# Patient Record
Sex: FEMALE | Race: WHITE | NOT HISPANIC OR LATINO
[De-identification: names, ages, dates, MRNs, and addresses within clinical notes are randomized per-mention and may not be internally consistent; named-entity substitution may affect disease eponyms.]

---

## 2017-06-16 ENCOUNTER — TRANSCRIPTION ENCOUNTER (OUTPATIENT)
Age: 72
End: 2017-06-16

## 2019-02-27 ENCOUNTER — RECORD ABSTRACTING (OUTPATIENT)
Age: 74
End: 2019-02-27

## 2019-02-27 DIAGNOSIS — Z91.89 OTHER SPECIFIED PERSONAL RISK FACTORS, NOT ELSEWHERE CLASSIFIED: ICD-10-CM

## 2019-02-27 DIAGNOSIS — Z80.0 FAMILY HISTORY OF MALIGNANT NEOPLASM OF DIGESTIVE ORGANS: ICD-10-CM

## 2019-02-27 DIAGNOSIS — Z78.9 OTHER SPECIFIED HEALTH STATUS: ICD-10-CM

## 2019-02-27 DIAGNOSIS — Z82.49 FAMILY HISTORY OF ISCHEMIC HEART DISEASE AND OTHER DISEASES OF THE CIRCULATORY SYSTEM: ICD-10-CM

## 2019-02-27 DIAGNOSIS — Z87.09 PERSONAL HISTORY OF OTHER DISEASES OF THE RESPIRATORY SYSTEM: ICD-10-CM

## 2019-02-27 DIAGNOSIS — Z87.891 PERSONAL HISTORY OF NICOTINE DEPENDENCE: ICD-10-CM

## 2019-02-27 DIAGNOSIS — Z87.19 PERSONAL HISTORY OF OTHER DISEASES OF THE DIGESTIVE SYSTEM: ICD-10-CM

## 2019-02-27 DIAGNOSIS — Z80.52 FAMILY HISTORY OF MALIGNANT NEOPLASM OF BLADDER: ICD-10-CM

## 2019-02-27 RX ORDER — UBIDECARENONE/VIT E ACET 100MG-5
CAPSULE ORAL
Refills: 0 | Status: ACTIVE | COMMUNITY

## 2019-02-28 ENCOUNTER — RX RENEWAL (OUTPATIENT)
Age: 74
End: 2019-02-28

## 2019-03-03 ENCOUNTER — TRANSCRIPTION ENCOUNTER (OUTPATIENT)
Age: 74
End: 2019-03-03

## 2019-03-08 ENCOUNTER — APPOINTMENT (OUTPATIENT)
Dept: INTERNAL MEDICINE | Facility: CLINIC | Age: 74
End: 2019-03-08
Payer: MEDICARE

## 2019-03-08 VITALS
HEART RATE: 76 BPM | WEIGHT: 223.13 LBS | RESPIRATION RATE: 18 BRPM | SYSTOLIC BLOOD PRESSURE: 164 MMHG | HEIGHT: 64.5 IN | BODY MASS INDEX: 37.63 KG/M2 | TEMPERATURE: 98.6 F | OXYGEN SATURATION: 97 % | DIASTOLIC BLOOD PRESSURE: 92 MMHG

## 2019-03-08 DIAGNOSIS — R06.2 WHEEZING: ICD-10-CM

## 2019-03-08 PROCEDURE — 99214 OFFICE O/P EST MOD 30 MIN: CPT

## 2019-03-08 NOTE — ADDENDUM
[FreeTextEntry1] : I, Kinza Chevy, acted solely as scribe for Dr. Nacho Eduardo DO on this date Mar  8 2019 10:20AM .\par \par All medical record entries made by the Scribe were at my, Dr. Nacho Eduardo DO direction and personally dictated by me on Mar  8 2019 10:20AM . I have reviewed the chart and agree that the record accurately reflects my personal performance of the history, physical exam, assessment and plan. I have also personally directed, reviewed and agreed with the chart.

## 2019-03-08 NOTE — PHYSICAL EXAM

## 2019-03-08 NOTE — HISTORY OF PRESENT ILLNESS
[FreeTextEntry1] : fasting blood work  [de-identified] : Patient is a 73 year old  woman with a past medical history as below who presents for fasting blood work. 1.5 weeks ago she had an influx of mucus that went into her lungs and exacerbated her asthma. Patient went to urgent care on Sunday for shortness of breath, wheezing and cough. Patient was started on Prednisone 20mg BID for 5 days. She states she did not take a dose this morning. She continues to cough and wheeze and feels fatigued.

## 2019-03-08 NOTE — ASSESSMENT
[FreeTextEntry1] : Patient is a 73 year old  woman with a past medical history as above who presents for asthma exacerbation.

## 2019-03-08 NOTE — REVIEW OF SYSTEMS
[Negative] : Heme/Lymph [Postnasal Drip] : postnasal drip [Shortness Of Breath] : shortness of breath [Cough] : cough [Wheezing] : wheezing

## 2019-03-08 NOTE — PLAN
[FreeTextEntry1] : ENT\par post-nasal drip - start Clartin 10mg one/day for 7+ days depending on symptoms - possibly exacerbating asthma - start Levaquin 250mg q.d. for 10 days, Rx filled - advised to eat probiotics - discussed Nolan advised patient to stop Mucinex - advised patient to finish last Prednisone\par Cardiology\par elevated blood pressure - likely secondary to cough - continue HCTZ 25mg q.d. and Metoprolol 100mg q.d.\par Pulmonary\par asthma exacerbation - ProAir  Rx refilled - prescribed chest X-RAY\par Psychiatry\par anxiety - advised to minimized Xanax use during acute pulmonary issue - Xanax 0.25mg Rx filled, #30  reviewed\par Infectious disease\par will have Shingrex titer next month\par Follow up 1 month fasting blood work to next month due to illness and Prednisone\par

## 2019-03-13 ENCOUNTER — RX RENEWAL (OUTPATIENT)
Age: 74
End: 2019-03-13

## 2019-03-25 ENCOUNTER — RX RENEWAL (OUTPATIENT)
Age: 74
End: 2019-03-25

## 2019-04-04 ENCOUNTER — APPOINTMENT (OUTPATIENT)
Dept: INTERNAL MEDICINE | Facility: CLINIC | Age: 74
End: 2019-04-04
Payer: MEDICARE

## 2019-04-04 VITALS
TEMPERATURE: 98.2 F | HEIGHT: 65 IN | BODY MASS INDEX: 37.15 KG/M2 | WEIGHT: 223 LBS | DIASTOLIC BLOOD PRESSURE: 84 MMHG | HEART RATE: 77 BPM | RESPIRATION RATE: 16 BRPM | SYSTOLIC BLOOD PRESSURE: 140 MMHG | OXYGEN SATURATION: 98 %

## 2019-04-04 DIAGNOSIS — M71.20 SYNOVIAL CYST OF POPLITEAL SPACE [BAKER], UNSPECIFIED KNEE: ICD-10-CM

## 2019-04-04 DIAGNOSIS — T78.40XS ALLERGY, UNSPECIFIED, SEQUELA: ICD-10-CM

## 2019-04-04 PROCEDURE — 99214 OFFICE O/P EST MOD 30 MIN: CPT | Mod: 25

## 2019-04-04 PROCEDURE — 81003 URINALYSIS AUTO W/O SCOPE: CPT | Mod: QW

## 2019-04-04 PROCEDURE — 36415 COLL VENOUS BLD VENIPUNCTURE: CPT

## 2019-04-04 NOTE — ADDENDUM
[FreeTextEntry1] : I, Kinza Chevy, acted solely as scribe for Dr. Nacho Eduardo DO on this date Apr 4 2019  9:10AM .\par \par All medical record entries made by the Scribe were at my, Dr. Nacho Eduardo DO direction and personally dictated by me on Apr 4 2019  9:10AM . I have reviewed the chart and agree that the record accurately reflects my personal performance of the history, physical exam, assessment and plan. I have also personally directed, reviewed and agreed with the chart.

## 2019-04-04 NOTE — PHYSICAL EXAM

## 2019-04-04 NOTE — ASSESSMENT
[FreeTextEntry1] : Patient is a 73 year old  woman with a past medical history as above who presents for fasting blood work and follow up of chronic medical issues.

## 2019-04-04 NOTE — PLAN
[FreeTextEntry1] : Cardiology\par hypertension - continue HCTZ 25mg p.o.q.d., Avapro 300mg p.o.q.d. and Metoprolol 100mg p.o.q.d. - continue low sodium diet\par Endocrinology\par hyperlipidemia - continue Atorvastatin 40mg .p.o.q.d. and Fenofibrate 160mg .p.o.q.d. - continue low fat/low cholesterol diet - check FLP and LFT\par diabetes - check hemaglobin A1C and urine microablumin/creatinine ratio - continue low carbohydrate diet \par Pulmonary\par asthma- continue Symbicort 160-4.5mcg/act 2 puffs BID, Rx filled for 3 tubes x2 and ProAir 108 mcg/act 2 puffs q.6.h. x3\par Gastroenterology\par GERD - continue antireflux measures\par Psychiatry\par anxiety-Xanax 0.25mg .p.o.q.d.\par Infectious disease\par check IGG and IGN\par will plan to have Shingrex titer\par \par check female panel and UA

## 2019-04-04 NOTE — HISTORY OF PRESENT ILLNESS
[FreeTextEntry1] : fasting blood work, general follow-up  [de-identified] : Patient is a 73 year old  woman with a past medical history as below who presents for fasting blood work. Patient states her upper respiratory infection has resolved. Patient had leg doppler for knee pain that was negative for clots but showed a Baker's cyst. Patient states she is exercising but eats unhealthy meals infrequently for social gatherings. She is taking all medications as prescribed and denies any adverse reactions or side effects. Patient requests Symbicort 160-4.5mcg/act to be refilled 3 at a time. Patient is still interested in having Shingrex titer as discussed at last visit. Patient 3/30/18 colonoscopy with Dr. Hooks that was negative.

## 2019-04-05 ENCOUNTER — RX RENEWAL (OUTPATIENT)
Age: 74
End: 2019-04-05

## 2019-04-18 LAB
25(OH)D3 SERPL-MCNC: 36.8 NG/ML
ALBUMIN SERPL ELPH-MCNC: 4.6 G/DL
ALP BLD-CCNC: 49 U/L
ALT SERPL-CCNC: 21 U/L
ANION GAP SERPL CALC-SCNC: 12 MMOL/L
AST SERPL-CCNC: 18 U/L
BASOPHILS # BLD AUTO: 0.05 K/UL
BASOPHILS NFR BLD AUTO: 0.5 %
BILIRUB SERPL-MCNC: 0.6 MG/DL
BUN SERPL-MCNC: 27 MG/DL
CALCIUM SERPL-MCNC: 10.4 MG/DL
CHLORIDE SERPL-SCNC: 98 MMOL/L
CHOLEST SERPL-MCNC: 156 MG/DL
CHOLEST/HDLC SERPL: 3.2 RATIO
CO2 SERPL-SCNC: 28 MMOL/L
CREAT SERPL-MCNC: 1.35 MG/DL
EOSINOPHIL # BLD AUTO: 0.31 K/UL
EOSINOPHIL NFR BLD AUTO: 3.3 %
ESTIMATED AVERAGE GLUCOSE: 134 MG/DL
GLUCOSE SERPL-MCNC: 112 MG/DL
HBA1C MFR BLD HPLC: 6.3 %
HCT VFR BLD CALC: 38.9 %
HDLC SERPL-MCNC: 49 MG/DL
HGB BLD-MCNC: 11.9 G/DL
HSV 1+2 IGG SER IA-IMP: NEGATIVE
HSV 1+2 IGG SER IA-IMP: POSITIVE
HSV1 IGG SER QL: 9.45 INDEX
HSV1 IGM SER QL: NORMAL TITER
HSV2 AB FLD-ACNC: NORMAL TITER
HSV2 IGG SER QL: 0.12 INDEX
IMM GRANULOCYTES NFR BLD AUTO: 1 %
LDLC SERPL CALC-MCNC: 86 MG/DL
LYMPHOCYTES # BLD AUTO: 2.51 K/UL
LYMPHOCYTES NFR BLD AUTO: 27.1 %
MAN DIFF?: NORMAL
MCHC RBC-ENTMCNC: 29.5 PG
MCHC RBC-ENTMCNC: 30.6 GM/DL
MCV RBC AUTO: 96.5 FL
MONOCYTES # BLD AUTO: 0.87 K/UL
MONOCYTES NFR BLD AUTO: 9.4 %
NEUTROPHILS # BLD AUTO: 5.44 K/UL
NEUTROPHILS NFR BLD AUTO: 58.7 %
PLATELET # BLD AUTO: 397 K/UL
POTASSIUM SERPL-SCNC: 4.2 MMOL/L
PROT SERPL-MCNC: 7.2 G/DL
RBC # BLD: 4.03 M/UL
RBC # FLD: 14.3 %
SODIUM SERPL-SCNC: 138 MMOL/L
TRIGL SERPL-MCNC: 106 MG/DL
TSH SERPL-ACNC: 1.92 UIU/ML
WBC # FLD AUTO: 9.27 K/UL

## 2019-04-25 ENCOUNTER — RX RENEWAL (OUTPATIENT)
Age: 74
End: 2019-04-25

## 2019-05-03 ENCOUNTER — RX RENEWAL (OUTPATIENT)
Age: 74
End: 2019-05-03

## 2019-05-07 ENCOUNTER — RX RENEWAL (OUTPATIENT)
Age: 74
End: 2019-05-07

## 2019-05-13 ENCOUNTER — TRANSCRIPTION ENCOUNTER (OUTPATIENT)
Age: 74
End: 2019-05-13

## 2019-05-29 ENCOUNTER — RX RENEWAL (OUTPATIENT)
Age: 74
End: 2019-05-29

## 2019-06-21 ENCOUNTER — RX RENEWAL (OUTPATIENT)
Age: 74
End: 2019-06-21

## 2019-08-01 ENCOUNTER — OTHER (OUTPATIENT)
Age: 74
End: 2019-08-01

## 2019-08-02 ENCOUNTER — RX CHANGE (OUTPATIENT)
Age: 74
End: 2019-08-02

## 2019-09-20 ENCOUNTER — RX RENEWAL (OUTPATIENT)
Age: 74
End: 2019-09-20

## 2019-09-20 ENCOUNTER — APPOINTMENT (OUTPATIENT)
Dept: INTERNAL MEDICINE | Facility: CLINIC | Age: 74
End: 2019-09-20
Payer: MEDICARE

## 2019-09-20 VITALS — SYSTOLIC BLOOD PRESSURE: 136 MMHG | DIASTOLIC BLOOD PRESSURE: 80 MMHG

## 2019-09-20 VITALS
TEMPERATURE: 98.3 F | RESPIRATION RATE: 16 BRPM | SYSTOLIC BLOOD PRESSURE: 142 MMHG | HEART RATE: 83 BPM | BODY MASS INDEX: 37.55 KG/M2 | HEIGHT: 65 IN | OXYGEN SATURATION: 98 % | WEIGHT: 225.38 LBS | DIASTOLIC BLOOD PRESSURE: 82 MMHG

## 2019-09-20 PROCEDURE — G0008: CPT

## 2019-09-20 PROCEDURE — 36415 COLL VENOUS BLD VENIPUNCTURE: CPT

## 2019-09-20 PROCEDURE — 90670 PCV13 VACCINE IM: CPT

## 2019-09-20 PROCEDURE — G0009: CPT

## 2019-09-20 PROCEDURE — 90662 IIV NO PRSV INCREASED AG IM: CPT

## 2019-09-20 PROCEDURE — 99214 OFFICE O/P EST MOD 30 MIN: CPT | Mod: 25

## 2019-09-22 NOTE — HISTORY OF PRESENT ILLNESS
[de-identified] : Patient is a 74 year old female with a past medical history as below who presents for fasting blood work, blood pressure check, and general follow-up. Patient states she is taking all medications as prescribed. She denies lightheadedness/dizziness on Valsartan, but notes intermittent palpitations 20 minutes after taking the medication that usually resolves on its own over the course of the day. She denies any new arthralgias or myalgias on Atorvastatin. Patient states her last colonoscopy was earlier this year with gastroenterologist, Dr. Frye and was normal. She states her last stress test and angiogram with cardiologist, Dr. Jerry was within the last 2 years. Patient notes itchy skin. She notes anxiety. Patient inquires about receiving the flu and pneumonia vaccine.  [FreeTextEntry1] : fasting blood work, blood pressure check, and general follow-up

## 2019-09-22 NOTE — REVIEW OF SYSTEMS
[Palpitations] : palpitations [Negative] : Heme/Lymph [Itching] : Itching [Anxiety] : anxiety [de-identified] : itchy skin

## 2019-09-22 NOTE — PHYSICAL EXAM
[No Acute Distress] : no acute distress [Well Nourished] : well nourished [Well Developed] : well developed [Well-Appearing] : well-appearing [Normal Sclera/Conjunctiva] : normal sclera/conjunctiva [PERRL] : pupils equal round and reactive to light [Normal Outer Ear/Nose] : the outer ears and nose were normal in appearance [EOMI] : extraocular movements intact [Normal Oropharynx] : the oropharynx was normal [No JVD] : no jugular venous distention [No Lymphadenopathy] : no lymphadenopathy [Supple] : supple [Thyroid Normal, No Nodules] : the thyroid was normal and there were no nodules present [No Accessory Muscle Use] : no accessory muscle use [No Respiratory Distress] : no respiratory distress  [Clear to Auscultation] : lungs were clear to auscultation bilaterally [Normal Rate] : normal rate  [Regular Rhythm] : with a regular rhythm [No Murmur] : no murmur heard [Normal S1, S2] : normal S1 and S2 [No Carotid Bruits] : no carotid bruits [No Abdominal Bruit] : a ~M bruit was not heard ~T in the abdomen [No Varicosities] : no varicosities [Pedal Pulses Present] : the pedal pulses are present [No Edema] : there was no peripheral edema [No Palpable Aorta] : no palpable aorta [Soft] : abdomen soft [No Extremity Clubbing/Cyanosis] : no extremity clubbing/cyanosis [Non Tender] : non-tender [Non-distended] : non-distended [No HSM] : no HSM [No Masses] : no abdominal mass palpated [Normal Posterior Cervical Nodes] : no posterior cervical lymphadenopathy [Normal Bowel Sounds] : normal bowel sounds [Normal Anterior Cervical Nodes] : no anterior cervical lymphadenopathy [No CVA Tenderness] : no CVA  tenderness [No Joint Swelling] : no joint swelling [No Spinal Tenderness] : no spinal tenderness [No Rash] : no rash [Grossly Normal Strength/Tone] : grossly normal strength/tone [Coordination Grossly Intact] : coordination grossly intact [No Focal Deficits] : no focal deficits [Normal Affect] : the affect was normal [Normal Gait] : normal gait [Deep Tendon Reflexes (DTR)] : deep tendon reflexes were 2+ and symmetric [Normal Insight/Judgement] : insight and judgment were intact [de-identified] : obese

## 2019-09-22 NOTE — ADDENDUM
[FreeTextEntry1] : I, Joaquin Jenkins, acted solely as scribe for Dr. Nacho Eduardo DO on this date 09/20/2019  9:40AM .\par \par All medical record entries made by the Scribe were at my, Dr. Nacho Eduardo DO direction and personally dictated by me on 09/20/2019  9:40AM. I have reviewed the chart and agree that the record accurately reflects my personal performance of the history, physical exam, assessment and plan. I have also personally directed, reviewed and agreed with the chart.\par

## 2019-09-22 NOTE — ASSESSMENT
[FreeTextEntry1] : Patient is a 74 year old female with a past medical history as above who presents for fasting blood work, blood pressure check, and general follow-up.

## 2019-09-22 NOTE — PLAN
[FreeTextEntry1] : Pulmonary\par asthma - continue Symbicort 160-4.5 MCG/ACT as directed, ProAir  MCG/ACT as directed, and Singulair 10mg p.o.q.d. as directed \par Cardiology\par intermittent heart palpitations - advised follow up with cardiologist, Dr. Jerry for possible Holter monitor and echocardiogram , appears to be secondary to anxiety of taking valsartan in place of irbesartan which has been unavailable\par hypertension - continue Valsartan 320mg p.o.q.d., Metoprolol Tartrate 100mg TID p.o.q.d., and HCTZ 25mg p.o.q.d. - continue low sodium diet\par continue Co Q10 100mg p.o.q.d.\par Endocrinology\par diabetes - continue low carbohydrate/low sugar diet - check hemoglobin A1C and urine microalbumin/creatinine ratio\par hyperlipidemia/hypertriglyceridemia - continue Atorvastatin Calcium 10mg p.o.q.d. and Fenofibrate 160mg p.o.q.d. - continue low cholesterol/low fat diet - check FLP and LFTs\par continue vitamin D-3 2000 unit capsules TID p.o.q.d. with food as directed - check vitamin D\par Gastroenterology\par Will request results of last screening colonoscopy from gastroenterologist, Dr. Frye\par GERD - continue antireflux measures\par Psychiatry\par possible anxiety - discussed referral to a therapist after cardiac work-up with Dr. Jerry\par Infectious Disease\par Prevnar 13 - 0.5mL x 1 administered intramuscularly \par flu vaccine - Fluzone High-Dose 0.5mL x 1 administered intramuscularly \par \par check female panel, UA \par

## 2019-10-02 LAB
25(OH)D3 SERPL-MCNC: 38.3 NG/ML
ALBUMIN SERPL ELPH-MCNC: 4.3 G/DL
ALP BLD-CCNC: 52 U/L
ALT SERPL-CCNC: 24 U/L
ANION GAP SERPL CALC-SCNC: 10 MMOL/L
AST SERPL-CCNC: 22 U/L
BASOPHILS # BLD AUTO: 0.06 K/UL
BASOPHILS NFR BLD AUTO: 0.7 %
BILIRUB SERPL-MCNC: 0.4 MG/DL
BUN SERPL-MCNC: 26 MG/DL
CALCIUM SERPL-MCNC: 9.6 MG/DL
CHLORIDE SERPL-SCNC: 101 MMOL/L
CHOLEST SERPL-MCNC: 126 MG/DL
CHOLEST/HDLC SERPL: 2.8 RATIO
CO2 SERPL-SCNC: 28 MMOL/L
CREAT SERPL-MCNC: 1.29 MG/DL
CREAT SPEC-SCNC: 67 MG/DL
EOSINOPHIL # BLD AUTO: 0.27 K/UL
EOSINOPHIL NFR BLD AUTO: 3.1 %
ESTIMATED AVERAGE GLUCOSE: 137 MG/DL
GLUCOSE SERPL-MCNC: 111 MG/DL
HBA1C MFR BLD HPLC: 6.4 %
HCT VFR BLD CALC: 38.9 %
HDLC SERPL-MCNC: 45 MG/DL
HGB BLD-MCNC: 11.7 G/DL
IMM GRANULOCYTES NFR BLD AUTO: 0.5 %
LDLC SERPL CALC-MCNC: 60 MG/DL
LYMPHOCYTES # BLD AUTO: 2.13 K/UL
LYMPHOCYTES NFR BLD AUTO: 24.1 %
MAN DIFF?: NORMAL
MCHC RBC-ENTMCNC: 28.3 PG
MCHC RBC-ENTMCNC: 30.1 GM/DL
MCV RBC AUTO: 94.2 FL
MICROALBUMIN 24H UR DL<=1MG/L-MCNC: <1.2 MG/DL
MICROALBUMIN/CREAT 24H UR-RTO: NORMAL MG/G
MONOCYTES # BLD AUTO: 0.82 K/UL
MONOCYTES NFR BLD AUTO: 9.3 %
NEUTROPHILS # BLD AUTO: 5.51 K/UL
NEUTROPHILS NFR BLD AUTO: 62.3 %
PLATELET # BLD AUTO: 335 K/UL
POTASSIUM SERPL-SCNC: 4.1 MMOL/L
PROT SERPL-MCNC: 7.5 G/DL
RBC # BLD: 4.13 M/UL
RBC # FLD: 14 %
SODIUM SERPL-SCNC: 139 MMOL/L
TRIGL SERPL-MCNC: 104 MG/DL
TSH SERPL-ACNC: 1.77 UIU/ML
WBC # FLD AUTO: 8.83 K/UL

## 2019-10-16 ENCOUNTER — RX RENEWAL (OUTPATIENT)
Age: 74
End: 2019-10-16

## 2019-11-24 ENCOUNTER — RX RENEWAL (OUTPATIENT)
Age: 74
End: 2019-11-24

## 2019-12-19 ENCOUNTER — RX RENEWAL (OUTPATIENT)
Age: 74
End: 2019-12-19

## 2019-12-30 ENCOUNTER — RX RENEWAL (OUTPATIENT)
Age: 74
End: 2019-12-30

## 2020-01-02 ENCOUNTER — RX RENEWAL (OUTPATIENT)
Age: 75
End: 2020-01-02

## 2020-01-05 ENCOUNTER — RX RENEWAL (OUTPATIENT)
Age: 75
End: 2020-01-05

## 2020-01-26 ENCOUNTER — RX RENEWAL (OUTPATIENT)
Age: 75
End: 2020-01-26

## 2020-01-31 ENCOUNTER — RX RENEWAL (OUTPATIENT)
Age: 75
End: 2020-01-31

## 2020-02-04 DIAGNOSIS — Z12.39 ENCOUNTER FOR OTHER SCREENING FOR MALIGNANT NEOPLASM OF BREAST: ICD-10-CM

## 2020-02-19 ENCOUNTER — APPOINTMENT (OUTPATIENT)
Dept: INTERNAL MEDICINE | Facility: CLINIC | Age: 75
End: 2020-02-19
Payer: MEDICARE

## 2020-02-19 VITALS
OXYGEN SATURATION: 98 % | SYSTOLIC BLOOD PRESSURE: 148 MMHG | RESPIRATION RATE: 16 BRPM | HEIGHT: 65 IN | TEMPERATURE: 98.1 F | WEIGHT: 224.25 LBS | BODY MASS INDEX: 37.36 KG/M2 | DIASTOLIC BLOOD PRESSURE: 90 MMHG | HEART RATE: 82 BPM

## 2020-02-19 DIAGNOSIS — M54.12 RADICULOPATHY, CERVICAL REGION: ICD-10-CM

## 2020-02-19 DIAGNOSIS — K21.9 GASTRO-ESOPHAGEAL REFLUX DISEASE W/OUT ESOPHAGITIS: ICD-10-CM

## 2020-02-19 PROCEDURE — 90715 TDAP VACCINE 7 YRS/> IM: CPT | Mod: GY

## 2020-02-19 PROCEDURE — 90471 IMMUNIZATION ADMIN: CPT

## 2020-02-19 PROCEDURE — G0442 ANNUAL ALCOHOL SCREEN 15 MIN: CPT | Mod: 59

## 2020-02-19 PROCEDURE — 36415 COLL VENOUS BLD VENIPUNCTURE: CPT

## 2020-02-19 PROCEDURE — 99214 OFFICE O/P EST MOD 30 MIN: CPT | Mod: 25

## 2020-02-19 NOTE — ADDENDUM
[FreeTextEntry1] : I, Joaquin Jenkins, acted solely as scribe for Dr. Nacho Eduardo DO on this date 02/19/2020  8:30AM .\par \par All medical record entries made by the Scribe were at my, Dr. Nacho Eduardo DO direction and personally dictated by me on 02/19/2020  8:30AM. I have reviewed the chart and agree that the record accurately reflects my personal performance of the history, physical exam, assessment and plan. I have also personally directed, reviewed and agreed with the chart.\par

## 2020-02-19 NOTE — HEALTH RISK ASSESSMENT
[0] : 2) Feeling down, depressed, or hopeless: Not at all (0) [No] : In the past 12 months have you used drugs other than those required for medical reasons? No [MammogramDate] : 02/20 [MammogramComments] : No mammographic evidence of malignancy.  [] : No [Audit-CScore] : 0 [[id number]] : 0

## 2020-02-19 NOTE — PLAN
[FreeTextEntry1] : Pulmonary\par asthma - continue Symbicort 160-4.5 MCG/ACT as directed, ProAir  MCG/ACT as directed, and Singulair (Montelukast Sodium) 10mg p.o.q.d. as directed \par Cardiology\par benign essential hypertension - continue Valsartan 320mg p.o.q.d., Metoprolol Tartrate 100mg TID p.o.q.d., and HCTZ 25mg p.o.q.d. - continue low sodium diet\par Endocrinology\par history of diabetes - continue low carbohydrate/low sugar diet - check hemoglobin A1C \par hyperlipidemia - continue Atorvastatin Calcium 10mg p.o.q.d. - continue low cholesterol/low fat diet - check FLP and LFTs\par hypertriglyceridemia - continue Fenofibrate 160mg p.o.q.d. - continue low cholesterol/low fat diet and low carbohydrate/low sugar diet - check FLP and LFTs\par continue vitamin D-3 2000 unit capsules TID p.o.q.d. with food as directed - check vitamin D\par Musculoskeletal\par neck pain/LUE tingling - possibly secondary to nerve impingement - discussed MRI if pain persists/worsens; also discussed following up with a pain management physician for a cortisone injection pending results of MRI\par Gastroenterology\par GERD/esophageal reflux - continue antireflux measures\par Infectious Disease\par Tdap (Adacel) Vaccine - 0.5mL x 1 administered intramuscularly to left deltoid \par Shingrix - discussed, patient to determine if vaccine is covered under medical benefits of current insurance plan and follow up for 1st dose; otherwise, instructed to follow up at the pharmacy for vaccine administration\par \par check female panel

## 2020-02-19 NOTE — HISTORY OF PRESENT ILLNESS
[FreeTextEntry1] : fasting blood work and general follow-up\par  [de-identified] : Patient is a 74 year old female with a past medical history as below who presents for fasting blood work and general follow-up. Patient states she is taking all medications as prescribed and denies any adverse reactions or side effects. She denies lightheadedness or dizziness on Valsartan and Metoprolol Tartrate. Patient notes neck pain radiating down the left arm that started about 3 weeks. She notes left shoulder pain and a tingling sensation in the LUE. She also notes associated LUE itching. Patient has not received the Tetanus vaccine in the past 10 years. She inquires about the Shingles vaccine (Shingrix).

## 2020-02-19 NOTE — REVIEW OF SYSTEMS
[Negative] : Psychiatric [Joint Pain] : joint pain [Itching] : Itching [de-identified] : tingling sensation in LUE [FreeTextEntry9] : neck pain; left shoulder pain [de-identified] : LUE itching

## 2020-02-19 NOTE — PHYSICAL EXAM
[No Acute Distress] : no acute distress [Well Nourished] : well nourished [Well Developed] : well developed [Well-Appearing] : well-appearing [Normal Sclera/Conjunctiva] : normal sclera/conjunctiva [PERRL] : pupils equal round and reactive to light [Normal Outer Ear/Nose] : the outer ears and nose were normal in appearance [EOMI] : extraocular movements intact [No JVD] : no jugular venous distention [Normal Oropharynx] : the oropharynx was normal [No Lymphadenopathy] : no lymphadenopathy [Thyroid Normal, No Nodules] : the thyroid was normal and there were no nodules present [Supple] : supple [No Respiratory Distress] : no respiratory distress  [No Accessory Muscle Use] : no accessory muscle use [Clear to Auscultation] : lungs were clear to auscultation bilaterally [Normal Rate] : normal rate  [Regular Rhythm] : with a regular rhythm [Normal S1, S2] : normal S1 and S2 [No Murmur] : no murmur heard [No Carotid Bruits] : no carotid bruits [No Varicosities] : no varicosities [No Abdominal Bruit] : a ~M bruit was not heard ~T in the abdomen [Pedal Pulses Present] : the pedal pulses are present [No Edema] : there was no peripheral edema [No Extremity Clubbing/Cyanosis] : no extremity clubbing/cyanosis [No Palpable Aorta] : no palpable aorta [Soft] : abdomen soft [Non Tender] : non-tender [No Masses] : no abdominal mass palpated [Non-distended] : non-distended [Normal Bowel Sounds] : normal bowel sounds [No HSM] : no HSM [No CVA Tenderness] : no CVA  tenderness [Normal Anterior Cervical Nodes] : no anterior cervical lymphadenopathy [Normal Posterior Cervical Nodes] : no posterior cervical lymphadenopathy [No Spinal Tenderness] : no spinal tenderness [No Joint Swelling] : no joint swelling [No Rash] : no rash [Grossly Normal Strength/Tone] : grossly normal strength/tone [No Focal Deficits] : no focal deficits [Coordination Grossly Intact] : coordination grossly intact [Normal Gait] : normal gait [Deep Tendon Reflexes (DTR)] : deep tendon reflexes were 2+ and symmetric [Normal Insight/Judgement] : insight and judgment were intact [Normal Affect] : the affect was normal [de-identified] : obese

## 2020-02-19 NOTE — ASSESSMENT
[FreeTextEntry1] : Patient is a 74 year old female with a past medical history as above who presents for fasting blood work and general follow-up.\par

## 2020-02-23 LAB
25(OH)D3 SERPL-MCNC: 41.3 NG/ML
ALBUMIN SERPL ELPH-MCNC: 4.3 G/DL
ALP BLD-CCNC: 49 U/L
ALT SERPL-CCNC: 23 U/L
ANION GAP SERPL CALC-SCNC: 12 MMOL/L
AST SERPL-CCNC: 23 U/L
BASOPHILS # BLD AUTO: 0.06 K/UL
BASOPHILS NFR BLD AUTO: 0.6 %
BILIRUB SERPL-MCNC: 0.6 MG/DL
BUN SERPL-MCNC: 29 MG/DL
CALCIUM SERPL-MCNC: 10 MG/DL
CHLORIDE SERPL-SCNC: 99 MMOL/L
CHOLEST SERPL-MCNC: 147 MG/DL
CHOLEST/HDLC SERPL: 2.9 RATIO
CO2 SERPL-SCNC: 29 MMOL/L
CREAT SERPL-MCNC: 1.28 MG/DL
EOSINOPHIL # BLD AUTO: 0.26 K/UL
EOSINOPHIL NFR BLD AUTO: 2.7 %
ESTIMATED AVERAGE GLUCOSE: 134 MG/DL
GLUCOSE SERPL-MCNC: 115 MG/DL
HBA1C MFR BLD HPLC: 6.3 %
HCT VFR BLD CALC: 41.6 %
HDLC SERPL-MCNC: 50 MG/DL
HGB BLD-MCNC: 12.5 G/DL
IMM GRANULOCYTES NFR BLD AUTO: 0.4 %
LDLC SERPL CALC-MCNC: 75 MG/DL
LYMPHOCYTES # BLD AUTO: 2.71 K/UL
LYMPHOCYTES NFR BLD AUTO: 28.1 %
MAN DIFF?: NORMAL
MCHC RBC-ENTMCNC: 29.1 PG
MCHC RBC-ENTMCNC: 30 GM/DL
MCV RBC AUTO: 97 FL
MONOCYTES # BLD AUTO: 0.79 K/UL
MONOCYTES NFR BLD AUTO: 8.2 %
NEUTROPHILS # BLD AUTO: 5.79 K/UL
NEUTROPHILS NFR BLD AUTO: 60 %
PLATELET # BLD AUTO: 338 K/UL
POTASSIUM SERPL-SCNC: 4.4 MMOL/L
PROT SERPL-MCNC: 7.4 G/DL
RBC # BLD: 4.29 M/UL
RBC # FLD: 13.9 %
SODIUM SERPL-SCNC: 140 MMOL/L
TRIGL SERPL-MCNC: 111 MG/DL
TSH SERPL-ACNC: 2.31 UIU/ML
WBC # FLD AUTO: 9.65 K/UL

## 2020-03-20 ENCOUNTER — RX RENEWAL (OUTPATIENT)
Age: 75
End: 2020-03-20

## 2020-04-01 ENCOUNTER — RX RENEWAL (OUTPATIENT)
Age: 75
End: 2020-04-01

## 2020-04-26 ENCOUNTER — RX RENEWAL (OUTPATIENT)
Age: 75
End: 2020-04-26

## 2020-04-26 RX ORDER — ALBUTEROL SULFATE 90 UG/1
108 (90 BASE) INHALANT RESPIRATORY (INHALATION)
Qty: 1 | Refills: 0 | Status: ACTIVE | COMMUNITY
Start: 2020-04-26 | End: 1900-01-01

## 2020-04-28 ENCOUNTER — RX CHANGE (OUTPATIENT)
Age: 75
End: 2020-04-28

## 2020-04-29 ENCOUNTER — RX CHANGE (OUTPATIENT)
Age: 75
End: 2020-04-29

## 2020-05-07 ENCOUNTER — RX RENEWAL (OUTPATIENT)
Age: 75
End: 2020-05-07

## 2020-05-31 ENCOUNTER — RX RENEWAL (OUTPATIENT)
Age: 75
End: 2020-05-31

## 2020-06-05 ENCOUNTER — RX RENEWAL (OUTPATIENT)
Age: 75
End: 2020-06-05

## 2020-06-08 ENCOUNTER — RX CHANGE (OUTPATIENT)
Age: 75
End: 2020-06-08

## 2020-06-09 ENCOUNTER — RX CHANGE (OUTPATIENT)
Age: 75
End: 2020-06-09

## 2020-06-12 ENCOUNTER — APPOINTMENT (OUTPATIENT)
Dept: INTERNAL MEDICINE | Facility: CLINIC | Age: 75
End: 2020-06-12
Payer: MEDICARE

## 2020-06-12 VITALS
HEIGHT: 60 IN | SYSTOLIC BLOOD PRESSURE: 150 MMHG | TEMPERATURE: 98.3 F | HEART RATE: 86 BPM | OXYGEN SATURATION: 96 % | DIASTOLIC BLOOD PRESSURE: 80 MMHG | RESPIRATION RATE: 16 BRPM

## 2020-06-12 VITALS — SYSTOLIC BLOOD PRESSURE: 140 MMHG | DIASTOLIC BLOOD PRESSURE: 80 MMHG

## 2020-06-12 DIAGNOSIS — Z11.59 ENCOUNTER FOR SCREENING FOR OTHER VIRAL DISEASES: ICD-10-CM

## 2020-06-12 PROCEDURE — 36415 COLL VENOUS BLD VENIPUNCTURE: CPT

## 2020-06-12 PROCEDURE — 99214 OFFICE O/P EST MOD 30 MIN: CPT | Mod: 25

## 2020-06-14 NOTE — PHYSICAL EXAM
[No Acute Distress] : no acute distress [Well Nourished] : well nourished [Well Developed] : well developed [Well-Appearing] : well-appearing [EOMI] : extraocular movements intact [PERRL] : pupils equal round and reactive to light [Normal Sclera/Conjunctiva] : normal sclera/conjunctiva [Normal Outer Ear/Nose] : the outer ears and nose were normal in appearance [No JVD] : no jugular venous distention [Normal Oropharynx] : the oropharynx was normal [Thyroid Normal, No Nodules] : the thyroid was normal and there were no nodules present [No Lymphadenopathy] : no lymphadenopathy [Supple] : supple [No Accessory Muscle Use] : no accessory muscle use [No Respiratory Distress] : no respiratory distress  [Clear to Auscultation] : lungs were clear to auscultation bilaterally [Regular Rhythm] : with a regular rhythm [Normal Rate] : normal rate  [Normal S1, S2] : normal S1 and S2 [No Carotid Bruits] : no carotid bruits [No Murmur] : no murmur heard [No Varicosities] : no varicosities [Pedal Pulses Present] : the pedal pulses are present [No Abdominal Bruit] : a ~M bruit was not heard ~T in the abdomen [No Extremity Clubbing/Cyanosis] : no extremity clubbing/cyanosis [No Palpable Aorta] : no palpable aorta [Non Tender] : non-tender [Soft] : abdomen soft [No Masses] : no abdominal mass palpated [Non-distended] : non-distended [No HSM] : no HSM [Normal Bowel Sounds] : normal bowel sounds [Normal Posterior Cervical Nodes] : no posterior cervical lymphadenopathy [Normal Anterior Cervical Nodes] : no anterior cervical lymphadenopathy [No CVA Tenderness] : no CVA  tenderness [No Joint Swelling] : no joint swelling [No Spinal Tenderness] : no spinal tenderness [Grossly Normal Strength/Tone] : grossly normal strength/tone [No Focal Deficits] : no focal deficits [Coordination Grossly Intact] : coordination grossly intact [No Rash] : no rash [Normal Gait] : normal gait [Deep Tendon Reflexes (DTR)] : deep tendon reflexes were 2+ and symmetric [Normal Affect] : the affect was normal [Normal Insight/Judgement] : insight and judgment were intact [de-identified] : \par trace bilateral lower extremity pitting edema, left worse than right  [de-identified] : obese

## 2020-06-14 NOTE — ADDENDUM
[FreeTextEntry1] : I, Joaquin Jenkins, acted solely as scribe for Dr. Nacho Eduardo DO on this date 06/12/2020 11:40AM .\par \par All medical record entries made by the Scribe were at my, Dr. Nacho Eduardo DO direction and personally dictated by me on 06/12/2020 11:40AM. I have reviewed the chart and agree that the record accurately reflects my personal performance of the history, physical exam, assessment and plan. I have also personally directed, reviewed and agreed with the chart.\par

## 2020-06-14 NOTE — HEALTH RISK ASSESSMENT
[No] : In the past 12 months have you used drugs other than those required for medical reasons? No [0] : 2) Feeling down, depressed, or hopeless: Not at all (0) [Audit-CScore] : 0 [] : No [[id number]] : 0 [MammogramComments] : No mammographic evidence of malignancy.  [MammogramDate] : 02/20 [BoneDensityDate] : 10/12 [BoneDensityComments] : Revealed osteopenia/low bone mineral density.

## 2020-06-14 NOTE — HISTORY OF PRESENT ILLNESS
[de-identified] : Patient is a 75 year old female with a past medical history as below who presents for fasting blood work, Rx refill, and general follow-up. Patient states she is taking all medications as prescribed and denies any adverse reactions or side effects. She denies lightheadedness or dizziness on Valsartan, Metoprolol Tartrate, and HCTZ. She denies any new arthralgias or myalgias on Atorvastatin Calcium and Fenofibrate. Asthma is well-managed on medication. Patient notes bilateral lower extremity edema. Patient inquires about COVID-19 antibody testing with blood work today. Patient requests Rx refill for Symbicort, but states she is unsure if the medication is actually helping. She also requests Rx refill for Alprazolam which she takes infrequently for anxiety.  [FreeTextEntry1] : fasting blood work, Rx refill, and general follow-up\par

## 2020-06-14 NOTE — PLAN
[FreeTextEntry1] : Pulmonary\par asthma - recommended trial off Symbicort 160-4.5 MCG/ACT (Rx filled) for 2-3 weeks and then advised to restart the medication for 2 weeks to determine if the medication is helping to control asthma symptoms; continue ProAir HFA (Albuterol Sulfate) 108 MCG/ACT as directed, Albuterol Sulfate (2.5mg/3mL) nebulization solution p.r.n., Levalbuterol Tartrate 45 MCG/ACT p.r.n. as directed, and Singulair (Montelukast Sodium) 10mg p.o.q.d. as directed \par Cardiovascular\par benign essential hypertension - continue Valsartan 320mg p.o.q.d., Metoprolol Tartrate 100mg TID p.o.q.d., and HCTZ 25mg p.o.q.d. - continue low sodium diet and weight loss \par bilateral lower extremity edema - continue HCTZ 25mg p.o.q.d. - continue keeping legs elevated - continue low sodium diet and weight loss \par Endocrinology\par history of diabetes - continue low carbohydrate/low sugar diet - check hemoglobin A1C \par hyperlipidemia - continue Atorvastatin Calcium 10mg p.o.q.d. - continue low cholesterol/low fat diet - check FLP and LFTs\par hypertriglyceridemia - continue Fenofibrate 160mg p.o.q.d. - continue low cholesterol/low fat diet and low carbohydrate/low sugar diet - check FLP and LFTs\par continue vitamin D-3 2000 unit capsules TID p.o.q.d. with food as directed - check vitamin D\par Gastroenterology\par GERD/esophageal reflux - continue antireflux measures\par Psychiatry\par anxiety - continue Alprazolam 0.25mg p.o. p.r.n. as directed, Rx filled,  reviewed and scanned into chart \par Infectious Disease\par check COVID-19 IgG antibody\par \par check female panel and COVID-19 IgG antibody

## 2020-06-19 LAB
25(OH)D3 SERPL-MCNC: 45.1 NG/ML
ALBUMIN SERPL ELPH-MCNC: 4.5 G/DL
ALP BLD-CCNC: 44 U/L
ALT SERPL-CCNC: 27 U/L
ANION GAP SERPL CALC-SCNC: 14 MMOL/L
AST SERPL-CCNC: 25 U/L
BASOPHILS # BLD AUTO: 0.04 K/UL
BASOPHILS NFR BLD AUTO: 0.4 %
BILIRUB SERPL-MCNC: 0.6 MG/DL
BUN SERPL-MCNC: 24 MG/DL
CALCIUM SERPL-MCNC: 9.9 MG/DL
CHLORIDE SERPL-SCNC: 100 MMOL/L
CHOLEST SERPL-MCNC: 154 MG/DL
CHOLEST/HDLC SERPL: 3.1 RATIO
CO2 SERPL-SCNC: 27 MMOL/L
CREAT SERPL-MCNC: 1.25 MG/DL
EOSINOPHIL # BLD AUTO: 0.25 K/UL
EOSINOPHIL NFR BLD AUTO: 2.7 %
ESTIMATED AVERAGE GLUCOSE: 134 MG/DL
GLUCOSE SERPL-MCNC: 98 MG/DL
HBA1C MFR BLD HPLC: 6.3 %
HCT VFR BLD CALC: 40.2 %
HDLC SERPL-MCNC: 49 MG/DL
HGB BLD-MCNC: 12.3 G/DL
IMM GRANULOCYTES NFR BLD AUTO: 0.4 %
LDLC SERPL CALC-MCNC: 79 MG/DL
LYMPHOCYTES # BLD AUTO: 2.57 K/UL
LYMPHOCYTES NFR BLD AUTO: 27.5 %
MAN DIFF?: NORMAL
MCHC RBC-ENTMCNC: 29.3 PG
MCHC RBC-ENTMCNC: 30.6 GM/DL
MCV RBC AUTO: 95.7 FL
MONOCYTES # BLD AUTO: 0.73 K/UL
MONOCYTES NFR BLD AUTO: 7.8 %
NEUTROPHILS # BLD AUTO: 5.7 K/UL
NEUTROPHILS NFR BLD AUTO: 61.2 %
PLATELET # BLD AUTO: 303 K/UL
POTASSIUM SERPL-SCNC: 4.3 MMOL/L
PROT SERPL-MCNC: 7.2 G/DL
RBC # BLD: 4.2 M/UL
RBC # FLD: 14 %
SARS-COV-2 IGG SERPL IA-ACNC: <0.1 INDEX
SARS-COV-2 IGG SERPL QL IA: NEGATIVE
SODIUM SERPL-SCNC: 140 MMOL/L
TRIGL SERPL-MCNC: 130 MG/DL
TSH SERPL-ACNC: 1.47 UIU/ML
WBC # FLD AUTO: 9.33 K/UL

## 2020-06-24 ENCOUNTER — RX RENEWAL (OUTPATIENT)
Age: 75
End: 2020-06-24

## 2020-07-06 ENCOUNTER — RX RENEWAL (OUTPATIENT)
Age: 75
End: 2020-07-06

## 2020-07-21 ENCOUNTER — RX RENEWAL (OUTPATIENT)
Age: 75
End: 2020-07-21

## 2020-07-27 ENCOUNTER — RX RENEWAL (OUTPATIENT)
Age: 75
End: 2020-07-27

## 2020-08-28 ENCOUNTER — RX RENEWAL (OUTPATIENT)
Age: 75
End: 2020-08-28

## 2020-09-17 ENCOUNTER — NON-APPOINTMENT (OUTPATIENT)
Age: 75
End: 2020-09-17

## 2020-09-17 ENCOUNTER — APPOINTMENT (OUTPATIENT)
Dept: INTERNAL MEDICINE | Facility: CLINIC | Age: 75
End: 2020-09-17
Payer: MEDICARE

## 2020-09-17 VITALS
WEIGHT: 207.56 LBS | HEART RATE: 81 BPM | DIASTOLIC BLOOD PRESSURE: 82 MMHG | TEMPERATURE: 98.6 F | SYSTOLIC BLOOD PRESSURE: 148 MMHG | RESPIRATION RATE: 16 BRPM | HEIGHT: 64.5 IN | BODY MASS INDEX: 35.01 KG/M2 | OXYGEN SATURATION: 99 %

## 2020-09-17 DIAGNOSIS — Z00.00 ENCOUNTER FOR GENERAL ADULT MEDICAL EXAMINATION W/OUT ABNORMAL FINDINGS: ICD-10-CM

## 2020-09-17 PROCEDURE — 36415 COLL VENOUS BLD VENIPUNCTURE: CPT

## 2020-09-17 PROCEDURE — 93000 ELECTROCARDIOGRAM COMPLETE: CPT

## 2020-09-17 PROCEDURE — G0439: CPT

## 2020-09-17 NOTE — HISTORY OF PRESENT ILLNESS
[FreeTextEntry1] : annual wellness visit [de-identified] : Patient is a 75 year old female with a past medical history as below who presents for an annual wellness visit. Patient has not started Metformin. She denies lightheadedness or dizziness on Valsartan, Metoprolol Tartrate, and HCTZ. She denies diffuse arthralgias or myalgias on Atorvastatin Calcium and Fenofibrate. Patient notes cough/congestion in the morning since discontinuing Symbicort. She had discontinued the medication secondary to thinning skin. Patient's last mammogram was in February 2020. Patient notes losing 20 lbs since February. Patient notes past stress test of the heart revealed an abnormality. Angiogram in 2017 was negative. She currently denies chest pain, palpitations, or SOB. Patient inquires about receiving the flu vaccine for this season. She has received both Pneumonia Vaccines (Pneumovax 23, Prevnar 13). She received the Tdap (Adacel) Vaccine in February 2020. She is due to receive the Shingles Vaccine (Shingrix).

## 2020-09-17 NOTE — HEALTH RISK ASSESSMENT
[No] : In the past 12 months have you used drugs other than those required for medical reasons? No [0] : 2) Feeling down, depressed, or hopeless: Not at all (0) [] : No [Audit-CScore] : 0 [[id number]] : 0 [MammogramDate] : 02/20 [MammogramComments] : No mammographic evidence of malignancy; BI-RADS Category 1: Negative. [BoneDensityDate] : 10/12 [BoneDensityComments] : Revealed osteopenia/low bone mineral density.

## 2020-09-17 NOTE — ASSESSMENT
[FreeTextEntry1] : Patient is a 75 year old female with a past medical history as above who presents for an annual wellness visit.

## 2020-09-17 NOTE — ADDENDUM
[FreeTextEntry1] : I, Joaquin Jenkins, acted solely as scribe for Dr. Nacho Eduardo DO on this date 09/17/2020  9:00AM .\par \par All medical record entries made by the Scribe were at my, Dr. Nacho Eduardo DO direction and personally dictated by me on 09/17/2020  9:00AM. I have reviewed the chart and agree that the record accurately reflects my personal performance of the history, physical exam, assessment and plan. I have also personally directed, reviewed and agreed with the chart.\par \par

## 2020-09-17 NOTE — PHYSICAL EXAM
[No Acute Distress] : no acute distress [Well Nourished] : well nourished [Well Developed] : well developed [Well-Appearing] : well-appearing [Normal Sclera/Conjunctiva] : normal sclera/conjunctiva [PERRL] : pupils equal round and reactive to light [EOMI] : extraocular movements intact [Normal Outer Ear/Nose] : the outer ears and nose were normal in appearance [Normal Oropharynx] : the oropharynx was normal [No JVD] : no jugular venous distention [No Lymphadenopathy] : no lymphadenopathy [Supple] : supple [Thyroid Normal, No Nodules] : the thyroid was normal and there were no nodules present [No Respiratory Distress] : no respiratory distress  [No Accessory Muscle Use] : no accessory muscle use [Clear to Auscultation] : lungs were clear to auscultation bilaterally [Normal Rate] : normal rate  [Regular Rhythm] : with a regular rhythm [Normal S1, S2] : normal S1 and S2 [No Murmur] : no murmur heard [No Carotid Bruits] : no carotid bruits [No Abdominal Bruit] : a ~M bruit was not heard ~T in the abdomen [No Varicosities] : no varicosities [Pedal Pulses Present] : the pedal pulses are present [No Edema] : there was no peripheral edema [No Palpable Aorta] : no palpable aorta [No Extremity Clubbing/Cyanosis] : no extremity clubbing/cyanosis [Soft] : abdomen soft [Non Tender] : non-tender [Non-distended] : non-distended [No Masses] : no abdominal mass palpated [No HSM] : no HSM [Normal Bowel Sounds] : normal bowel sounds [Normal Posterior Cervical Nodes] : no posterior cervical lymphadenopathy [Normal Anterior Cervical Nodes] : no anterior cervical lymphadenopathy [No CVA Tenderness] : no CVA  tenderness [No Spinal Tenderness] : no spinal tenderness [No Joint Swelling] : no joint swelling [Grossly Normal Strength/Tone] : grossly normal strength/tone [No Rash] : no rash [Coordination Grossly Intact] : coordination grossly intact [No Focal Deficits] : no focal deficits [Normal Gait] : normal gait [Normal Affect] : the affect was normal [Normal Insight/Judgement] : insight and judgment were intact [de-identified] : obese

## 2020-09-17 NOTE — PLAN
[FreeTextEntry1] : Pulmonary\par asthma - recommended restarting Symbicort 160-4.5 MCG/ACT, 2 puffs BID q.d. as directed because her thinning of skin is an age related issue and likely not because of symbicort, continue ProAir HFA (Albuterol Sulfate) 108 MCG/ACT as directed, Albuterol Sulfate (2.5mg/3mL) nebulization solution p.r.n., Levalbuterol Tartrate 45 MCG/ACT p.r.n. as directed, and Singulair (Montelukast Sodium) 10mg p.o.q.d. as directed \par Cardiovascular because \par abnormal EKG (results as above) - referred to cardiologist, Dr. Shepard for possible echocardiogram and stress test \par benign essential hypertension - check EKG (results as above) - continue Valsartan 320mg p.o.q.d., Metoprolol Tartrate 100mg TID p.o.q.d., and HCTZ 25mg p.o.q.d. - continue low sodium diet and weight loss \par bilateral lower extremity edema - continue HCTZ 25mg p.o.q.d. - continue keeping legs elevated - continue low sodium diet and weight loss \par Endocrinology\par history of diabetes/hyperglycemia - continue low carbohydrate/low sugar diet - check hemoglobin A1C \par hyperlipidemia - continue Atorvastatin Calcium 10mg p.o.q.d. - continue low cholesterol/low fat diet - check FLP and LFTs\par hypertriglyceridemia - continue Fenofibrate 160mg p.o.q.d. - continue low cholesterol/low fat diet and low carbohydrate/low sugar diet - check FLP and LFTs\par continue vitamin D-3 2000 unit capsules TID p.o.q.d. with food as directed - check vitamin D\par Gastroenterology\par GERD/esophageal reflux - continue antireflux measures\par Psychiatry\par anxiety - continue Alprazolam 0.25mg p.o. p.r.n. as directed \par Immunization\par flu vaccine - recommended vaccine administration in October \par Shingrix - discussed, patient to determine if vaccine is covered under medical benefits of current insurance plan and follow up for 1st dose; otherwise, instructed to follow up at the pharmacy for vaccine administration\par \par check EKG (results as above), female panel, and UA

## 2020-09-17 NOTE — DATA REVIEWED
[FreeTextEntry1] : EKG shows sinus rhythm at 71 BPM with inverted T-waves in leads V4-V6 and ST depression in V4-V6.\par

## 2020-09-17 NOTE — REVIEW OF SYSTEMS
[Negative] : Heme/Lymph [Recent Change In Weight] : ~T recent weight change [Cough] : cough [FreeTextEntry2] : weight loss [FreeTextEntry4] : congestion [de-identified] : thinning skin

## 2020-09-20 ENCOUNTER — RX RENEWAL (OUTPATIENT)
Age: 75
End: 2020-09-20

## 2020-09-25 ENCOUNTER — RX RENEWAL (OUTPATIENT)
Age: 75
End: 2020-09-25

## 2020-09-26 ENCOUNTER — RX RENEWAL (OUTPATIENT)
Age: 75
End: 2020-09-26

## 2020-09-27 LAB
25(OH)D3 SERPL-MCNC: 47.2 NG/ML
ALBUMIN SERPL ELPH-MCNC: 4.3 G/DL
ALP BLD-CCNC: 45 U/L
ALT SERPL-CCNC: 17 U/L
ANION GAP SERPL CALC-SCNC: 11 MMOL/L
APPEARANCE: CLEAR
AST SERPL-CCNC: 25 U/L
BASOPHILS # BLD AUTO: 0.04 K/UL
BASOPHILS NFR BLD AUTO: 0.5 %
BILIRUB SERPL-MCNC: 0.6 MG/DL
BILIRUBIN URINE: NEGATIVE
BLOOD URINE: NEGATIVE
BUN SERPL-MCNC: 31 MG/DL
CALCIUM SERPL-MCNC: 9.6 MG/DL
CHLORIDE SERPL-SCNC: 101 MMOL/L
CHOLEST SERPL-MCNC: 137 MG/DL
CHOLEST/HDLC SERPL: 3 RATIO
CO2 SERPL-SCNC: 27 MMOL/L
COLOR: NORMAL
CREAT SERPL-MCNC: 1.27 MG/DL
EOSINOPHIL # BLD AUTO: 0.21 K/UL
EOSINOPHIL NFR BLD AUTO: 2.9 %
ESTIMATED AVERAGE GLUCOSE: 123 MG/DL
GLUCOSE QUALITATIVE U: NEGATIVE
GLUCOSE SERPL-MCNC: 110 MG/DL
HBA1C MFR BLD HPLC: 5.9 %
HCT VFR BLD CALC: 39.1 %
HDLC SERPL-MCNC: 45 MG/DL
HGB BLD-MCNC: 12 G/DL
IMM GRANULOCYTES NFR BLD AUTO: 0.4 %
KETONES URINE: NEGATIVE
LDLC SERPL CALC-MCNC: 72 MG/DL
LEUKOCYTE ESTERASE URINE: NEGATIVE
LYMPHOCYTES # BLD AUTO: 2.35 K/UL
LYMPHOCYTES NFR BLD AUTO: 32.1 %
MAN DIFF?: NORMAL
MCHC RBC-ENTMCNC: 30 PG
MCHC RBC-ENTMCNC: 30.7 GM/DL
MCV RBC AUTO: 97.8 FL
MONOCYTES # BLD AUTO: 0.61 K/UL
MONOCYTES NFR BLD AUTO: 8.3 %
NEUTROPHILS # BLD AUTO: 4.09 K/UL
NEUTROPHILS NFR BLD AUTO: 55.8 %
NITRITE URINE: NEGATIVE
PH URINE: 7.5
PLATELET # BLD AUTO: 332 K/UL
POTASSIUM SERPL-SCNC: 4 MMOL/L
PROT SERPL-MCNC: 6.9 G/DL
PROTEIN URINE: NEGATIVE
RBC # BLD: 4 M/UL
RBC # FLD: 13.7 %
SODIUM SERPL-SCNC: 140 MMOL/L
SPECIFIC GRAVITY URINE: 1.02
TRIGL SERPL-MCNC: 99 MG/DL
TSH SERPL-ACNC: 1.82 UIU/ML
UROBILINOGEN URINE: NORMAL
WBC # FLD AUTO: 7.33 K/UL

## 2020-10-19 DIAGNOSIS — Z23 ENCOUNTER FOR IMMUNIZATION: ICD-10-CM

## 2020-10-22 ENCOUNTER — RX RENEWAL (OUTPATIENT)
Age: 75
End: 2020-10-22

## 2020-11-16 ENCOUNTER — RX RENEWAL (OUTPATIENT)
Age: 75
End: 2020-11-16

## 2020-12-01 ENCOUNTER — RX RENEWAL (OUTPATIENT)
Age: 75
End: 2020-12-01

## 2021-01-09 ENCOUNTER — RX RENEWAL (OUTPATIENT)
Age: 76
End: 2021-01-09

## 2021-01-12 ENCOUNTER — RX RENEWAL (OUTPATIENT)
Age: 76
End: 2021-01-12

## 2021-01-19 ENCOUNTER — RX RENEWAL (OUTPATIENT)
Age: 76
End: 2021-01-19

## 2021-01-20 ENCOUNTER — RX RENEWAL (OUTPATIENT)
Age: 76
End: 2021-01-20

## 2021-02-07 ENCOUNTER — RX RENEWAL (OUTPATIENT)
Age: 76
End: 2021-02-07

## 2021-03-01 ENCOUNTER — RX RENEWAL (OUTPATIENT)
Age: 76
End: 2021-03-01

## 2021-03-11 ENCOUNTER — NON-APPOINTMENT (OUTPATIENT)
Age: 76
End: 2021-03-11

## 2021-03-18 ENCOUNTER — RX RENEWAL (OUTPATIENT)
Age: 76
End: 2021-03-18

## 2021-03-25 ENCOUNTER — APPOINTMENT (OUTPATIENT)
Dept: INTERNAL MEDICINE | Facility: CLINIC | Age: 76
End: 2021-03-25
Payer: MEDICARE

## 2021-03-25 VITALS
OXYGEN SATURATION: 97 % | SYSTOLIC BLOOD PRESSURE: 134 MMHG | HEART RATE: 76 BPM | WEIGHT: 211 LBS | BODY MASS INDEX: 35.59 KG/M2 | TEMPERATURE: 98.2 F | RESPIRATION RATE: 16 BRPM | DIASTOLIC BLOOD PRESSURE: 80 MMHG | HEIGHT: 64.5 IN

## 2021-03-25 PROCEDURE — 99214 OFFICE O/P EST MOD 30 MIN: CPT | Mod: 25

## 2021-03-25 PROCEDURE — 36415 COLL VENOUS BLD VENIPUNCTURE: CPT

## 2021-03-25 RX ORDER — MONTELUKAST 10 MG/1
10 TABLET, FILM COATED ORAL
Qty: 90 | Refills: 0 | Status: DISCONTINUED | COMMUNITY
Start: 2020-01-31 | End: 2021-03-25

## 2021-03-25 NOTE — PLAN
[FreeTextEntry1] : Pulmonary\par asthma - continue Symbicort 160-4.5 MCG/ACT, 2 puffs BID q.d. as directed; continue ProAir HFA (Albuterol Sulfate) 108 MCG/ACT p.r.n. as directed, Levalbuterol Tartrate 45 MCG/ACT p.r.n. as directed, and Albuterol Sulfate (2.5mg/3mL) nebulization solution p.r.n. as directed, Rx filled \par Cardiovascular because \par benign essential hypertension - continue Valsartan 320mg p.o.q.d., Metoprolol Tartrate 100mg TID p.o.q.d., and HCTZ 25mg p.o.q.d. - advised low sodium diet and weight loss; will continue to monitor BP \par bilateral lower extremity edema - continue HCTZ 25mg p.o.q.d. - continue keeping legs elevated - advised low sodium diet and weight loss \par hyperlipidemia - continue Atorvastatin Calcium 10mg p.o.q.d. - advised low cholesterol/low fat diet - check FLP and LFTs\par hypertriglyceridemia - continue Fenofibrate 160mg p.o.q.d. - advised low cholesterol/low fat diet and low carbohydrate/low sugar diet - check FLP and LFTs\par Endocrinology\par history of diabetes/hyperglycemia - advised low carbohydrate/low sugar diet - check hemoglobin A1C \par continue Vitamin D-3 2000 IU TID p.o.q.d. with food as directed - check Vitamin D\par Gastroenterology\par GERD/esophageal reflux - continue antireflux measures\par Psychiatry\par anxiety - continue Alprazolam 0.25mg p.o. p.r.n. as directed, Rx filled,  reviewed and scanned into chart \par Immunization\par Shingrix - discussed, recommended receiving the first dose of the vaccine 1 month after date on which she received the second dose of the COVID-19 Vaccine; patient to follow up at pharmacy for vaccine administration\par \par check female panel and hemoglobin A1C

## 2021-03-25 NOTE — ADDENDUM
[FreeTextEntry1] : I, Joaquin Jenkins, acted solely as scribe for Dr. Nacho Eduardo DO on this date 03/25/2021  8:40AM .\par \par All medical record entries made by the Scribe were at my, Dr. Nacho Eduardo DO direction and personally dictated by me on 03/25/2021  8:40AM. I have reviewed the chart and agree that the record accurately reflects my personal performance of the history, physical exam, assessment and plan. I have also personally directed, reviewed and agreed with the chart.\par

## 2021-03-25 NOTE — PHYSICAL EXAM
[No Acute Distress] : no acute distress [Well Nourished] : well nourished [Well Developed] : well developed [Well-Appearing] : well-appearing [Normal Sclera/Conjunctiva] : normal sclera/conjunctiva [PERRL] : pupils equal round and reactive to light [EOMI] : extraocular movements intact [Normal Outer Ear/Nose] : the outer ears and nose were normal in appearance [Normal Oropharynx] : the oropharynx was normal [No JVD] : no jugular venous distention [No Lymphadenopathy] : no lymphadenopathy [Supple] : supple [Thyroid Normal, No Nodules] : the thyroid was normal and there were no nodules present [No Respiratory Distress] : no respiratory distress  [No Accessory Muscle Use] : no accessory muscle use [Clear to Auscultation] : lungs were clear to auscultation bilaterally [Normal Rate] : normal rate  [Regular Rhythm] : with a regular rhythm [Normal S1, S2] : normal S1 and S2 [No Murmur] : no murmur heard [No Carotid Bruits] : no carotid bruits [No Abdominal Bruit] : a ~M bruit was not heard ~T in the abdomen [No Varicosities] : no varicosities [Pedal Pulses Present] : the pedal pulses are present [No Edema] : there was no peripheral edema [No Palpable Aorta] : no palpable aorta [No Extremity Clubbing/Cyanosis] : no extremity clubbing/cyanosis [Soft] : abdomen soft [Non Tender] : non-tender [Non-distended] : non-distended [No Masses] : no abdominal mass palpated [No HSM] : no HSM [Normal Bowel Sounds] : normal bowel sounds [Normal Posterior Cervical Nodes] : no posterior cervical lymphadenopathy [Normal Anterior Cervical Nodes] : no anterior cervical lymphadenopathy [No CVA Tenderness] : no CVA  tenderness [No Spinal Tenderness] : no spinal tenderness [No Joint Swelling] : no joint swelling [Grossly Normal Strength/Tone] : grossly normal strength/tone [No Rash] : no rash [Coordination Grossly Intact] : coordination grossly intact [No Focal Deficits] : no focal deficits [Normal Gait] : normal gait [Deep Tendon Reflexes (DTR)] : deep tendon reflexes were 2+ and symmetric [Normal Affect] : the affect was normal [Normal Insight/Judgement] : insight and judgment were intact [de-identified] : obese

## 2021-03-25 NOTE — ASSESSMENT
[FreeTextEntry1] : Patient is a 76 year old female with a past medical history as above who presents for fasting blood work, Rx refill, and general follow-up.\par

## 2021-03-25 NOTE — HEALTH RISK ASSESSMENT
[No] : In the past 12 months have you used drugs other than those required for medical reasons? No [0] : 2) Feeling down, depressed, or hopeless: Not at all (0) [] : No [Audit-CScore] : 0 [[id number]] : 0 [MammogramDate] : 03/21 [MammogramComments] : BI-RADS Category 2: Benign.  [BoneDensityDate] : 10/12 [BoneDensityComments] : Revealed osteopenia/low bone mineral density.

## 2021-03-25 NOTE — HISTORY OF PRESENT ILLNESS
[FreeTextEntry1] : fasting blood work, Rx refill, and general follow-up\par  [de-identified] : Patient is a 76 year old female with a past medical history as below who presents for fasting blood work, Rx refill, and general follow-up. Patient denies lightheadedness or dizziness on Valsartan, Metoprolol Tartrate, and HCTZ. She denies diffuse arthralgias or myalgias on Atorvastatin Calcium and Fenofibrate. She denies any gastrointestinal issues on Metformin. Asthma has been well-managed on Symbicort. Patient notes discontinuing Montelukast Sodium (Singulair) several weeks ago secondary to possible side effects (issues with balance). Patient notes increased anxiety over the course of the past several months. She notes weight gain since her last visit. Patient has received both doses of the COVID-19 Vaccine. She noted upper extremity soreness for 6 days after receiving the second dose of the vaccine. Patient has not received the Shingles (Shingrix) Vaccine. Patient requests Rx refill for Albuterol Sulfate (Nebulization Solution). Patient requests Rx refill for Alprazolam which she takes as needed for anxiety. She states the medication has been helping.

## 2021-03-25 NOTE — REVIEW OF SYSTEMS
[Negative] : Heme/Lymph [Recent Change In Weight] : ~T recent weight change [Anxiety] : anxiety [FreeTextEntry2] : weight gain [de-identified] : issues with balance

## 2021-04-01 ENCOUNTER — NON-APPOINTMENT (OUTPATIENT)
Age: 76
End: 2021-04-01

## 2021-04-01 LAB
25(OH)D3 SERPL-MCNC: 38.7 NG/ML
ALBUMIN SERPL ELPH-MCNC: 4.4 G/DL
ALP BLD-CCNC: 53 U/L
ALT SERPL-CCNC: 16 U/L
ANION GAP SERPL CALC-SCNC: 5 MMOL/L
AST SERPL-CCNC: 15 U/L
BASOPHILS # BLD AUTO: 0.05 K/UL
BASOPHILS NFR BLD AUTO: 0.6 %
BILIRUB SERPL-MCNC: 0.5 MG/DL
BUN SERPL-MCNC: 27 MG/DL
CALCIUM SERPL-MCNC: 9.4 MG/DL
CHLORIDE SERPL-SCNC: 104 MMOL/L
CHOLEST SERPL-MCNC: 138 MG/DL
CO2 SERPL-SCNC: 28 MMOL/L
CREAT SERPL-MCNC: 1.15 MG/DL
EOSINOPHIL # BLD AUTO: 0.26 K/UL
EOSINOPHIL NFR BLD AUTO: 2.9 %
ESTIMATED AVERAGE GLUCOSE: 131 MG/DL
GLUCOSE SERPL-MCNC: 110 MG/DL
HBA1C MFR BLD HPLC: 6.2 %
HCT VFR BLD CALC: 38.3 %
HDLC SERPL-MCNC: 54 MG/DL
HGB BLD-MCNC: 12.2 G/DL
IMM GRANULOCYTES NFR BLD AUTO: 0.6 %
LDLC SERPL CALC-MCNC: 67 MG/DL
LYMPHOCYTES # BLD AUTO: 2.57 K/UL
LYMPHOCYTES NFR BLD AUTO: 28.3 %
MAN DIFF?: NORMAL
MCHC RBC-ENTMCNC: 30.1 PG
MCHC RBC-ENTMCNC: 31.9 GM/DL
MCV RBC AUTO: 94.6 FL
MONOCYTES # BLD AUTO: 0.77 K/UL
MONOCYTES NFR BLD AUTO: 8.5 %
NEUTROPHILS # BLD AUTO: 5.39 K/UL
NEUTROPHILS NFR BLD AUTO: 59.1 %
NONHDLC SERPL-MCNC: 84 MG/DL
PLATELET # BLD AUTO: 351 K/UL
POTASSIUM SERPL-SCNC: 4 MMOL/L
PROT SERPL-MCNC: 7.2 G/DL
RBC # BLD: 4.05 M/UL
RBC # FLD: 13.7 %
SODIUM SERPL-SCNC: 137 MMOL/L
TRIGL SERPL-MCNC: 84 MG/DL
TSH SERPL-ACNC: 1.63 UIU/ML
WBC # FLD AUTO: 9.09 K/UL

## 2021-04-06 ENCOUNTER — NON-APPOINTMENT (OUTPATIENT)
Age: 76
End: 2021-04-06

## 2021-04-08 ENCOUNTER — NON-APPOINTMENT (OUTPATIENT)
Age: 76
End: 2021-04-08

## 2021-04-08 RX ORDER — AZELASTINE HYDROCHLORIDE 137 UG/1
0.1 SPRAY, METERED NASAL TWICE DAILY
Qty: 3 | Refills: 3 | Status: ACTIVE | COMMUNITY
Start: 2020-08-28 | End: 1900-01-01

## 2021-04-17 ENCOUNTER — RX RENEWAL (OUTPATIENT)
Age: 76
End: 2021-04-17

## 2021-05-03 ENCOUNTER — RX RENEWAL (OUTPATIENT)
Age: 76
End: 2021-05-03

## 2021-05-26 ENCOUNTER — RX RENEWAL (OUTPATIENT)
Age: 76
End: 2021-05-26

## 2021-05-28 ENCOUNTER — RX RENEWAL (OUTPATIENT)
Age: 76
End: 2021-05-28

## 2021-07-10 ENCOUNTER — RX RENEWAL (OUTPATIENT)
Age: 76
End: 2021-07-10

## 2021-07-20 ENCOUNTER — APPOINTMENT (OUTPATIENT)
Dept: INTERNAL MEDICINE | Facility: CLINIC | Age: 76
End: 2021-07-20
Payer: MEDICARE

## 2021-07-20 VITALS
HEIGHT: 64.5 IN | OXYGEN SATURATION: 98 % | RESPIRATION RATE: 16 BRPM | TEMPERATURE: 97.88 F | SYSTOLIC BLOOD PRESSURE: 142 MMHG | DIASTOLIC BLOOD PRESSURE: 84 MMHG | HEART RATE: 88 BPM | BODY MASS INDEX: 36.76 KG/M2 | WEIGHT: 218 LBS

## 2021-07-20 DIAGNOSIS — M54.5 LOW BACK PAIN: ICD-10-CM

## 2021-07-20 LAB
BILIRUB UR QL STRIP: NORMAL
CLARITY UR: CLEAR
COLLECTION METHOD: NORMAL
GLUCOSE UR-MCNC: NORMAL
HCG UR QL: 2 EU/DL
HGB UR QL STRIP.AUTO: NORMAL
KETONES UR-MCNC: NORMAL
LEUKOCYTE ESTERASE UR QL STRIP: NORMAL
NITRITE UR QL STRIP: NORMAL
PH UR STRIP: 70
PROT UR STRIP-MCNC: NORMAL
SP GR UR STRIP: 1020

## 2021-07-20 PROCEDURE — 36415 COLL VENOUS BLD VENIPUNCTURE: CPT

## 2021-07-20 PROCEDURE — 99214 OFFICE O/P EST MOD 30 MIN: CPT | Mod: 25

## 2021-07-20 PROCEDURE — 81003 URINALYSIS AUTO W/O SCOPE: CPT | Mod: QW

## 2021-07-23 NOTE — HEALTH RISK ASSESSMENT
[No] : In the past 12 months have you used drugs other than those required for medical reasons? No [0] : 2) Feeling down, depressed, or hopeless: Not at all (0) [Independent] : managing finances [] : No [Audit-CScore] : 0 [[id number]] : 0 [MammogramDate] : 03/21 [MammogramComments] : BI-RADS Category 2: Benign.  [BoneDensityDate] : 10/12 [BoneDensityComments] : Revealed osteopenia/low bone mineral density.

## 2021-07-23 NOTE — PLAN
[FreeTextEntry1] : Orthopedic -  LBP -  Refer to Physical therapy \par \par Pulmonary\par asthma - continue Symbicort 160-4.5 MCG/ACT, 2 puffs BID q.d. as directed; continue ProAir HFA (Albuterol Sulfate) 108 MCG/ACT p.r.n. as directed, Levalbuterol Tartrate 45 MCG/ACT p.r.n. as directed, and Albuterol Sulfate (2.5mg/3mL) nebulization solution p.r.n. as directed.\par \par Cardiovascular because \par benign essential hypertension - continue Valsartan 320 mg p.o.q.d., Metoprolol Tartrate 100 mg TID p.o.q.d., and HCTZ 25mg p.o.q.d. - advised low sodium diet and weight loss; will continue to monitor BP.\par  \par bilateral lower extremity edema - continue HCTZ 25mg p.o.q.d. - continue keeping legs elevated - advised low sodium diet and weight loss \par hyperlipidemia - continue Atorvastatin Calcium 10mg p.o.q.d. - advised low cholesterol/low fat diet - check FLP and LFTs\par hypertriglyceridemia - continue Fenofibrate 160mg p.o.q.d. - advised low cholesterol/low fat diet and low carbohydrate/low sugar diet - check FLP and LFTs\par Endocrinology\par history of diabetes/hyperglycemia - advised low carbohydrate/low sugar diet - check hemoglobin A1C \par continue Vitamin D-3 2000 IU TID p.o.q.d. with food as directed - check Vitamin D\par \par Patient  education  - COVID-19   Counseling and education provided to the patient.  Advised sign and symptoms of the virus.  Advised contact precautions.  Educated patient on proper hand washing and to participate in social distancing. \par Patient is in full awareness of the plan and agrees to it.  All pt question was answered.  \par \par

## 2021-07-23 NOTE — REVIEW OF SYSTEMS
[Recent Change In Weight] : ~T recent weight change [Anxiety] : anxiety [Negative] : Heme/Lymph [Back Pain] : back pain [FreeTextEntry2] : weight gain [de-identified] : issues with balance

## 2021-07-23 NOTE — COUNSELING
[Encouraged to increase physical activity] : Encouraged to increase physical activity [Good understanding] : Patient has a good understanding of disease, goals and obesity follow-up plan

## 2021-07-23 NOTE — HISTORY OF PRESENT ILLNESS
[FreeTextEntry1] : LBP, BP check  and fasting labs  [de-identified] : Mrs. FULTON is a 76 year  female, who present to the office for for evaluation of Lower back pain/ sciatic.\par States she has similar symptoms in the past.  Denies  loss of ROM to lower ext or numbness sensation. \par Would like to try PT to see if it would help.\par Also present ot the office for fasting labs and BP check.  States she taking all her medication without any side effects

## 2021-07-26 ENCOUNTER — RX RENEWAL (OUTPATIENT)
Age: 76
End: 2021-07-26

## 2021-07-28 ENCOUNTER — NON-APPOINTMENT (OUTPATIENT)
Age: 76
End: 2021-07-28

## 2021-08-03 LAB
25(OH)D3 SERPL-MCNC: 45.9 NG/ML
ALBUMIN SERPL ELPH-MCNC: 4.4 G/DL
ALP BLD-CCNC: 51 U/L
ALT SERPL-CCNC: 14 U/L
ANION GAP SERPL CALC-SCNC: 12 MMOL/L
AST SERPL-CCNC: 16 U/L
BASOPHILS # BLD AUTO: 0.06 K/UL
BASOPHILS NFR BLD AUTO: 0.5 %
BILIRUB SERPL-MCNC: 0.5 MG/DL
BUN SERPL-MCNC: 32 MG/DL
CALCIUM SERPL-MCNC: 9.4 MG/DL
CHLORIDE SERPL-SCNC: 100 MMOL/L
CHOLEST SERPL-MCNC: 135 MG/DL
CO2 SERPL-SCNC: 25 MMOL/L
CREAT SERPL-MCNC: 1.19 MG/DL
EOSINOPHIL # BLD AUTO: 0.26 K/UL
EOSINOPHIL NFR BLD AUTO: 2.2 %
ESTIMATED AVERAGE GLUCOSE: 131 MG/DL
GLUCOSE SERPL-MCNC: 98 MG/DL
HBA1C MFR BLD HPLC: 6.2 %
HCT VFR BLD CALC: 38.8 %
HDLC SERPL-MCNC: 49 MG/DL
HGB BLD-MCNC: 11.8 G/DL
IMM GRANULOCYTES NFR BLD AUTO: 0.5 %
LDLC SERPL CALC-MCNC: 67 MG/DL
LYMPHOCYTES # BLD AUTO: 2.58 K/UL
LYMPHOCYTES NFR BLD AUTO: 21.5 %
MAN DIFF?: NORMAL
MCHC RBC-ENTMCNC: 29.1 PG
MCHC RBC-ENTMCNC: 30.4 GM/DL
MCV RBC AUTO: 95.8 FL
MONOCYTES # BLD AUTO: 0.82 K/UL
MONOCYTES NFR BLD AUTO: 6.8 %
NEUTROPHILS # BLD AUTO: 8.24 K/UL
NEUTROPHILS NFR BLD AUTO: 68.5 %
NONHDLC SERPL-MCNC: 87 MG/DL
PLATELET # BLD AUTO: 368 K/UL
POTASSIUM SERPL-SCNC: 4.2 MMOL/L
PROT SERPL-MCNC: 7.4 G/DL
RBC # BLD: 4.05 M/UL
RBC # FLD: 14 %
SODIUM SERPL-SCNC: 138 MMOL/L
TRIGL SERPL-MCNC: 97 MG/DL
TSH SERPL-ACNC: 1.64 UIU/ML
WBC # FLD AUTO: 12.02 K/UL

## 2021-08-10 ENCOUNTER — APPOINTMENT (OUTPATIENT)
Dept: INTERNAL MEDICINE | Facility: CLINIC | Age: 76
End: 2021-08-10
Payer: MEDICARE

## 2021-08-10 VITALS — SYSTOLIC BLOOD PRESSURE: 148 MMHG | DIASTOLIC BLOOD PRESSURE: 82 MMHG

## 2021-08-10 VITALS
BODY MASS INDEX: 37.34 KG/M2 | OXYGEN SATURATION: 96 % | SYSTOLIC BLOOD PRESSURE: 154 MMHG | RESPIRATION RATE: 16 BRPM | DIASTOLIC BLOOD PRESSURE: 88 MMHG | HEART RATE: 81 BPM | HEIGHT: 64.5 IN | WEIGHT: 221.4 LBS | TEMPERATURE: 98.1 F

## 2021-08-10 DIAGNOSIS — R73.03 PREDIABETES.: ICD-10-CM

## 2021-08-10 PROCEDURE — 36415 COLL VENOUS BLD VENIPUNCTURE: CPT

## 2021-08-10 PROCEDURE — 99214 OFFICE O/P EST MOD 30 MIN: CPT | Mod: 25

## 2021-08-10 NOTE — HISTORY OF PRESENT ILLNESS
[FreeTextEntry1] : Non fasting labs   [de-identified] : Mrs. FULTON is a 76 year  female, who present to the office for for repeat labs.  Had elevated WBC.  States she feels well.  Denies having a fever, cold symptoms, night sweats or chills.  Denies pain with urination\par \par States Physical therapy is going well for her back pain.  Feeling much better. \par

## 2021-08-10 NOTE — PLAN
[FreeTextEntry1] : Orthopedic -  LBP - Continue with Physical therapy \par \par Heme - Leukocytosis - repeat cbc. Today remains elevated will refer to heme for evaluation \par \par Pulmonary\par asthma - continue Symbicort 160-4.5 MCG/ACT, 2 puffs BID q.d. as directed; continue ProAir HFA (Albuterol Sulfate) 108 MCG/ACT p.r.n. as directed, Levalbuterol Tartrate 45 MCG/ACT p.r.n. as directed, and Albuterol Sulfate (2.5mg/3mL) nebulization solution p.r.n. as directed.\par \par Cardiovascular because \par benign essential hypertension - continue Valsartan 320 mg p.o.q.d., Metoprolol Tartrate 100 mg TID p.o.q.d., and HCTZ 25mg p.o.q.d. - advised low sodium diet and weight loss; will continue to monitor BP.\par Stressed weight loss - Return to the office in 6 weeks for a blood pressure check \par  \par bilateral lower extremity edema - continue HCTZ 25mg p.o.q.d. - continue keeping legs elevated - advised low sodium diet and weight loss. Monitor BUN/ CREAT \par hyperlipidemia - continue Atorvastatin Calcium 10mg p.o.q.d. - advised low cholesterol/low fat diet - check FLP and LFTs\par hypertriglyceridemia - continue Fenofibrate 160mg p.o.q.d. - advised low cholesterol/low fat diet and low carbohydrate/low sugar diet - check FLP and LFTs\par \par Endocrinology\par history of diabetes/hyperglycemia - advised low carbohydrate/low sugar diet - Advised diet and weight loss- Metformin daily \par continue Vitamin D-3 2000 IU TID p.o.q.d. with food as directed - check Vitamin D\par \par Patient  education  - COVID-19   Counseling and education provided to the patient.  Advised sign and symptoms of the virus.  Advised contact precautions.  Educated patient on proper hand washing and to participate in social distancing. \par Patient is in full awareness of the plan and agrees to it.  All pt question was answered.  \par \par

## 2021-08-10 NOTE — COUNSELING
[Encouraged to increase physical activity] : Encouraged to increase physical activity [Good understanding] : Patient has a good understanding of disease, goals and obesity follow-up plan [Benefits of weight loss discussed] : Benefits of weight loss discussed [FreeTextEntry2] : Low na diet

## 2021-08-10 NOTE — REVIEW OF SYSTEMS
[Recent Change In Weight] : ~T recent weight change [Back Pain] : back pain [Anxiety] : anxiety [Negative] : Heme/Lymph [FreeTextEntry2] : weight gain [de-identified] : issues with balance

## 2021-08-10 NOTE — HEALTH RISK ASSESSMENT
[No] : In the past 12 months have you used drugs other than those required for medical reasons? No [0] : 2) Feeling down, depressed, or hopeless: Not at all (0) [Independent] : managing finances [] : No [Audit-CScore] : 0 [[id number]] : 0 [MammogramDate] : 03/21 [MammogramComments] : BI-RADS Category 2: Benign.  [BoneDensityDate] : 10/12 [BoneDensityComments] : Revealed osteopenia/low bone mineral density.

## 2021-08-10 NOTE — ASSESSMENT
[FreeTextEntry1] : Patient is a 76 year old female with a past medical history as above who presents for nonfasting blood work,

## 2021-08-10 NOTE — DATA REVIEWED
[No studies available for review at this time.] : No studies available for review at this time. [FreeTextEntry1] : Revied a1c, cbc, cmp from 07/21

## 2021-08-10 NOTE — PHYSICAL EXAM
[No Acute Distress] : no acute distress [Well Nourished] : well nourished [Well Developed] : well developed [Well-Appearing] : well-appearing [Normal Sclera/Conjunctiva] : normal sclera/conjunctiva [PERRL] : pupils equal round and reactive to light [EOMI] : extraocular movements intact [Normal Outer Ear/Nose] : the outer ears and nose were normal in appearance [Normal Oropharynx] : the oropharynx was normal [No JVD] : no jugular venous distention [No Lymphadenopathy] : no lymphadenopathy [Supple] : supple [Thyroid Normal, No Nodules] : the thyroid was normal and there were no nodules present [No Respiratory Distress] : no respiratory distress  [No Accessory Muscle Use] : no accessory muscle use [Clear to Auscultation] : lungs were clear to auscultation bilaterally [Normal Rate] : normal rate  [Regular Rhythm] : with a regular rhythm [Normal S1, S2] : normal S1 and S2 [No Murmur] : no murmur heard [No Carotid Bruits] : no carotid bruits [No Abdominal Bruit] : a ~M bruit was not heard ~T in the abdomen [No Varicosities] : no varicosities [Pedal Pulses Present] : the pedal pulses are present [No Edema] : there was no peripheral edema [No Palpable Aorta] : no palpable aorta [No Extremity Clubbing/Cyanosis] : no extremity clubbing/cyanosis [Soft] : abdomen soft [Non Tender] : non-tender [Non-distended] : non-distended [No Masses] : no abdominal mass palpated [No HSM] : no HSM [Normal Bowel Sounds] : normal bowel sounds [Normal Posterior Cervical Nodes] : no posterior cervical lymphadenopathy [Normal Anterior Cervical Nodes] : no anterior cervical lymphadenopathy [No CVA Tenderness] : no CVA  tenderness [No Spinal Tenderness] : no spinal tenderness [No Joint Swelling] : no joint swelling [Grossly Normal Strength/Tone] : grossly normal strength/tone [No Rash] : no rash [Coordination Grossly Intact] : coordination grossly intact [No Focal Deficits] : no focal deficits [Normal Gait] : normal gait [Deep Tendon Reflexes (DTR)] : deep tendon reflexes were 2+ and symmetric [Normal Affect] : the affect was normal [Normal Insight/Judgement] : insight and judgment were intact [Speech Grossly Normal] : speech grossly normal [Alert and Oriented x3] : oriented to person, place, and time [Normal Mood] : the mood was normal [de-identified] : obese

## 2021-08-15 ENCOUNTER — RX RENEWAL (OUTPATIENT)
Age: 76
End: 2021-08-15

## 2021-08-17 ENCOUNTER — NON-APPOINTMENT (OUTPATIENT)
Age: 76
End: 2021-08-17

## 2021-08-18 LAB
ALBUMIN SERPL ELPH-MCNC: 4.3 G/DL
ALP BLD-CCNC: 59 U/L
ALT SERPL-CCNC: 13 U/L
ANION GAP SERPL CALC-SCNC: 11 MMOL/L
AST SERPL-CCNC: 15 U/L
BASOPHILS # BLD AUTO: 0.06 K/UL
BASOPHILS NFR BLD AUTO: 0.5 %
BILIRUB SERPL-MCNC: 0.4 MG/DL
BUN SERPL-MCNC: 29 MG/DL
CALCIUM SERPL-MCNC: 9.5 MG/DL
CHLORIDE SERPL-SCNC: 101 MMOL/L
CO2 SERPL-SCNC: 26 MMOL/L
CREAT SERPL-MCNC: 1.25 MG/DL
EOSINOPHIL # BLD AUTO: 0.26 K/UL
EOSINOPHIL NFR BLD AUTO: 2.3 %
GLUCOSE SERPL-MCNC: 88 MG/DL
HCT VFR BLD CALC: 38.2 %
HGB BLD-MCNC: 11.2 G/DL
IMM GRANULOCYTES NFR BLD AUTO: 0.7 %
LYMPHOCYTES # BLD AUTO: 3.16 K/UL
LYMPHOCYTES NFR BLD AUTO: 27.9 %
MAN DIFF?: NORMAL
MCHC RBC-ENTMCNC: 28.2 PG
MCHC RBC-ENTMCNC: 29.3 GM/DL
MCV RBC AUTO: 96.2 FL
MONOCYTES # BLD AUTO: 1.01 K/UL
MONOCYTES NFR BLD AUTO: 8.9 %
NEUTROPHILS # BLD AUTO: 6.76 K/UL
NEUTROPHILS NFR BLD AUTO: 59.7 %
PLATELET # BLD AUTO: 396 K/UL
POTASSIUM SERPL-SCNC: 4.1 MMOL/L
PROT SERPL-MCNC: 7.5 G/DL
RBC # BLD: 3.97 M/UL
RBC # FLD: 14.5 %
SODIUM SERPL-SCNC: 139 MMOL/L
WBC # FLD AUTO: 11.33 K/UL

## 2021-09-10 ENCOUNTER — RX RENEWAL (OUTPATIENT)
Age: 76
End: 2021-09-10

## 2021-09-10 RX ORDER — LEVALBUTEROL TARTRATE 45 UG/1
45 AEROSOL, METERED ORAL
Qty: 3 | Refills: 1 | Status: ACTIVE | COMMUNITY
Start: 2020-04-28 | End: 1900-01-01

## 2021-09-28 ENCOUNTER — RX RENEWAL (OUTPATIENT)
Age: 76
End: 2021-09-28

## 2021-11-08 ENCOUNTER — RX RENEWAL (OUTPATIENT)
Age: 76
End: 2021-11-08

## 2021-11-17 ENCOUNTER — LABORATORY RESULT (OUTPATIENT)
Age: 76
End: 2021-11-17

## 2021-11-17 ENCOUNTER — RX RENEWAL (OUTPATIENT)
Age: 76
End: 2021-11-17

## 2021-11-17 ENCOUNTER — APPOINTMENT (OUTPATIENT)
Dept: INTERNAL MEDICINE | Facility: CLINIC | Age: 76
End: 2021-11-17
Payer: MEDICARE

## 2021-11-17 VITALS
TEMPERATURE: 96.4 F | DIASTOLIC BLOOD PRESSURE: 82 MMHG | BODY MASS INDEX: 37.61 KG/M2 | HEART RATE: 70 BPM | HEIGHT: 64.5 IN | OXYGEN SATURATION: 98 % | SYSTOLIC BLOOD PRESSURE: 134 MMHG | RESPIRATION RATE: 16 BRPM | WEIGHT: 223 LBS

## 2021-11-17 DIAGNOSIS — Z86.39 PERSONAL HISTORY OF OTHER ENDOCRINE, NUTRITIONAL AND METABOLIC DISEASE: ICD-10-CM

## 2021-11-17 DIAGNOSIS — R53.83 OTHER FATIGUE: ICD-10-CM

## 2021-11-17 DIAGNOSIS — R94.31 ABNORMAL ELECTROCARDIOGRAM [ECG] [EKG]: ICD-10-CM

## 2021-11-17 PROCEDURE — 99214 OFFICE O/P EST MOD 30 MIN: CPT | Mod: 25

## 2021-11-17 PROCEDURE — 36415 COLL VENOUS BLD VENIPUNCTURE: CPT

## 2021-11-17 NOTE — DATA REVIEWED
[FreeTextEntry1] : EKG dated 9/17/20 shows sinus rhythm at 71 BPM with inverted T-waves in leads V4-V6 and ST depression in V4-V6.

## 2021-11-17 NOTE — ADDENDUM
[FreeTextEntry1] : I, Joaquin Jenkins, acted solely as scribe for Dr. Nacho Eduardo DO on this date 11/17/2021  8:00AM .\par \par All medical record entries made by the Scribe were at my, Dr. Nacho Eduardo DO direction and personally dictated by me on 11/17/2021  8:00AM. I have reviewed the chart and agree that the record accurately reflects my personal performance of the history, physical exam, assessment and plan. I have also personally directed, reviewed and agreed with the chart.\par

## 2021-11-17 NOTE — PHYSICAL EXAM
[No Acute Distress] : no acute distress [Well Nourished] : well nourished [Well Developed] : well developed [Well-Appearing] : well-appearing [Normal Sclera/Conjunctiva] : normal sclera/conjunctiva [PERRL] : pupils equal round and reactive to light [EOMI] : extraocular movements intact [Normal Outer Ear/Nose] : the outer ears and nose were normal in appearance [Normal Oropharynx] : the oropharynx was normal [No JVD] : no jugular venous distention [No Lymphadenopathy] : no lymphadenopathy [Supple] : supple [Thyroid Normal, No Nodules] : the thyroid was normal and there were no nodules present [No Respiratory Distress] : no respiratory distress  [No Accessory Muscle Use] : no accessory muscle use [Clear to Auscultation] : lungs were clear to auscultation bilaterally [Normal Rate] : normal rate  [Regular Rhythm] : with a regular rhythm [Normal S1, S2] : normal S1 and S2 [No Murmur] : no murmur heard [No Carotid Bruits] : no carotid bruits [No Abdominal Bruit] : a ~M bruit was not heard ~T in the abdomen [No Varicosities] : no varicosities [Pedal Pulses Present] : the pedal pulses are present [No Edema] : there was no peripheral edema [No Palpable Aorta] : no palpable aorta [No Extremity Clubbing/Cyanosis] : no extremity clubbing/cyanosis [Soft] : abdomen soft [Non Tender] : non-tender [Non-distended] : non-distended [No Masses] : no abdominal mass palpated [No HSM] : no HSM [Normal Bowel Sounds] : normal bowel sounds [Normal Posterior Cervical Nodes] : no posterior cervical lymphadenopathy [Normal Anterior Cervical Nodes] : no anterior cervical lymphadenopathy [No CVA Tenderness] : no CVA  tenderness [No Spinal Tenderness] : no spinal tenderness [No Joint Swelling] : no joint swelling [Grossly Normal Strength/Tone] : grossly normal strength/tone [No Rash] : no rash [Coordination Grossly Intact] : coordination grossly intact [No Focal Deficits] : no focal deficits [Normal Gait] : normal gait [Deep Tendon Reflexes (DTR)] : deep tendon reflexes were 2+ and symmetric [Normal Affect] : the affect was normal [Normal Insight/Judgement] : insight and judgment were intact [de-identified] : obese

## 2021-11-17 NOTE — REVIEW OF SYSTEMS
[Fatigue] : fatigue [Recent Change In Weight] : ~T recent weight change [Dyspnea on Exertion] : dyspnea on exertion [Negative] : Heme/Lymph [FreeTextEntry2] : weight gain

## 2021-11-17 NOTE — HISTORY OF PRESENT ILLNESS
[FreeTextEntry1] : fasting blood work, Rx refill, and general follow-up\par  [de-identified] : Patient is a 76 year old female with a past medical history as below who presents for fasting blood work, Rx refill, and general follow-up. Blood work done on 7/20/21 revealed elevated WBC (12.02) and elevated BUN (32). Blood work done on 8/10/21 revealed elevated WBC (11.33), low HGB (11.2), and elevated BUN (29). Patient has not followed up with hematologist, Dr. Fisher. Patient notes SOB on exertion. She denies chest pain or palpitations. She denies following up with cardiologist, Dr. Shepard regarding abnormal EKG from her annual wellness visit in 2020. She states past Stress Test was negative. Patient notes weight gain since her last visit. She notes fatigue. Patient received the flu vaccine approximately 3 weeks ago. Patient has received 3 doses of the COVID-19 Vaccine (Moderna). She noted dizziness after receiving the booster dose of the vaccine (now resolved). Patient states she is taking all medications as prescribed and denies any adverse reactions or side effects. She denies diffuse arthralgias or myalgias on Atorvastatin Calcium and Fenofibrate. She denies any gastrointestinal issues on Metformin. Asthma has been well-managed on Symbicort. Patient had been taking antihistamines for seasonal allergy symptoms. Patient requests Rx refill for Alprazolam which she takes as needed for anxiety. She states the medication is helping.

## 2021-11-17 NOTE — HEALTH RISK ASSESSMENT
[No] : In the past 12 months have you used drugs other than those required for medical reasons? No [0] : 2) Feeling down, depressed, or hopeless: Not at all (0) [PHQ-2 Negative - No further assessment needed] : PHQ-2 Negative - No further assessment needed [Independent] : managing finances [] : No [Audit-CScore] : 0 [[id number]] : 0 [MammogramDate] : 03/21 [MammogramComments] : BI-RADS Category 2: Benign.  [BoneDensityDate] : 10/12 [BoneDensityComments] : Revealed osteopenia/low bone mineral density.

## 2021-11-17 NOTE — PLAN
[FreeTextEntry1] : Constitutional\par fatigue - possibly secondary to anemia: check anemia panel, thyroid panel, and CMP; if work-up is negative, fatigue may be secondary to weight gain: advised low carbohydrate/low sugar diet to promote weight loss  \par Pulmonary\par asthma - continue Symbicort 160-4.5 MCG/ACT, 2 puffs BID q.d. as directed; continue ProAir HFA (Albuterol Sulfate) 108 MCG/ACT p.r.n. as directed, Levalbuterol Tartrate 45 MCG/ACT p.r.n. as directed, and Albuterol Sulfate (2.5mg/3mL) nebulization solution p.r.n. as directed \par Cardiovascular \par abnormal EKG (dated 9/17/20, results noted above) - referred to cardiologist, Dr. Shepard for further evaluation/testing (possible Echocardiogram and Stress Test) especially given complaint of SOB on exertion  \par bilateral lower extremity edema - continue HCTZ 25mg p.o.q.d. as directed - check CMP - continue low sodium diet; advised weight loss\par benign essential hypertension - continue Valsartan 320mg p.o.q.d. as directed, Metoprolol Tartrate 100mg TID p.o.q.d. as directed, and HCTZ 25mg p.o.q.d. as directed - check CMP - continue low sodium diet; advised weight loss; will continue to monitor BP \par hyperlipidemia - continue Atorvastatin Calcium 10mg p.o.q.d. as directed - advised low cholesterol/low fat diet - check FLP and LFTs\par hypertriglyceridemia - continue Fenofibrate 160mg p.o.q.d. as directed - advised low cholesterol/low fat diet and low carbohydrate/low sugar diet - check FLP and LFTs\par Endocrinology\par hyperglycemia/history of diabetes - advised low carbohydrate/low sugar diet - check hemoglobin A1C \par Continue Vitamin D-3 2000 IU TID p.o.q.d. with food as directed \par Hematology\par anemia - check anemia panel\par leukocytosis - check CBC; discussed following up with hematologist, Dr. Lobato for further evaluation/testing pending results of blood work \par Nephrology\par renal insufficiency - check CMP\par Gastroenterology\par GERD/esophageal reflux - continue antireflux measures\par Psychiatry\par anxiety - continue Alprazolam 0.25mg p.o. p.r.n. as directed, Rx filled,  reviewed \par Immunization\par Shingrix - previously discussed, vaccine to be administered at patient's pharmacy\par \par check CMP, FLP, hemoglobin A1C, thyroid panel, and anemia panel

## 2021-11-21 LAB
ALBUMIN SERPL ELPH-MCNC: 4.2 G/DL
ALP BLD-CCNC: 54 U/L
ALT SERPL-CCNC: 17 U/L
ANION GAP SERPL CALC-SCNC: 14 MMOL/L
AST SERPL-CCNC: 19 U/L
BASOPHILS # BLD AUTO: 0.06 K/UL
BASOPHILS NFR BLD AUTO: 0.6 %
BILIRUB SERPL-MCNC: 0.5 MG/DL
BUN SERPL-MCNC: 32 MG/DL
CALCIUM SERPL-MCNC: 9.8 MG/DL
CHLORIDE SERPL-SCNC: 98 MMOL/L
CHOLEST SERPL-MCNC: 144 MG/DL
CO2 SERPL-SCNC: 26 MMOL/L
CREAT SERPL-MCNC: 1.41 MG/DL
EOSINOPHIL # BLD AUTO: 0.25 K/UL
EOSINOPHIL NFR BLD AUTO: 2.5 %
ESTIMATED AVERAGE GLUCOSE: 131 MG/DL
FERRITIN SERPL-MCNC: 165 NG/ML
FOLATE RBC-MCNC: 1284 NG/ML
GLUCOSE SERPL-MCNC: 115 MG/DL
HBA1C MFR BLD HPLC: 6.2 %
HCT VFR BLD CALC: 40 %
HCT VFR BLD CALC: 40.9 %
HDLC SERPL-MCNC: 50 MG/DL
HGB BLD-MCNC: 12 G/DL
IMM GRANULOCYTES NFR BLD AUTO: 0.6 %
IRON SATN MFR SERPL: 18 %
IRON SERPL-MCNC: 73 UG/DL
LDLC SERPL CALC-MCNC: 77 MG/DL
LYMPHOCYTES # BLD AUTO: 2.71 K/UL
LYMPHOCYTES NFR BLD AUTO: 27.5 %
MAN DIFF?: NORMAL
MCHC RBC-ENTMCNC: 28.9 PG
MCHC RBC-ENTMCNC: 30 GM/DL
MCV RBC AUTO: 96.4 FL
MONOCYTES # BLD AUTO: 0.83 K/UL
MONOCYTES NFR BLD AUTO: 8.4 %
NEUTROPHILS # BLD AUTO: 5.94 K/UL
NEUTROPHILS NFR BLD AUTO: 60.4 %
NONHDLC SERPL-MCNC: 94 MG/DL
PLATELET # BLD AUTO: 364 K/UL
POTASSIUM SERPL-SCNC: 4.5 MMOL/L
PROT SERPL-MCNC: 7.6 G/DL
RBC # BLD: 4.15 M/UL
RBC # FLD: 14.7 %
SODIUM SERPL-SCNC: 138 MMOL/L
T3 SERPL-MCNC: 121 NG/DL
T3FREE SERPL-MCNC: 2.91 PG/ML
T3RU NFR SERPL: 1 TBI
T4 FREE SERPL-MCNC: 1.5 NG/DL
T4 SERPL-MCNC: 9.5 UG/DL
THYROGLOB AB SERPL-ACNC: <20 IU/ML
THYROPEROXIDASE AB SERPL IA-ACNC: 21.1 IU/ML
TIBC SERPL-MCNC: 416 UG/DL
TRIGL SERPL-MCNC: 87 MG/DL
TSH SERPL-ACNC: 2.81 UIU/ML
UIBC SERPL-MCNC: 343 UG/DL
VIT B12 SERPL-MCNC: 577 PG/ML
WBC # FLD AUTO: 9.85 K/UL

## 2021-11-21 RX ORDER — METFORMIN ER 500 MG 500 MG/1
500 TABLET ORAL DAILY
Qty: 90 | Refills: 0 | Status: DISCONTINUED | COMMUNITY
Start: 2020-06-19 | End: 2021-11-21

## 2021-11-29 ENCOUNTER — RX RENEWAL (OUTPATIENT)
Age: 76
End: 2021-11-29

## 2022-01-05 ENCOUNTER — NON-APPOINTMENT (OUTPATIENT)
Age: 77
End: 2022-01-05

## 2022-01-05 ENCOUNTER — APPOINTMENT (OUTPATIENT)
Dept: CARDIOLOGY | Facility: CLINIC | Age: 77
End: 2022-01-05
Payer: MEDICARE

## 2022-01-05 ENCOUNTER — RX RENEWAL (OUTPATIENT)
Age: 77
End: 2022-01-05

## 2022-01-05 VITALS
SYSTOLIC BLOOD PRESSURE: 168 MMHG | WEIGHT: 222 LBS | HEART RATE: 81 BPM | HEIGHT: 64.5 IN | DIASTOLIC BLOOD PRESSURE: 94 MMHG | BODY MASS INDEX: 37.44 KG/M2 | OXYGEN SATURATION: 100 %

## 2022-01-05 VITALS — DIASTOLIC BLOOD PRESSURE: 88 MMHG | SYSTOLIC BLOOD PRESSURE: 148 MMHG

## 2022-01-05 PROCEDURE — 93000 ELECTROCARDIOGRAM COMPLETE: CPT

## 2022-01-05 PROCEDURE — 99204 OFFICE O/P NEW MOD 45 MIN: CPT

## 2022-01-05 NOTE — DISCUSSION/SUMMARY
[FreeTextEntry1] : Shahnaz is a 76-year-old female here for initial evaluation.  She reports dyspnea on exertion, a decrease in exercise tolerance, and lower extremity swelling.  Her blood pressure is elevated, though slightly improved on repeat evaluation.  Her EKG demonstrates a sinus rhythm, with deep anterior T wave inversions in V4 through V6.\par \par Given her dyspnea, and abnormal EKG, we will start with a 2D echocardiogram to evaluate for structural heart disease.  She reports having a catheterization in 2017 without any CAD, and I have requested an EKG from this time.  She will also have a lower extremity Doppler, to rule out a DVT, as her swelling is worse on the left.  If negative, I am going to start her on additional diuretic.  For now, she will continue her current blood pressure regimen, though elevated BP may be her issue.  We will speak after the above testing, and arrange follow-up.

## 2022-01-05 NOTE — PHYSICAL EXAM

## 2022-01-05 NOTE — REVIEW OF SYSTEMS
[SOB] : shortness of breath [Dyspnea on exertion] : dyspnea during exertion [Lower Ext Edema] : lower extremity edema [Negative] : Heme/Lymph

## 2022-01-05 NOTE — HISTORY OF PRESENT ILLNESS
[FreeTextEntry1] : Shahnaz is a 76-year-old female here for initial evaluation.\par \par She is generally healthy.  Her past medical history is notable for asthma, and hypertension, that has been generally controlled on medications.\par \par She reports seeing a cardiologist about 4 to 5 years ago.  She had a stress test which was abnormal, and subsequently a cardiac catheterization, which demonstrated no CAD in 2017.\par \par She was referred here because of an abnormal EKG.  No EKG from 2017 is available.\par \par Recently, she reports dyspnea on exertion, and a decrease in exercise tolerance.  She has no chest pain or palpitations.  She also reports lower extremity swelling, which is worse on the left.  She has no orthopnea or PND.\par \par She denies toxic habits.  Her past medical history is notable for pulmonary fibrosis in her mother.

## 2022-01-18 ENCOUNTER — APPOINTMENT (OUTPATIENT)
Dept: CARDIOLOGY | Facility: CLINIC | Age: 77
End: 2022-01-18
Payer: MEDICARE

## 2022-01-18 PROCEDURE — 93306 TTE W/DOPPLER COMPLETE: CPT

## 2022-01-18 PROCEDURE — 93970 EXTREMITY STUDY: CPT

## 2022-02-02 ENCOUNTER — RX RENEWAL (OUTPATIENT)
Age: 77
End: 2022-02-02

## 2022-03-16 ENCOUNTER — NON-APPOINTMENT (OUTPATIENT)
Age: 77
End: 2022-03-16

## 2022-03-18 ENCOUNTER — RX RENEWAL (OUTPATIENT)
Age: 77
End: 2022-03-18

## 2022-03-27 DIAGNOSIS — D64.9 ANEMIA, UNSPECIFIED: ICD-10-CM

## 2022-03-28 ENCOUNTER — RX RENEWAL (OUTPATIENT)
Age: 77
End: 2022-03-28

## 2022-03-28 ENCOUNTER — APPOINTMENT (OUTPATIENT)
Dept: INTERNAL MEDICINE | Facility: CLINIC | Age: 77
End: 2022-03-28
Payer: MEDICARE

## 2022-03-28 VITALS
HEIGHT: 64 IN | OXYGEN SATURATION: 98 % | DIASTOLIC BLOOD PRESSURE: 88 MMHG | SYSTOLIC BLOOD PRESSURE: 160 MMHG | BODY MASS INDEX: 37.9 KG/M2 | HEART RATE: 108 BPM | WEIGHT: 222 LBS | TEMPERATURE: 96.1 F | RESPIRATION RATE: 17 BRPM

## 2022-03-28 VITALS — SYSTOLIC BLOOD PRESSURE: 154 MMHG | DIASTOLIC BLOOD PRESSURE: 82 MMHG

## 2022-03-28 PROCEDURE — 36415 COLL VENOUS BLD VENIPUNCTURE: CPT

## 2022-03-28 PROCEDURE — 99214 OFFICE O/P EST MOD 30 MIN: CPT | Mod: 25

## 2022-03-28 NOTE — ADDENDUM
[FreeTextEntry1] : I, Joaquin Jenkins, acted solely as scribe for Dr. Nacho Eduardo DO on this date 03/28/2022  9:20AM .\par \par All medical record entries made by the Scribe were at my, Dr. Nacho Eduardo DO direction and personally dictated by me on 03/28/2022  9:20AM. I have reviewed the chart and agree that the record accurately reflects my personal performance of the history, physical exam, assessment and plan. I have also personally directed, reviewed and agreed with the chart.\par

## 2022-03-28 NOTE — PLAN
[FreeTextEntry1] : Pulmonary\par SILVERMAN - likely secondary to HTN; may also be partially secondary to anxiety; plan as per below \par asthma - continue Symbicort 160-4.5 MCG/ACT, 2 puffs BID q.d. as directed; continue ProAir HFA (Albuterol Sulfate) 108 MCG/ACT p.r.n. as directed, Levalbuterol Tartrate 45 MCG/ACT p.r.n. as directed, and Albuterol Sulfate (2.5mg/3mL) Nebulization Solution p.r.n. as directed \par Cardiovascular \par bilateral lower extremity edema - increase HCTZ dosage from 25mg once daily to 2 tablets p.o.q.d. as directed - check CMP - continue keeping legs elevated; continue low sodium diet; advised weight loss; RTO in 1 month to recheck electrolyte levels \par benign essential hypertension - continue Valsartan 320mg p.o.q.d. as directed and Metoprolol Tartrate 100mg TID p.o.q.d. as directed; increase HCTZ dosage from 25mg once daily to 2 tablets p.o.q.d. as directed - check CMP -  advised low sodium diet and weight loss; RTO in 1 month for BP check\par hyperlipidemia - continue Atorvastatin Calcium 10mg p.o.q.d. as directed - advised low cholesterol/low fat diet - check FLP and LFTs\par hypertriglyceridemia - continue Fenofibrate 160mg p.o.q.d. as directed - advised low cholesterol/low fat diet and low carbohydrate/low sugar diet - check FLP \par Endocrinology\par hyperglycemia/history of diabetes - advised low carbohydrate/low sugar diet - check hemoglobin A1C \par history of Vitamin D insufficiency - continue Vitamin D-3 2000 IU TID p.o.q.d. with food as directed - check Vitamin D \par history of Vitamin B12 deficiency - check Serum Vitamin B12\par Nephrology\par renal insufficiency - check CMP\par Gastroenterology\par GERD/esophageal reflux - continue antireflux measures\par Musculoskeletal\par Advised patient to call the office if she would like to restart PT\par Psychiatry\par anxiety - continue Alprazolam 0.25mg p.o. p.r.n. as directed; discussed starting every-day anxiolytic medication if the anxiety worsens \par Immunization\par Shingrix - previously discussed, vaccine to be administered at patient's pharmacy\par \par check CBC, CMP, FLP, hemoglobin A1C, Vitamin D, and Serum Vitamin B12\par RTO in 1 month for BP checking and non-fasting blood work (BMP).

## 2022-03-28 NOTE — HISTORY OF PRESENT ILLNESS
[FreeTextEntry1] : fasting blood work and general follow-up\par  [de-identified] : Patient is a 77 year old female with a past medical history as below who presents for fasting blood work and general follow-up. Patient states she is taking all medications as prescribed and denies any adverse reactions or side effects. She denies diffuse arthralgias or myalgias on Atorvastatin Calcium and Fenofibrate. Asthma has been well-managed on Symbicort. Patient notes seeing cardiologist, Dr. Shepard in January 2022 for further evaluation of an abnormal EKG. She has noted SILVERMAN which she believes is secondary to anxiety due to the pandemic and several personal issues; infrequently taking Alprazolam for the anxiety. She has also noted lower extremity edema. Echocardiogram dated 1/18/22 revealed the following: mitral annual calcification and calcified mitral leaflets with normal diastolic opening with minimal mitral regurgitation; aortic valve not well visualized, but appears calcified with no aortic valve regurgitation seen; normal aortic root size (Ao: 3.6cm at sinuses of Valsalva); proximal ascending aorta mildly dilated (3.7cm); normal left ventricular internal dimensions and wall thicknesses; normal left ventricular systolic function without segmental wall motion abnormalities; normal right ventricular size and function; estimated pulmonary artery systolic pressure equals 35mmHg, assuming right atrial pressure equals 3mmHg, consistent with borderline pulmonary pressures. Bilateral Lower Extremity Doppler dated 1/18/22 revealed no evidence of DVT in either lower extremity. Patient inquires about restarting PT.

## 2022-03-28 NOTE — HEALTH RISK ASSESSMENT
[Former] : Former [No] : In the past 12 months have you used drugs other than those required for medical reasons? No [0] : 2) Feeling down, depressed, or hopeless: Not at all (0) [PHQ-2 Negative - No further assessment needed] : PHQ-2 Negative - No further assessment needed [Independent] : managing finances [Audit-CScore] : 0 [[id number]] : 0 [MammogramDate] : 03/22 [MammogramComments] : BI-RADS Category 2: Benign.  [BoneDensityDate] : 10/12 [BoneDensityComments] : Revealed osteopenia/low bone mineral density.

## 2022-03-28 NOTE — PHYSICAL EXAM
[No Acute Distress] : no acute distress [Well Nourished] : well nourished [Well Developed] : well developed [Well-Appearing] : well-appearing [Normal Sclera/Conjunctiva] : normal sclera/conjunctiva [PERRL] : pupils equal round and reactive to light [EOMI] : extraocular movements intact [Normal Outer Ear/Nose] : the outer ears and nose were normal in appearance [Normal Oropharynx] : the oropharynx was normal [No JVD] : no jugular venous distention [No Lymphadenopathy] : no lymphadenopathy [Supple] : supple [Thyroid Normal, No Nodules] : the thyroid was normal and there were no nodules present [No Respiratory Distress] : no respiratory distress  [No Accessory Muscle Use] : no accessory muscle use [Clear to Auscultation] : lungs were clear to auscultation bilaterally [Normal Rate] : normal rate  [Regular Rhythm] : with a regular rhythm [Normal S1, S2] : normal S1 and S2 [No Murmur] : no murmur heard [No Carotid Bruits] : no carotid bruits [No Abdominal Bruit] : a ~M bruit was not heard ~T in the abdomen [No Varicosities] : no varicosities [Pedal Pulses Present] : the pedal pulses are present [No Palpable Aorta] : no palpable aorta [No Extremity Clubbing/Cyanosis] : no extremity clubbing/cyanosis [Soft] : abdomen soft [Non Tender] : non-tender [Non-distended] : non-distended [No Masses] : no abdominal mass palpated [No HSM] : no HSM [Normal Bowel Sounds] : normal bowel sounds [Normal Posterior Cervical Nodes] : no posterior cervical lymphadenopathy [Normal Anterior Cervical Nodes] : no anterior cervical lymphadenopathy [No CVA Tenderness] : no CVA  tenderness [No Spinal Tenderness] : no spinal tenderness [No Joint Swelling] : no joint swelling [Grossly Normal Strength/Tone] : grossly normal strength/tone [No Rash] : no rash [Coordination Grossly Intact] : coordination grossly intact [No Focal Deficits] : no focal deficits [Normal Gait] : normal gait [Deep Tendon Reflexes (DTR)] : deep tendon reflexes were 2+ and symmetric [Normal Affect] : the affect was normal [Normal Insight/Judgement] : insight and judgment were intact [de-identified] : \par 2+ pitting edema in LLE; trace pitting edema in RLE  [de-identified] : obese

## 2022-03-28 NOTE — REVIEW OF SYSTEMS
[Negative] : Heme/Lymph [Anxiety] : anxiety [Dyspnea on Exertion] : dyspnea on exertion [Lower Ext Edema] : lower extremity edema

## 2022-03-29 LAB
25(OH)D3 SERPL-MCNC: 41.1 NG/ML
ALBUMIN SERPL ELPH-MCNC: 4.3 G/DL
ALP BLD-CCNC: 55 U/L
ALT SERPL-CCNC: 17 U/L
ANION GAP SERPL CALC-SCNC: 13 MMOL/L
AST SERPL-CCNC: 17 U/L
BASOPHILS # BLD AUTO: 0.05 K/UL
BASOPHILS NFR BLD AUTO: 0.5 %
BILIRUB SERPL-MCNC: 0.4 MG/DL
BUN SERPL-MCNC: 23 MG/DL
CALCIUM SERPL-MCNC: 9.7 MG/DL
CHLORIDE SERPL-SCNC: 102 MMOL/L
CHOLEST SERPL-MCNC: 145 MG/DL
CO2 SERPL-SCNC: 25 MMOL/L
CREAT SERPL-MCNC: 1.18 MG/DL
EGFR: 47 ML/MIN/1.73M2
EOSINOPHIL # BLD AUTO: 0.42 K/UL
EOSINOPHIL NFR BLD AUTO: 4.2 %
ESTIMATED AVERAGE GLUCOSE: 143 MG/DL
GLUCOSE SERPL-MCNC: 117 MG/DL
HBA1C MFR BLD HPLC: 6.6 %
HCT VFR BLD CALC: 37.8 %
HDLC SERPL-MCNC: 46 MG/DL
HGB BLD-MCNC: 11.4 G/DL
IMM GRANULOCYTES NFR BLD AUTO: 0.6 %
LDLC SERPL CALC-MCNC: 76 MG/DL
LYMPHOCYTES # BLD AUTO: 2.61 K/UL
LYMPHOCYTES NFR BLD AUTO: 26.1 %
MAN DIFF?: NORMAL
MCHC RBC-ENTMCNC: 28.9 PG
MCHC RBC-ENTMCNC: 30.2 GM/DL
MCV RBC AUTO: 95.7 FL
MONOCYTES # BLD AUTO: 0.78 K/UL
MONOCYTES NFR BLD AUTO: 7.8 %
NEUTROPHILS # BLD AUTO: 6.08 K/UL
NEUTROPHILS NFR BLD AUTO: 60.8 %
NONHDLC SERPL-MCNC: 99 MG/DL
PLATELET # BLD AUTO: 403 K/UL
POTASSIUM SERPL-SCNC: 4.2 MMOL/L
PROT SERPL-MCNC: 7.3 G/DL
RBC # BLD: 3.95 M/UL
RBC # FLD: 14.1 %
SODIUM SERPL-SCNC: 140 MMOL/L
TRIGL SERPL-MCNC: 111 MG/DL
VIT B12 SERPL-MCNC: 748 PG/ML
WBC # FLD AUTO: 10 K/UL

## 2022-04-30 ENCOUNTER — RX RENEWAL (OUTPATIENT)
Age: 77
End: 2022-04-30

## 2022-05-08 ENCOUNTER — RX RENEWAL (OUTPATIENT)
Age: 77
End: 2022-05-08

## 2022-05-09 ENCOUNTER — RESULT CHARGE (OUTPATIENT)
Age: 77
End: 2022-05-09

## 2022-05-10 ENCOUNTER — NON-APPOINTMENT (OUTPATIENT)
Age: 77
End: 2022-05-10

## 2022-05-10 ENCOUNTER — APPOINTMENT (OUTPATIENT)
Dept: INTERNAL MEDICINE | Facility: CLINIC | Age: 77
End: 2022-05-10
Payer: MEDICARE

## 2022-05-10 VITALS
BODY MASS INDEX: 36.73 KG/M2 | DIASTOLIC BLOOD PRESSURE: 88 MMHG | OXYGEN SATURATION: 98 % | RESPIRATION RATE: 16 BRPM | TEMPERATURE: 97.5 F | WEIGHT: 215.13 LBS | HEIGHT: 64 IN | SYSTOLIC BLOOD PRESSURE: 178 MMHG | HEART RATE: 100 BPM

## 2022-05-10 VITALS — SYSTOLIC BLOOD PRESSURE: 150 MMHG | DIASTOLIC BLOOD PRESSURE: 82 MMHG

## 2022-05-10 DIAGNOSIS — Z01.818 ENCOUNTER FOR OTHER PREPROCEDURAL EXAMINATION: ICD-10-CM

## 2022-05-10 DIAGNOSIS — H26.9 UNSPECIFIED CATARACT: ICD-10-CM

## 2022-05-10 PROCEDURE — 99214 OFFICE O/P EST MOD 30 MIN: CPT | Mod: 25

## 2022-05-10 PROCEDURE — 93000 ELECTROCARDIOGRAM COMPLETE: CPT

## 2022-05-10 NOTE — ADDENDUM
[FreeTextEntry1] : I, Joaquin Jenkins, acted solely as scribe for Dr. Nacho Eduardo DO on this date 05/10/2022 11:50AM .\par \par All medical record entries made by the Scribe were at my, Dr. Nacho Eduardo DO direction and personally dictated by me on 05/10/2022 11:50AM. I have reviewed the chart and agree that the record accurately reflects my personal performance of the history, physical exam, assessment and plan. I have also personally directed, reviewed and agreed with the chart.\par

## 2022-05-10 NOTE — RESULTS/DATA
[] : results reviewed [de-identified] : EKG shows normal sinus rhythm at 75 BPM with poor R wave progression and LVH.

## 2022-05-10 NOTE — HISTORY OF PRESENT ILLNESS
[No Pertinent Cardiac History] : no history of aortic stenosis, atrial fibrillation, coronary artery disease, recent myocardial infarction, or implantable device/pacemaker [Asthma] : asthma [No Adverse Anesthesia Reaction] : no adverse anesthesia reaction in self or family member [Diabetes] : diabetes [(Patient denies any chest pain, claudication, dyspnea on exertion, orthopnea, palpitations or syncope)] : Patient denies any chest pain, claudication, dyspnea on exertion, orthopnea, palpitations or syncope [Good (7-10 METs)] : Good (7-10 METs) [COPD] : no COPD [Sleep Apnea] : no sleep apnea [Smoker] : not a smoker [Chronic Anticoagulation] : no chronic anticoagulation [Chronic Kidney Disease] : no chronic kidney disease [FreeTextEntry1] : right eye cataract surgery [FreeTextEntry2] : 5/23/22 [FreeTextEntry3] : Dr. Britt [FreeTextEntry4] : Patient is a 77 year-old female with a past medical history as below who presents for preoperative evaluation prior to right eye cataract surgery. The procedure will be performed by Dr. Britt at [facility]. Patient has no history of allergy or adverse reaction to anesthesia. Patient can walk many blocks and can walk up 1-2 flights of stairs without dyspnea on exertion. She denies chest pain or palpitations. Echocardiogram dated 1/18/22 revealed EF 65%.

## 2022-05-10 NOTE — PHYSICAL EXAM
[No Acute Distress] : no acute distress [Well Nourished] : well nourished [Well Developed] : well developed [Well-Appearing] : well-appearing [Normal Sclera/Conjunctiva] : normal sclera/conjunctiva [PERRL] : pupils equal round and reactive to light [EOMI] : extraocular movements intact [Normal Outer Ear/Nose] : the outer ears and nose were normal in appearance [Normal Oropharynx] : the oropharynx was normal [No JVD] : no jugular venous distention [No Lymphadenopathy] : no lymphadenopathy [Supple] : supple [Thyroid Normal, No Nodules] : the thyroid was normal and there were no nodules present [No Respiratory Distress] : no respiratory distress  [No Accessory Muscle Use] : no accessory muscle use [Clear to Auscultation] : lungs were clear to auscultation bilaterally [Normal Rate] : normal rate  [Regular Rhythm] : with a regular rhythm [Normal S1, S2] : normal S1 and S2 [No Murmur] : no murmur heard [No Carotid Bruits] : no carotid bruits [No Abdominal Bruit] : a ~M bruit was not heard ~T in the abdomen [No Varicosities] : no varicosities [Pedal Pulses Present] : the pedal pulses are present [No Palpable Aorta] : no palpable aorta [No Extremity Clubbing/Cyanosis] : no extremity clubbing/cyanosis [Soft] : abdomen soft [Non Tender] : non-tender [Non-distended] : non-distended [No Masses] : no abdominal mass palpated [No HSM] : no HSM [Normal Bowel Sounds] : normal bowel sounds [Normal Posterior Cervical Nodes] : no posterior cervical lymphadenopathy [Normal Anterior Cervical Nodes] : no anterior cervical lymphadenopathy [No CVA Tenderness] : no CVA  tenderness [No Spinal Tenderness] : no spinal tenderness [No Joint Swelling] : no joint swelling [Grossly Normal Strength/Tone] : grossly normal strength/tone [No Rash] : no rash [Coordination Grossly Intact] : coordination grossly intact [No Focal Deficits] : no focal deficits [Normal Gait] : normal gait [Deep Tendon Reflexes (DTR)] : deep tendon reflexes were 2+ and symmetric [Normal Affect] : the affect was normal [Normal Insight/Judgement] : insight and judgment were intact [de-identified] : trace edema in both lower extremities  [de-identified] : obese

## 2022-05-10 NOTE — ASSESSMENT
[Patient Optimized for Surgery] : Patient optimized for surgery [No Further Testing Recommended] : no further testing recommended [Continue medications as is] : Continue current medications [FreeTextEntry4] : The patient is a 77 year-old female who is an intermediate risk surgical patient with good functional capacity going for a low risk surgical procedure.

## 2022-05-10 NOTE — PLAN
[FreeTextEntry1] : 1. Preoperative EKG performed - results are noted above. \par 2. Patient was instructed to stop eating prior to midnight the evening before the procedure.\par 3. There is no medical contraindication to the planned procedure and the patient is therefore medically optimized/cleared for the procedure. \par \par

## 2022-05-12 LAB
ESTIMATED AVERAGE GLUCOSE: 131 MG/DL
HBA1C MFR BLD HPLC: 6.2 %

## 2022-06-08 ENCOUNTER — RX RENEWAL (OUTPATIENT)
Age: 77
End: 2022-06-08

## 2022-06-12 ENCOUNTER — RX RENEWAL (OUTPATIENT)
Age: 77
End: 2022-06-12

## 2022-06-25 ENCOUNTER — RX RENEWAL (OUTPATIENT)
Age: 77
End: 2022-06-25

## 2022-08-08 ENCOUNTER — RX RENEWAL (OUTPATIENT)
Age: 77
End: 2022-08-08

## 2022-08-31 ENCOUNTER — RX RENEWAL (OUTPATIENT)
Age: 77
End: 2022-08-31

## 2022-09-11 ENCOUNTER — RX RENEWAL (OUTPATIENT)
Age: 77
End: 2022-09-11

## 2022-10-11 ENCOUNTER — APPOINTMENT (OUTPATIENT)
Dept: INTERNAL MEDICINE | Facility: CLINIC | Age: 77
End: 2022-10-11

## 2022-10-11 VITALS
WEIGHT: 225 LBS | RESPIRATION RATE: 16 BRPM | HEART RATE: 69 BPM | BODY MASS INDEX: 38.41 KG/M2 | SYSTOLIC BLOOD PRESSURE: 148 MMHG | HEIGHT: 64 IN | TEMPERATURE: 97.2 F | OXYGEN SATURATION: 98 % | DIASTOLIC BLOOD PRESSURE: 80 MMHG

## 2022-10-11 VITALS — DIASTOLIC BLOOD PRESSURE: 82 MMHG | SYSTOLIC BLOOD PRESSURE: 140 MMHG

## 2022-10-11 DIAGNOSIS — E53.8 DEFICIENCY OF OTHER SPECIFIED B GROUP VITAMINS: ICD-10-CM

## 2022-10-11 PROCEDURE — 36415 COLL VENOUS BLD VENIPUNCTURE: CPT

## 2022-10-11 PROCEDURE — 99214 OFFICE O/P EST MOD 30 MIN: CPT | Mod: 25

## 2022-10-11 NOTE — ASSESSMENT
[FreeTextEntry1] : Patient is a 77 year old female with a past medical history as above who presents for fasting blood work and general follow-up.

## 2022-10-11 NOTE — HEALTH RISK ASSESSMENT
[Former] : Former [No] : In the past 12 months have you used drugs other than those required for medical reasons? No [0] : 2) Feeling down, depressed, or hopeless: Not at all (0) [PHQ-2 Negative - No further assessment needed] : PHQ-2 Negative - No further assessment needed [Independent] : managing finances [Audit-CScore] : 0 [[id number]] : 0 [MammogramDate] : 03/22 [MammogramComments] : BI-RADS Category 2: Benign.  [BoneDensityDate] : 10/12 [ColonoscopyComments] : Results to be requested from Dr. Frye's office. [BoneDensityComments] : Revealed osteopenia/low bone mineral density.

## 2022-10-11 NOTE — PHYSICAL EXAM
[No Acute Distress] : no acute distress [Well Nourished] : well nourished [Well Developed] : well developed [Well-Appearing] : well-appearing [Normal Sclera/Conjunctiva] : normal sclera/conjunctiva [PERRL] : pupils equal round and reactive to light [EOMI] : extraocular movements intact [Normal Outer Ear/Nose] : the outer ears and nose were normal in appearance [Normal Oropharynx] : the oropharynx was normal [No JVD] : no jugular venous distention [No Lymphadenopathy] : no lymphadenopathy [Supple] : supple [Thyroid Normal, No Nodules] : the thyroid was normal and there were no nodules present [No Respiratory Distress] : no respiratory distress  [No Accessory Muscle Use] : no accessory muscle use [Clear to Auscultation] : lungs were clear to auscultation bilaterally [Normal Rate] : normal rate  [Regular Rhythm] : with a regular rhythm [Normal S1, S2] : normal S1 and S2 [No Murmur] : no murmur heard [No Carotid Bruits] : no carotid bruits [No Abdominal Bruit] : a ~M bruit was not heard ~T in the abdomen [No Varicosities] : no varicosities [Pedal Pulses Present] : the pedal pulses are present [No Palpable Aorta] : no palpable aorta [No Extremity Clubbing/Cyanosis] : no extremity clubbing/cyanosis [Soft] : abdomen soft [Non Tender] : non-tender [Non-distended] : non-distended [No Masses] : no abdominal mass palpated [No HSM] : no HSM [Normal Bowel Sounds] : normal bowel sounds [Normal Posterior Cervical Nodes] : no posterior cervical lymphadenopathy [Normal Anterior Cervical Nodes] : no anterior cervical lymphadenopathy [No CVA Tenderness] : no CVA  tenderness [No Spinal Tenderness] : no spinal tenderness [No Joint Swelling] : no joint swelling [Grossly Normal Strength/Tone] : grossly normal strength/tone [No Rash] : no rash [Coordination Grossly Intact] : coordination grossly intact [No Focal Deficits] : no focal deficits [Normal Gait] : normal gait [Deep Tendon Reflexes (DTR)] : deep tendon reflexes were 2+ and symmetric [Normal Affect] : the affect was normal [Normal Insight/Judgement] : insight and judgment were intact [Normal Voice/Communication] : normal voice/communication [Normal TMs] : both tympanic membranes were normal [No Hernias] : no hernias [Normal Supraclavicular Nodes] : no supraclavicular lymphadenopathy [Kyphosis] : kyphosis [No Skin Lesions] : no skin lesions [Acne] : no acne [Speech Grossly Normal] : speech grossly normal [Alert and Oriented x3] : oriented to person, place, and time [Normal Mood] : the mood was normal [de-identified] : trace bilateral lower extremity edema, left worse than right  [de-identified] : obese

## 2022-10-11 NOTE — PLAN
[FreeTextEntry1] : Pulmonary\par asthma - continue Symbicort (Budesonide Formoterol Fumarate) 160-4.5 MCG/ACT, 2 puffs BID q.d. as directed; continue ProAir HFA (Albuterol Sulfate) 108 MCG/ACT p.r.n. as directed, Levalbuterol Tartrate 45 MCG/ACT p.r.n. as directed, and Albuterol Sulfate (2.5mg/3mL) Nebulization Solution p.r.n. as directed \par Cardiovascular \par bilateral lower extremity edema - continue HCTZ 25mg p.o.q.d. as directed - check CMP - continue keeping legs elevated; advised low sodium diet and weight loss\par benign essential hypertension - continue Valsartan 320mg p.o.q.d. as directed, Metoprolol Tartrate 100mg TID p.o.q.d. as directed, and HCTZ 25mg p.o.q.d. as directed - check CMP -  advised low sodium diet and weight loss; will continue to monitor BP\par hyperlipidemia - continue Atorvastatin Calcium 10mg p.o.q.d. as directed - advised low cholesterol/low fat diet - check FLP and LFTs\par hypertriglyceridemia - continue Fenofibrate 160mg p.o.q.d. as directed - advised low cholesterol/low fat diet and low carbohydrate/low sugar diet - check FLP \par Continue to follow up with cardiologist, Dr. Shepard\par Endocrinology\par hyperglycemia/history of diabetes - continue Metformin HCl ER 500mg p.o.q.d. as directed - advised low carbohydrate/low sugar diet; previously recommended gradually increasing CV exercise - check hemoglobin A1C and Urine Albumin/Creatinine Ratio\par history of Vitamin D insufficiency - continue Vitamin D-3 2000 IU TID p.o.q.d. with food as directed - check Vitamin D \par history of Vitamin B12 deficiency - check Serum Vitamin B12\par Nephrology\par renal insufficiency - check CMP\par Gastroenterology\par Results of last screening colonoscopy to be requested from gastroenterologist, Dr. Frye's office\par GERD/esophageal reflux - continue antireflux measures\par Psychiatry\par anxiety - continue Alprazolam 0.25mg p.o. p.r.n. as directed; previously discussed starting every-day anxiolytic medication if anxiety worsens \par insomnia - discussed starting Trazodone, declined at the current time\par Immunization\par Patient to receive flu vaccine next week at [facility]\par Shingrix - 2nd dose to be administered at pharmacy in 1-5 months \par S/p COVID-19 Vaccine - recommended following up at pharmacy for updated COVID-19 vaccine booster\par \par check CBC, CMP, FLP, hemoglobin A1C, Vitamin D, Serum Vitamin B12, and Urine Albumin/Creatinine Ratio

## 2022-10-11 NOTE — REVIEW OF SYSTEMS
[Negative] : Heme/Lymph [Lower Ext Edema] : lower extremity edema [Insomnia] : insomnia [Memory Loss] : memory loss

## 2022-10-11 NOTE — HISTORY OF PRESENT ILLNESS
[de-identified] : Patient is a 77 year old female with a past medical history as below who presents for fasting blood work and general follow-up. \par \par Patient states she is taking all medications as prescribed and denies any adverse reactions or side effects. She denies lightheadedness or dizziness on Valsartan, Metoprolol Tartrate, and HCTZ. She denies diffuse arthralgias/myalgias on Atorvastatin Calcium/Fenofibrate. She denies any gastrointestinal issues on Metformin. Asthma has been well-managed on Symbicort. \par \par Patient's last screening colonoscopy was 3-4 years ago with gastroenterologist, Dr. Frye. \par \par Patient notes lower extremity edema. (chronic L>R)\par \par Patient has noted issues with sleep. She states she averages 4 hours of sleep per night. She occasionally notes nodding off while watching television. She states she does not take naps during the day. She denies ever falling asleep while driving.\par Patient has noted issues with memory.\par \par Patient started exercising again (strength training, 1x per week).\par \par Patient will be receiving the flu vaccine next week at the [facility]. She received the first dose of the Shingles (Shingrix) Vaccine on 9/9/22. She has not yet received the updated COVID-19 vaccine booster.  [FreeTextEntry1] : fasting blood work and general follow-up

## 2022-10-11 NOTE — ADDENDUM
[FreeTextEntry1] : I, Joaquin Jenkins, acted solely as scribe for Dr. Nacho Eduardo DO on this date 10/11/2022  9:00AM .\par \par All medical record entries made by the Scribe were at my, Dr. Nacho Eduardo DO direction and personally dictated by me on 10/11/2022  9:00AM. I have reviewed the chart and agree that the record accurately reflects my personal performance of the history, physical exam, assessment and plan. I have also personally directed, reviewed and agreed with the chart.

## 2022-10-12 LAB
25(OH)D3 SERPL-MCNC: 67.7 NG/ML
ALBUMIN SERPL ELPH-MCNC: 4.3 G/DL
ALP BLD-CCNC: 52 U/L
ALT SERPL-CCNC: 19 U/L
ANION GAP SERPL CALC-SCNC: 13 MMOL/L
AST SERPL-CCNC: 24 U/L
BASOPHILS # BLD AUTO: 0.04 K/UL
BASOPHILS NFR BLD AUTO: 0.4 %
BILIRUB SERPL-MCNC: 0.6 MG/DL
BUN SERPL-MCNC: 34 MG/DL
CALCIUM SERPL-MCNC: 9.2 MG/DL
CHLORIDE SERPL-SCNC: 100 MMOL/L
CHOLEST SERPL-MCNC: 151 MG/DL
CO2 SERPL-SCNC: 27 MMOL/L
CREAT SERPL-MCNC: 1.25 MG/DL
CREAT SPEC-SCNC: 78 MG/DL
EGFR: 44 ML/MIN/1.73M2
EOSINOPHIL # BLD AUTO: 0.25 K/UL
EOSINOPHIL NFR BLD AUTO: 2.4 %
ESTIMATED AVERAGE GLUCOSE: 146 MG/DL
GLUCOSE SERPL-MCNC: 103 MG/DL
HBA1C MFR BLD HPLC: 6.7 %
HCT VFR BLD CALC: 39.8 %
HDLC SERPL-MCNC: 48 MG/DL
HGB BLD-MCNC: 12.2 G/DL
IMM GRANULOCYTES NFR BLD AUTO: 0.6 %
LDLC SERPL CALC-MCNC: 80 MG/DL
LYMPHOCYTES # BLD AUTO: 3.12 K/UL
LYMPHOCYTES NFR BLD AUTO: 29.9 %
MAN DIFF?: NORMAL
MCHC RBC-ENTMCNC: 29.3 PG
MCHC RBC-ENTMCNC: 30.7 GM/DL
MCV RBC AUTO: 95.7 FL
MICROALBUMIN 24H UR DL<=1MG/L-MCNC: <1.2 MG/DL
MICROALBUMIN/CREAT 24H UR-RTO: NORMAL MG/G
MONOCYTES # BLD AUTO: 0.88 K/UL
MONOCYTES NFR BLD AUTO: 8.4 %
NEUTROPHILS # BLD AUTO: 6.09 K/UL
NEUTROPHILS NFR BLD AUTO: 58.3 %
NONHDLC SERPL-MCNC: 104 MG/DL
PLATELET # BLD AUTO: 342 K/UL
POTASSIUM SERPL-SCNC: 4.6 MMOL/L
PROT SERPL-MCNC: 7.4 G/DL
RBC # BLD: 4.16 M/UL
RBC # FLD: 14.9 %
SODIUM SERPL-SCNC: 139 MMOL/L
TRIGL SERPL-MCNC: 118 MG/DL
WBC # FLD AUTO: 10.44 K/UL

## 2022-10-19 LAB — VIT B2 SERPL-MCNC: 215 UG/L

## 2022-11-03 ENCOUNTER — RX RENEWAL (OUTPATIENT)
Age: 77
End: 2022-11-03

## 2022-11-06 ENCOUNTER — RX RENEWAL (OUTPATIENT)
Age: 77
End: 2022-11-06

## 2022-12-04 ENCOUNTER — RX RENEWAL (OUTPATIENT)
Age: 77
End: 2022-12-04

## 2022-12-16 ENCOUNTER — RX RENEWAL (OUTPATIENT)
Age: 77
End: 2022-12-16

## 2022-12-18 ENCOUNTER — RX RENEWAL (OUTPATIENT)
Age: 77
End: 2022-12-18

## 2023-01-02 ENCOUNTER — RX RENEWAL (OUTPATIENT)
Age: 78
End: 2023-01-02

## 2023-01-30 ENCOUNTER — RX RENEWAL (OUTPATIENT)
Age: 78
End: 2023-01-30

## 2023-02-23 ENCOUNTER — RX RENEWAL (OUTPATIENT)
Age: 78
End: 2023-02-23

## 2023-03-19 ENCOUNTER — RX RENEWAL (OUTPATIENT)
Age: 78
End: 2023-03-19

## 2023-03-20 ENCOUNTER — APPOINTMENT (OUTPATIENT)
Dept: INTERNAL MEDICINE | Facility: CLINIC | Age: 78
End: 2023-03-20
Payer: MEDICARE

## 2023-03-20 ENCOUNTER — RX RENEWAL (OUTPATIENT)
Age: 78
End: 2023-03-20

## 2023-03-20 VITALS
WEIGHT: 215 LBS | HEIGHT: 64 IN | TEMPERATURE: 98 F | OXYGEN SATURATION: 99 % | BODY MASS INDEX: 36.7 KG/M2 | RESPIRATION RATE: 16 BRPM | SYSTOLIC BLOOD PRESSURE: 136 MMHG | HEART RATE: 108 BPM | DIASTOLIC BLOOD PRESSURE: 80 MMHG

## 2023-03-20 DIAGNOSIS — Z86.39 PERSONAL HISTORY OF OTHER ENDOCRINE, NUTRITIONAL AND METABOLIC DISEASE: ICD-10-CM

## 2023-03-20 PROCEDURE — 36415 COLL VENOUS BLD VENIPUNCTURE: CPT

## 2023-03-20 PROCEDURE — 99215 OFFICE O/P EST HI 40 MIN: CPT | Mod: 25

## 2023-03-20 RX ORDER — METFORMIN HYDROCHLORIDE 500 MG/1
500 TABLET, COATED ORAL
Qty: 180 | Refills: 0 | Status: DISCONTINUED | COMMUNITY
Start: 2022-10-13 | End: 2023-03-20

## 2023-03-20 NOTE — HISTORY OF PRESENT ILLNESS
[FreeTextEntry1] : fasting blood work and general follow-up [de-identified] : Patient is a 77 year old female with a past medical history as below who presents for fasting blood work and general follow-up. \par \par Patient notes she has not started taking Metformin because she read the side effects online and is nervous she will become sick from the medication. Otherwise she is taking all medications as prescribed and denies any adverse reactions or side effects. She denies lightheadedness or dizziness on Valsartan, Metoprolol Tartrate, and HCTZ. She denies diffuse arthralgias/myalgias on Atorvastatin Calcium/Fenofibrate.  Asthma has been well-managed on Symbicort. \par \par Blood work done on 10/11/22 found elevated A1c (6.7), elevated BUN (34) and stable CBC, FLP, Vitamin D.\par \par Patient's last screening colonoscopy was 3-4 years ago with gastroenterologist, Dr. Frye. She is going for another colonoscopy this year but previous gastroenterologist retired and asked for referral for new GI. Patient had mammogram done 3/13/23, results normal.\par \par Patient has started to follow a low carbohydrate/sugar diet but notes recently cheating on her diet since it was her [birth year].\par \par Patient notes bilateral LE edema has improved since wearing compression socks and regular exercise. \par \par Patient has noted issues with sleep. She states she averages 4 hours of sleep per night. She occasionally notes nodding off while watching television. She states she does not take naps during the day. She denies ever falling asleep while driving.\par \par Patient notes see ophthalmologist  for annual eye exams. She reports recent exam found no diabetic related pathology.\par \par Patient received flu vaccine this season at [facility]. She received the first dose of the Shingles (Shingrix) Vaccine on 9/9/22. She has not yet received the updated COVID-19 vaccine booster.

## 2023-03-20 NOTE — PHYSICAL EXAM
[No Acute Distress] : no acute distress [Well Nourished] : well nourished [Well Developed] : well developed [Well-Appearing] : well-appearing [Normal Sclera/Conjunctiva] : normal sclera/conjunctiva [PERRL] : pupils equal round and reactive to light [EOMI] : extraocular movements intact [Normal Outer Ear/Nose] : the outer ears and nose were normal in appearance [Normal Oropharynx] : the oropharynx was normal [No JVD] : no jugular venous distention [No Lymphadenopathy] : no lymphadenopathy [Supple] : supple [Thyroid Normal, No Nodules] : the thyroid was normal and there were no nodules present [No Respiratory Distress] : no respiratory distress  [No Accessory Muscle Use] : no accessory muscle use [Clear to Auscultation] : lungs were clear to auscultation bilaterally [Normal Rate] : normal rate  [Regular Rhythm] : with a regular rhythm [Normal S1, S2] : normal S1 and S2 [No Murmur] : no murmur heard [No Carotid Bruits] : no carotid bruits [No Abdominal Bruit] : a ~M bruit was not heard ~T in the abdomen [Pedal Pulses Present] : the pedal pulses are present [No Palpable Aorta] : no palpable aorta [No Extremity Clubbing/Cyanosis] : no extremity clubbing/cyanosis [Soft] : abdomen soft [Non Tender] : non-tender [Non-distended] : non-distended [No Masses] : no abdominal mass palpated [No HSM] : no HSM [Normal Bowel Sounds] : normal bowel sounds [Normal Posterior Cervical Nodes] : no posterior cervical lymphadenopathy [Normal Anterior Cervical Nodes] : no anterior cervical lymphadenopathy [No CVA Tenderness] : no CVA  tenderness [No Spinal Tenderness] : no spinal tenderness [No Joint Swelling] : no joint swelling [Grossly Normal Strength/Tone] : grossly normal strength/tone [No Rash] : no rash [Coordination Grossly Intact] : coordination grossly intact [No Focal Deficits] : no focal deficits [Normal Gait] : normal gait [Normal Affect] : the affect was normal [Normal Insight/Judgement] : insight and judgment were intact [Normal Voice/Communication] : normal voice/communication [Normal TMs] : both tympanic membranes were normal [Normal Nasal Mucosa] : the nasal mucosa was normal [No Hernias] : no hernias [Normal Supraclavicular Nodes] : no supraclavicular lymphadenopathy [Kyphosis] : kyphosis [Scoliosis] : no scoliosis [Acne] : no acne [Speech Grossly Normal] : speech grossly normal [Memory Grossly Normal] : memory grossly normal [Alert and Oriented x3] : oriented to person, place, and time [Normal Mood] : the mood was normal [de-identified] : trace ankle edema bilaterally, varicose veins/spider veins bilaterally [de-identified] : obese

## 2023-03-20 NOTE — PLAN
[FreeTextEntry1] : Pulmonary\par asthma - continue Symbicort (Budesonide Formoterol Fumarate) 160-4.5 MCG/ACT, 2 puffs BID q.d. as directed; continue ProAir HFA (Albuterol Sulfate) 108 MCG/ACT p.r.n. as directed, Levalbuterol Tartrate 45 MCG/ACT p.r.n. as directed, and Albuterol Sulfate (2.5mg/3mL) Nebulization Solution p.r.n. as directed \par Cardiovascular \par bilateral lower extremity edema/venous insufficiency - continue HCTZ 25mg p.o.q.d. as directed, Rx filled - check CMP - continue keeping legs elevated; advised low sodium diet and weight loss\par benign essential hypertension - continue Valsartan 320mg p.o.q.d. as directed, Metoprolol Tartrate 100mg TID p.o.q.d. as directed, and HCTZ 25mg p.o.q.d. as directed - check CMP -  advised low sodium diet and weight loss; will continue to monitor BP\par hyperlipidemia - continue Atorvastatin Calcium 10mg p.o.q.d. as directed - advised low cholesterol/low fat diet - check FLP and LFTs\par hypertriglyceridemia - continue Fenofibrate 160mg p.o.q.d. as directed - advised low cholesterol/low fat diet and low carbohydrate/low sugar diet - check FLP \par Continue to follow up with cardiologist, Dr. Shepard\par Endocrinology\par DM - discussed Metformin ER in length and strongly advised to start Metformin HCl ER 500mg p.o.q.d. as directed, pt agreed to start, Rx filled - discussed Ozempic for DM control and weight loss, pt declined - advised low carbohydrate/low sugar diet; recommend gradually increasing CV exercise -  continue f/u with ophthalmologist Dr. Britt for annual diabetic eye exams- check hemoglobin A1C and urine for albumin/creatinine ratio\par history of Vitamin D insufficiency - continue Vitamin D-3 2000 IU TID p.o.q.d. with food as directed - check Vitamin D \par history of Vitamin B12 deficiency - check Serum Vitamin B12\par Nephrology\par renal insufficiency - check CMP, on ARB to help prevent DM nephropathy, check urine for albumin/creatinine ratio\par Gastroenterology\par refer to GI Dr. Vannessa Dewitt for colonoscopy\par GERD/esophageal reflux - continue antireflux measures\par Psychiatry\par anxiety - continue Alprazolam 0.25mg p.o. p.r.n. as directed; previously discussed starting every-day anxiolytic medication if anxiety worsens \par insomnia - previously discussed starting Trazodone, declined \par Immunization\par S/p COVID-19 Vaccine - recommended following up at pharmacy for updated COVID-19 vaccine booster\par \par check CBC, CMP, FLP, hemoglobin A1C, Vitamin D, Serum Vitamin B12, and Urine Albumin/Creatinine Ratio \par RTO in 6 months

## 2023-03-20 NOTE — END OF VISIT
[FreeTextEntry3] : This note was written by Vanessa Parada on 03/20/2023, acting as a scribe for Dr. Nacho Eduardo DO.\par \par All medic record entries were at my, Dr. Nacho Eduardo DO , direction and personally dictated by me in 03/20/2023. I have personally reviewed the chart and agree that the record accurately reflects my personal performance of the history, physical exam, assessment, and plan.\par   [Time Spent: ___ minutes] : I have spent [unfilled] minutes of time on the encounter.

## 2023-03-31 LAB
25(OH)D3 SERPL-MCNC: 49.3 NG/ML
ALBUMIN SERPL ELPH-MCNC: 4.2 G/DL
ALP BLD-CCNC: 53 U/L
ALT SERPL-CCNC: 15 U/L
ANION GAP SERPL CALC-SCNC: 11 MMOL/L
AST SERPL-CCNC: 21 U/L
BASOPHILS # BLD AUTO: 0.05 K/UL
BASOPHILS NFR BLD AUTO: 0.5 %
BILIRUB SERPL-MCNC: 0.6 MG/DL
BUN SERPL-MCNC: 30 MG/DL
CALCIUM SERPL-MCNC: 9.6 MG/DL
CHLORIDE SERPL-SCNC: 100 MMOL/L
CHOLEST SERPL-MCNC: 147 MG/DL
CO2 SERPL-SCNC: 28 MMOL/L
CREAT SERPL-MCNC: 1.25 MG/DL
CREAT SPEC-SCNC: 39 MG/DL
EGFR: 44 ML/MIN/1.73M2
EOSINOPHIL # BLD AUTO: 0.31 K/UL
EOSINOPHIL NFR BLD AUTO: 2.9 %
ESTIMATED AVERAGE GLUCOSE: 131 MG/DL
GLUCOSE SERPL-MCNC: 111 MG/DL
HBA1C MFR BLD HPLC: 6.2 %
HCT VFR BLD CALC: 38.5 %
HDLC SERPL-MCNC: 48 MG/DL
HGB BLD-MCNC: 11.6 G/DL
IMM GRANULOCYTES NFR BLD AUTO: 0.4 %
LDLC SERPL CALC-MCNC: 77 MG/DL
LYMPHOCYTES # BLD AUTO: 3.01 K/UL
LYMPHOCYTES NFR BLD AUTO: 28.4 %
MAN DIFF?: NORMAL
MCHC RBC-ENTMCNC: 28.6 PG
MCHC RBC-ENTMCNC: 30.1 GM/DL
MCV RBC AUTO: 95.1 FL
MICROALBUMIN 24H UR DL<=1MG/L-MCNC: <1.2 MG/DL
MICROALBUMIN/CREAT 24H UR-RTO: NORMAL MG/G
MONOCYTES # BLD AUTO: 0.7 K/UL
MONOCYTES NFR BLD AUTO: 6.6 %
NEUTROPHILS # BLD AUTO: 6.49 K/UL
NEUTROPHILS NFR BLD AUTO: 61.2 %
NONHDLC SERPL-MCNC: 98 MG/DL
PLATELET # BLD AUTO: 336 K/UL
POTASSIUM SERPL-SCNC: 4.2 MMOL/L
PROT SERPL-MCNC: 7.2 G/DL
RBC # BLD: 4.05 M/UL
RBC # FLD: 14.3 %
SODIUM SERPL-SCNC: 139 MMOL/L
TRIGL SERPL-MCNC: 105 MG/DL
TSH SERPL-ACNC: 1.68 UIU/ML
VIT B12 SERPL-MCNC: 791 PG/ML
WBC # FLD AUTO: 10.6 K/UL

## 2023-04-03 ENCOUNTER — NON-APPOINTMENT (OUTPATIENT)
Age: 78
End: 2023-04-03

## 2023-05-08 RX ORDER — AZELASTINE HYDROCHLORIDE 137 UG/1
137 SPRAY, METERED NASAL
Qty: 3 | Refills: 1 | Status: ACTIVE | COMMUNITY
Start: 2019-02-28 | End: 1900-01-01

## 2023-05-11 ENCOUNTER — RX RENEWAL (OUTPATIENT)
Age: 78
End: 2023-05-11

## 2023-05-18 ENCOUNTER — RX RENEWAL (OUTPATIENT)
Age: 78
End: 2023-05-18

## 2023-07-05 ENCOUNTER — NON-APPOINTMENT (OUTPATIENT)
Age: 78
End: 2023-07-05

## 2023-07-05 ENCOUNTER — APPOINTMENT (OUTPATIENT)
Dept: CARDIOLOGY | Facility: CLINIC | Age: 78
End: 2023-07-05
Payer: MEDICARE

## 2023-07-05 VITALS
OXYGEN SATURATION: 100 % | WEIGHT: 216 LBS | SYSTOLIC BLOOD PRESSURE: 150 MMHG | BODY MASS INDEX: 36.88 KG/M2 | HEART RATE: 79 BPM | HEIGHT: 64 IN | DIASTOLIC BLOOD PRESSURE: 86 MMHG

## 2023-07-05 VITALS — DIASTOLIC BLOOD PRESSURE: 82 MMHG | SYSTOLIC BLOOD PRESSURE: 138 MMHG

## 2023-07-05 DIAGNOSIS — R07.89 OTHER CHEST PAIN: ICD-10-CM

## 2023-07-05 PROCEDURE — 99214 OFFICE O/P EST MOD 30 MIN: CPT

## 2023-07-05 PROCEDURE — 93000 ELECTROCARDIOGRAM COMPLETE: CPT

## 2023-07-05 NOTE — PHYSICAL EXAM

## 2023-07-05 NOTE — DISCUSSION/SUMMARY
[FreeTextEntry1] : Shahnaz is reporting worsening dyspnea, and chest tightness, which has worried her.  Her blood pressure is elevated, though improved on repeat evaluation.  Her EKG continues to demonstrate deep anterior T wave inversions, and V4 through V6.\par \par Given her symptoms, I am going to repeat an echocardiogram, to evaluate for structural heart disease.  She will also be set up for a coronary CTA, to evaluate for obstructive CAD (rather than repeating a stress test, which was abnormal, in 2017).  Her LDL is at goal.\par \par I have stressed the importance of diet, exercise, and weight loss, to reduce her overall cardiovascular risk.  We will speak after the above testing, and arrange follow-up.\par  [EKG obtained to assist in diagnosis and management of assessed problem(s)] : EKG obtained to assist in diagnosis and management of assessed problem(s)

## 2023-07-05 NOTE — HISTORY OF PRESENT ILLNESS
[FreeTextEntry1] : Shahnaz is a 78-year-old female here for follow up evaluation.\par \par She reports seeing a cardiologist about 5 years ago.  She had a stress test which was abnormal, and subsequently a cardiac catheterization, which demonstrated no CAD in 2017.\par \par I last saw her in January 2022 because of an abnormal EKG.  An echocardiogram at this time demonstrated normal LV function, without significant valvular pathology.  Her pulmonary pressures are borderline elevated.\par \par She is generally healthy.  Her past medical history is notable for asthma, and hypertension, that has been generally controlled on medications.\par Since last visit, she is doing okay.  She was outside in the orange fog a few weeks ago, and has since reported more noticeable shortness of breath, chest tightness, and atypical in her throat/chest.\par She is worried about her heart because of all this.  She also thinks there may be an anxiety component.  Her edema, which she noted at last visit, is improved.\par \par She denies toxic habits.  Her past medical history is notable for pulmonary fibrosis in her mother.

## 2023-07-10 ENCOUNTER — APPOINTMENT (OUTPATIENT)
Dept: CARDIOLOGY | Facility: CLINIC | Age: 78
End: 2023-07-10
Payer: MEDICARE

## 2023-07-10 PROCEDURE — 93306 TTE W/DOPPLER COMPLETE: CPT

## 2023-07-25 ENCOUNTER — TRANSCRIPTION ENCOUNTER (OUTPATIENT)
Age: 78
End: 2023-07-25

## 2023-07-25 ENCOUNTER — APPOINTMENT (OUTPATIENT)
Dept: CT IMAGING | Facility: CLINIC | Age: 78
End: 2023-07-25
Payer: MEDICARE

## 2023-07-25 PROCEDURE — 75574 CT ANGIO HRT W/3D IMAGE: CPT

## 2023-08-01 ENCOUNTER — APPOINTMENT (OUTPATIENT)
Dept: CARDIOLOGY | Facility: CLINIC | Age: 78
End: 2023-08-01

## 2023-09-05 ENCOUNTER — RX RENEWAL (OUTPATIENT)
Age: 78
End: 2023-09-05

## 2023-09-07 ENCOUNTER — RX RENEWAL (OUTPATIENT)
Age: 78
End: 2023-09-07

## 2023-09-20 ENCOUNTER — APPOINTMENT (OUTPATIENT)
Dept: INTERNAL MEDICINE | Facility: CLINIC | Age: 78
End: 2023-09-20
Payer: MEDICARE

## 2023-09-20 ENCOUNTER — LABORATORY RESULT (OUTPATIENT)
Age: 78
End: 2023-09-20

## 2023-09-20 ENCOUNTER — NON-APPOINTMENT (OUTPATIENT)
Age: 78
End: 2023-09-20

## 2023-09-20 VITALS
WEIGHT: 216 LBS | SYSTOLIC BLOOD PRESSURE: 126 MMHG | DIASTOLIC BLOOD PRESSURE: 80 MMHG | TEMPERATURE: 98.5 F | HEART RATE: 95 BPM | RESPIRATION RATE: 17 BRPM | BODY MASS INDEX: 36.88 KG/M2 | OXYGEN SATURATION: 98 % | HEIGHT: 64 IN

## 2023-09-20 DIAGNOSIS — R73.9 HYPERGLYCEMIA, UNSPECIFIED: ICD-10-CM

## 2023-09-20 DIAGNOSIS — E55.9 VITAMIN D DEFICIENCY, UNSPECIFIED: ICD-10-CM

## 2023-09-20 DIAGNOSIS — R00.2 PALPITATIONS: ICD-10-CM

## 2023-09-20 DIAGNOSIS — Z79.899 OTHER LONG TERM (CURRENT) DRUG THERAPY: ICD-10-CM

## 2023-09-20 DIAGNOSIS — M79.89 OTHER SPECIFIED SOFT TISSUE DISORDERS: ICD-10-CM

## 2023-09-20 DIAGNOSIS — F41.9 ANXIETY DISORDER, UNSPECIFIED: ICD-10-CM

## 2023-09-20 DIAGNOSIS — E78.1 PURE HYPERGLYCERIDEMIA: ICD-10-CM

## 2023-09-20 PROCEDURE — 93000 ELECTROCARDIOGRAM COMPLETE: CPT | Mod: 59

## 2023-09-20 PROCEDURE — 36415 COLL VENOUS BLD VENIPUNCTURE: CPT

## 2023-09-20 PROCEDURE — 99215 OFFICE O/P EST HI 40 MIN: CPT | Mod: 25

## 2023-09-20 RX ORDER — ALBUTEROL SULFATE 2.5 MG/3ML
(2.5 MG/3ML) SOLUTION RESPIRATORY (INHALATION)
Qty: 30 | Refills: 1 | Status: ACTIVE | COMMUNITY
Start: 2020-03-10 | End: 1900-01-01

## 2023-09-20 RX ORDER — ALPRAZOLAM 0.25 MG/1
0.25 TABLET ORAL
Qty: 30 | Refills: 0 | Status: ACTIVE | COMMUNITY
Start: 2020-09-26 | End: 1900-01-01

## 2023-09-22 LAB
25(OH)D3 SERPL-MCNC: 45.5 NG/ML
ALBUMIN SERPL ELPH-MCNC: 4.2 G/DL
ALP BLD-CCNC: 58 U/L
ALT SERPL-CCNC: 16 U/L
ANION GAP SERPL CALC-SCNC: 11 MMOL/L
AST SERPL-CCNC: 22 U/L
BASOPHILS # BLD AUTO: 0.06 K/UL
BASOPHILS NFR BLD AUTO: 0.5 %
BILIRUB SERPL-MCNC: 0.5 MG/DL
BUN SERPL-MCNC: 26 MG/DL
CALCIUM SERPL-MCNC: 9.8 MG/DL
CHLORIDE SERPL-SCNC: 100 MMOL/L
CHOLEST SERPL-MCNC: 143 MG/DL
CO2 SERPL-SCNC: 28 MMOL/L
CREAT SERPL-MCNC: 1.11 MG/DL
CREAT SPEC-SCNC: 65 MG/DL
EGFR: 51 ML/MIN/1.73M2
EOSINOPHIL # BLD AUTO: 0.26 K/UL
EOSINOPHIL NFR BLD AUTO: 2.1 %
ESTIMATED AVERAGE GLUCOSE: 140 MG/DL
GLUCOSE SERPL-MCNC: 109 MG/DL
HBA1C MFR BLD HPLC: 6.5 %
HCT VFR BLD CALC: 39.7 %
HDLC SERPL-MCNC: 49 MG/DL
HGB BLD-MCNC: 12 G/DL
IMM GRANULOCYTES NFR BLD AUTO: 0.4 %
LDLC SERPL CALC-MCNC: 74 MG/DL
LYMPHOCYTES # BLD AUTO: 3.43 K/UL
LYMPHOCYTES NFR BLD AUTO: 27.9 %
MAN DIFF?: NORMAL
MCHC RBC-ENTMCNC: 28.4 PG
MCHC RBC-ENTMCNC: 30.2 GM/DL
MCV RBC AUTO: 94.1 FL
MICROALBUMIN 24H UR DL<=1MG/L-MCNC: <1.2 MG/DL
MICROALBUMIN/CREAT 24H UR-RTO: NORMAL MG/G
MONOCYTES # BLD AUTO: 0.82 K/UL
MONOCYTES NFR BLD AUTO: 6.7 %
NEUTROPHILS # BLD AUTO: 7.68 K/UL
NEUTROPHILS NFR BLD AUTO: 62.4 %
NONHDLC SERPL-MCNC: 94 MG/DL
PLATELET # BLD AUTO: 354 K/UL
POTASSIUM SERPL-SCNC: 4.5 MMOL/L
PROT SERPL-MCNC: 7.6 G/DL
RBC # BLD: 4.22 M/UL
RBC # FLD: 15.3 %
SODIUM SERPL-SCNC: 138 MMOL/L
T3FREE SERPL-MCNC: 2.64 PG/ML
T4 FREE SERPL-MCNC: 1.3 NG/DL
TRIGL SERPL-MCNC: 111 MG/DL
TSH SERPL-ACNC: 1.32 UIU/ML
WBC # FLD AUTO: 12.3 K/UL

## 2023-10-24 ENCOUNTER — RX RENEWAL (OUTPATIENT)
Age: 78
End: 2023-10-24

## 2023-11-22 ENCOUNTER — RX RENEWAL (OUTPATIENT)
Age: 78
End: 2023-11-22

## 2023-11-27 ENCOUNTER — RX RENEWAL (OUTPATIENT)
Age: 78
End: 2023-11-27

## 2024-01-05 ENCOUNTER — APPOINTMENT (OUTPATIENT)
Dept: CARDIOLOGY | Facility: CLINIC | Age: 79
End: 2024-01-05
Payer: MEDICARE

## 2024-01-05 VITALS
OXYGEN SATURATION: 96 % | HEIGHT: 64 IN | HEART RATE: 79 BPM | DIASTOLIC BLOOD PRESSURE: 90 MMHG | SYSTOLIC BLOOD PRESSURE: 175 MMHG | BODY MASS INDEX: 37.05 KG/M2 | WEIGHT: 217 LBS

## 2024-01-05 PROCEDURE — 93000 ELECTROCARDIOGRAM COMPLETE: CPT

## 2024-01-05 PROCEDURE — 99214 OFFICE O/P EST MOD 30 MIN: CPT

## 2024-01-05 NOTE — HISTORY OF PRESENT ILLNESS
[FreeTextEntry1] : Shahnaz is a 78-year-old female here for follow up evaluation. She is generally healthy.  Her past medical history is notable for asthma, and hypertension, that has been generally controlled on medications.  She reports seeing a cardiologist about 5 years ago.  She had a stress test which was abnormal, and subsequently a cardiac catheterization, which demonstrated no CAD in 2017.  I initially saw her in January 2022 because of an abnormal EKG.  An echocardiogram at this time demonstrated normal LV function, without significant valvular pathology.  Her pulmonary pressures were borderline elevated.  I last saw her in 7/23.  At last visit, she was reporting some atypical chest pain.  Because of this and her abnormal EKG, I set her up for a coronary CTA.  Her calcium score was 340, though there was no significant stenosis.  Today, she is doing okay.  She again is reporting some dyspnea on exertion, and feels that she has to stop many times upon walking because of shortness of breath.  She has no chest pain.  She did have some palpitations a few months ago, though these have resolved.

## 2024-01-05 NOTE — DISCUSSION/SUMMARY
[FreeTextEntry1] : Shahnaz continues to report dyspnea on exertion, which has been more noticeable.  Her EKG is unchanged with deep anterolateral T wave abnormalities.  Fortunately, her recent coronary CTA demonstrated no obstructive CAD.  Her symptoms may be multifactorial in the setting of deconditioning/weight, uncontrolled hypertension, and excessive beta-blockade.  I am decreasing her metoprolol to 100 twice daily, and adding spironolactone 25, which will hopefully help with her edema and blood pressure.  She will also have a repeat echocardiogram.  Will repeat blood work in 2 to 3 weeks.  I have stressed the importance of diet, exercise, and weight loss, to reduce her overall cardiovascular risk.  I will see her again in 4 weeks.  [EKG obtained to assist in diagnosis and management of assessed problem(s)] : EKG obtained to assist in diagnosis and management of assessed problem(s)

## 2024-01-05 NOTE — PHYSICAL EXAM

## 2024-01-16 ENCOUNTER — APPOINTMENT (OUTPATIENT)
Dept: CARDIOLOGY | Facility: CLINIC | Age: 79
End: 2024-01-16

## 2024-01-18 ENCOUNTER — APPOINTMENT (OUTPATIENT)
Dept: CARDIOLOGY | Facility: CLINIC | Age: 79
End: 2024-01-18
Payer: MEDICARE

## 2024-01-18 LAB
BASOPHILS # BLD AUTO: 0.06 K/UL
BASOPHILS NFR BLD AUTO: 0.4 %
EOSINOPHIL # BLD AUTO: 0.2 K/UL
EOSINOPHIL NFR BLD AUTO: 1.5 %
HCT VFR BLD CALC: 37.2 %
HGB BLD-MCNC: 12.4 G/DL
IMM GRANULOCYTES NFR BLD AUTO: 0.7 %
LYMPHOCYTES # BLD AUTO: 3.55 K/UL
LYMPHOCYTES NFR BLD AUTO: 26.1 %
MAN DIFF?: NORMAL
MCHC RBC-ENTMCNC: 29.8 PG
MCHC RBC-ENTMCNC: 33.3 GM/DL
MCV RBC AUTO: 89.4 FL
MONOCYTES # BLD AUTO: 1.14 K/UL
MONOCYTES NFR BLD AUTO: 8.4 %
NEUTROPHILS # BLD AUTO: 8.53 K/UL
NEUTROPHILS NFR BLD AUTO: 62.9 %
PLATELET # BLD AUTO: 358 K/UL
RBC # BLD: 4.16 M/UL
RBC # FLD: 14.5 %
WBC # FLD AUTO: 13.58 K/UL

## 2024-01-18 PROCEDURE — 93306 TTE W/DOPPLER COMPLETE: CPT

## 2024-01-19 LAB
ALBUMIN SERPL ELPH-MCNC: 4.4 G/DL
ALP BLD-CCNC: 61 U/L
ALT SERPL-CCNC: 19 U/L
ANION GAP SERPL CALC-SCNC: 12 MMOL/L
AST SERPL-CCNC: 21 U/L
BILIRUB SERPL-MCNC: 0.6 MG/DL
BUN SERPL-MCNC: 34 MG/DL
CALCIUM SERPL-MCNC: 9.6 MG/DL
CHLORIDE SERPL-SCNC: 100 MMOL/L
CHOLEST SERPL-MCNC: 138 MG/DL
CO2 SERPL-SCNC: 27 MMOL/L
CREAT SERPL-MCNC: 1.26 MG/DL
EGFR: 43 ML/MIN/1.73M2
GLUCOSE SERPL-MCNC: 99 MG/DL
HDLC SERPL-MCNC: 47 MG/DL
LDLC SERPL CALC-MCNC: 68 MG/DL
NONHDLC SERPL-MCNC: 92 MG/DL
NT-PROBNP SERPL-MCNC: 1140 PG/ML
POTASSIUM SERPL-SCNC: 4.4 MMOL/L
PROT SERPL-MCNC: 7.5 G/DL
SODIUM SERPL-SCNC: 139 MMOL/L
TRIGL SERPL-MCNC: 134 MG/DL

## 2024-02-08 ENCOUNTER — APPOINTMENT (OUTPATIENT)
Dept: CARDIOLOGY | Facility: CLINIC | Age: 79
End: 2024-02-08
Payer: MEDICARE

## 2024-02-08 ENCOUNTER — NON-APPOINTMENT (OUTPATIENT)
Age: 79
End: 2024-02-08

## 2024-02-08 VITALS — DIASTOLIC BLOOD PRESSURE: 72 MMHG | SYSTOLIC BLOOD PRESSURE: 132 MMHG

## 2024-02-08 VITALS
BODY MASS INDEX: 36.37 KG/M2 | SYSTOLIC BLOOD PRESSURE: 149 MMHG | HEART RATE: 75 BPM | HEIGHT: 64 IN | OXYGEN SATURATION: 99 % | DIASTOLIC BLOOD PRESSURE: 81 MMHG | WEIGHT: 213 LBS

## 2024-02-08 PROCEDURE — 93000 ELECTROCARDIOGRAM COMPLETE: CPT

## 2024-02-08 PROCEDURE — 99214 OFFICE O/P EST MOD 30 MIN: CPT

## 2024-02-08 NOTE — DISCUSSION/SUMMARY
"Subjective   Dinorah Denis Bond is a 16 y.o. female.     Follow up r/t pelvic pain.       The following portions of the patient's history were reviewed and updated as appropriate: allergies, current medications, past family history, past medical history, past social history, past surgical history and problem list.    /70 (BP Location: Right arm, Patient Position: Sitting, Cuff Size: Adult)   Ht 162.6 cm (64\")   Wt 46.3 kg (102 lb)   LMP 02/23/2020 (Approximate)   BMI 17.51 kg/m²     Review of Systems   Constitutional: Negative for activity change, appetite change, fatigue and fever.   Respiratory: Negative for apnea and shortness of breath.    Cardiovascular: Negative for chest pain and palpitations.   Gastrointestinal: Negative for abdominal distention, abdominal pain, constipation, diarrhea, nausea and vomiting.   Endocrine: Negative for cold intolerance and heat intolerance.   Genitourinary: Negative for difficulty urinating, frequency, menstrual problem, pelvic pain, vaginal discharge and vaginal pain.   Neurological: Negative for headaches.   Psychiatric/Behavioral: Negative for agitation and sleep disturbance.       Objective   Physical Exam   Constitutional: She is oriented to person, place, and time. She appears well-developed.   Eyes: Right eye exhibits no discharge. Left eye exhibits no discharge.   Cardiovascular: Normal rate and regular rhythm.   Pulmonary/Chest: Effort normal and breath sounds normal.   Neurological: She is alert and oriented to person, place, and time.   Skin: Skin is warm.   Psychiatric: She has a normal mood and affect. Her behavior is normal. Judgment and thought content normal.   Vitals reviewed.      Assessment/Plan   Discussed US with pt.   US indicates uterus 6.25 cm, endometrial thickness 0.45 cm, normal ovaries.     Pt reports no further pain.   Pt to continue OCP.   Patient's Body mass index is 17.51 kg/m². BMI is within normal parameters. No follow-up " [FreeTextEntry1] : Shahnaz is doing better, though recently contracted covid.  Her EKG is unchanged with deep anterolateral T wave abnormalities.  Fortunately, her recent coronary CTA demonstrated no obstructive CAD.  Her symptoms have been deemed multifactorial in the setting of deconditioning/weight, uncontrolled hypertension, and excessive beta-blockade.  She is doing better today on a lower dose of metoprolol, and spironolactone 25, which has helped with her edema and blood pressure.  She continues to have normal LV function, with mild eccentric LVH.  I have stressed the importance of diet, exercise, and weight loss, to reduce her overall cardiovascular risk. If no issues, I will see her again in 4 months.   required..    RV 3 months/prn.   Dinorah Barrios was seen today for pelvic pain.    Diagnoses and all orders for this visit:    Pelvic pain    Non-smoker    Body mass index (BMI) less than 19    Other orders  -     norethindrone-ethinyl estradiol FE (MICROGESTIN FE 1/20) 1-20 MG-MCG per tablet; Take 1 tablet by mouth Daily.                 [EKG obtained to assist in diagnosis and management of assessed problem(s)] : EKG obtained to assist in diagnosis and management of assessed problem(s)

## 2024-02-08 NOTE — HISTORY OF PRESENT ILLNESS
[FreeTextEntry1] : Shahnaz is a 78-year-old female here for follow up evaluation. She is generally healthy.  Her past medical history is notable for asthma, and hypertension, that has been generally controlled on medications.  She reports seeing a cardiologist about 5 years ago.  She had a stress test which was abnormal, and subsequently a cardiac catheterization, which demonstrated no CAD in 2017.  I initially saw her in January 2022 because of an abnormal EKG.  An echocardiogram at this time demonstrated normal LV function, without significant valvular pathology.  Her pulmonary pressures were borderline elevated.  I last saw her in 1/24.  At last visit, she was reporting some atypical chest pain.  Because of this and her abnormal EKG, I set her up for a coronary CTA.  Her calcium score was 340, though there was no significant stenosis.  Overall, she is doing better. Her edema improved on spironolactone. Echocardiogram with normal LV function, eccentric LVH. Three days after last visit, she got COVID, and is still recovering from her cough/dyspnea. SBP has been well controlled at home. LDL 68

## 2024-02-08 NOTE — PHYSICAL EXAM

## 2024-02-24 ENCOUNTER — RX RENEWAL (OUTPATIENT)
Age: 79
End: 2024-02-24

## 2024-03-03 ENCOUNTER — RX RENEWAL (OUTPATIENT)
Age: 79
End: 2024-03-03

## 2024-03-21 ENCOUNTER — RX RENEWAL (OUTPATIENT)
Age: 79
End: 2024-03-21

## 2024-04-24 ENCOUNTER — RX RENEWAL (OUTPATIENT)
Age: 79
End: 2024-04-24

## 2024-05-22 ENCOUNTER — RX RENEWAL (OUTPATIENT)
Age: 79
End: 2024-05-22

## 2024-05-22 RX ORDER — ATORVASTATIN CALCIUM 10 MG/1
10 TABLET, FILM COATED ORAL
Qty: 90 | Refills: 1 | Status: ACTIVE | COMMUNITY
Start: 2019-03-25 | End: 1900-01-01

## 2024-06-02 ENCOUNTER — RX RENEWAL (OUTPATIENT)
Age: 79
End: 2024-06-02

## 2024-06-04 ENCOUNTER — APPOINTMENT (OUTPATIENT)
Dept: INTERNAL MEDICINE | Facility: CLINIC | Age: 79
End: 2024-06-04

## 2024-06-11 ENCOUNTER — NON-APPOINTMENT (OUTPATIENT)
Age: 79
End: 2024-06-11

## 2024-06-11 ENCOUNTER — APPOINTMENT (OUTPATIENT)
Dept: CARDIOLOGY | Facility: CLINIC | Age: 79
End: 2024-06-11
Payer: MEDICARE

## 2024-06-11 VITALS
BODY MASS INDEX: 35.68 KG/M2 | DIASTOLIC BLOOD PRESSURE: 83 MMHG | HEART RATE: 72 BPM | HEIGHT: 64 IN | WEIGHT: 209 LBS | OXYGEN SATURATION: 97 % | SYSTOLIC BLOOD PRESSURE: 132 MMHG

## 2024-06-11 VITALS — SYSTOLIC BLOOD PRESSURE: 128 MMHG | DIASTOLIC BLOOD PRESSURE: 78 MMHG

## 2024-06-11 DIAGNOSIS — R94.31 ABNORMAL ELECTROCARDIOGRAM [ECG] [EKG]: ICD-10-CM

## 2024-06-11 DIAGNOSIS — R06.00 DYSPNEA, UNSPECIFIED: ICD-10-CM

## 2024-06-11 PROCEDURE — 99214 OFFICE O/P EST MOD 30 MIN: CPT

## 2024-06-11 PROCEDURE — 93000 ELECTROCARDIOGRAM COMPLETE: CPT

## 2024-06-11 RX ORDER — SPIRONOLACTONE 25 MG/1
25 TABLET ORAL DAILY
Qty: 90 | Refills: 3 | Status: ACTIVE | COMMUNITY
Start: 2024-01-05 | End: 1900-01-01

## 2024-06-11 NOTE — HISTORY OF PRESENT ILLNESS
[FreeTextEntry1] : Shahnaz is a 79-year-old female here for follow up evaluation. She is generally healthy.  Her past medical history is notable for asthma, and hypertension, that has been generally controlled on medications.  She reports seeing a cardiologist about 5 years ago.  She had a stress test which was abnormal, and subsequently a cardiac catheterization, which demonstrated no CAD in 2017.  I initially saw her in January 2022 because of an abnormal EKG.  An echocardiogram at this time demonstrated normal LV function, without significant valvular pathology.  Her pulmonary pressures were borderline elevated.  I last saw her in 2/24. In 2023, she was reporting some atypical chest pain.  Because of this and her abnormal EKG, I set her up for a coronary CTA.  Her calcium score was 340, though there was no significant stenosis.  Overall, she is doing better. Her edema improved on spironolactone. Echocardiogram with normal LV function, eccentric LVH. She is recovering from a diarrheal illness.  SBP has been well controlled at home. Her BB was decreased at last visit, which seems to have helped with her breathing/fatigue. LDL 68

## 2024-06-11 NOTE — DISCUSSION/SUMMARY
[FreeTextEntry1] : Shahnaz is doing better, though recently contracted a diarrheal illness.  Her EKG is unchanged with deep anterolateral T wave abnormalities.  Fortunately, her recent coronary CTA demonstrated no obstructive CAD.  Her symptoms have been deemed multifactorial in the setting of deconditioning/weight, uncontrolled hypertension, and excessive beta-blockade.  She is doing better today on a lower dose of metoprolol (100 bid), and spironolactone 25, which has helped with her edema and blood pressure.  She continues to have normal LV function, with mild eccentric LVH. I would like to get a cardiac MRI at some point in time to evaluate her LVH, though she prefers to hold for now.  I have stressed the importance of diet, exercise, and weight loss, to reduce her overall cardiovascular risk. Her LDL is 68, at goal. If no issues, I will see her again in 4 months.   [EKG obtained to assist in diagnosis and management of assessed problem(s)] : EKG obtained to assist in diagnosis and management of assessed problem(s)

## 2024-06-11 NOTE — PHYSICAL EXAM

## 2024-06-13 ENCOUNTER — RESULT CHARGE (OUTPATIENT)
Age: 79
End: 2024-06-13

## 2024-06-14 ENCOUNTER — APPOINTMENT (OUTPATIENT)
Dept: INTERNAL MEDICINE | Facility: CLINIC | Age: 79
End: 2024-06-14
Payer: MEDICARE

## 2024-06-14 VITALS — DIASTOLIC BLOOD PRESSURE: 78 MMHG | SYSTOLIC BLOOD PRESSURE: 120 MMHG

## 2024-06-14 VITALS
OXYGEN SATURATION: 98 % | SYSTOLIC BLOOD PRESSURE: 148 MMHG | WEIGHT: 210.5 LBS | BODY MASS INDEX: 35.94 KG/M2 | DIASTOLIC BLOOD PRESSURE: 80 MMHG | HEART RATE: 94 BPM | HEIGHT: 64 IN | TEMPERATURE: 97.9 F | RESPIRATION RATE: 17 BRPM

## 2024-06-14 DIAGNOSIS — J45.902 UNSPECIFIED ASTHMA WITH STATUS ASTHMATICUS: ICD-10-CM

## 2024-06-14 DIAGNOSIS — E78.5 HYPERLIPIDEMIA, UNSPECIFIED: ICD-10-CM

## 2024-06-14 DIAGNOSIS — E11.9 TYPE 2 DIABETES MELLITUS W/OUT COMPLICATIONS: ICD-10-CM

## 2024-06-14 DIAGNOSIS — R14.0 ABDOMINAL DISTENSION (GASEOUS): ICD-10-CM

## 2024-06-14 DIAGNOSIS — I25.10 ATHEROSCLEROTIC HEART DISEASE OF NATIVE CORONARY ARTERY W/OUT ANGINA PECTORIS: ICD-10-CM

## 2024-06-14 DIAGNOSIS — I10 ESSENTIAL (PRIMARY) HYPERTENSION: ICD-10-CM

## 2024-06-14 DIAGNOSIS — R19.7 DIARRHEA, UNSPECIFIED: ICD-10-CM

## 2024-06-14 LAB
BILIRUB UR QL STRIP: NORMAL
CLARITY UR: CLEAR
COLLECTION METHOD: NORMAL
GLUCOSE UR-MCNC: NORMAL
HCG UR QL: 0.2 EU/DL
HGB UR QL STRIP.AUTO: NORMAL
KETONES UR-MCNC: NORMAL
LEUKOCYTE ESTERASE UR QL STRIP: NORMAL
NITRITE UR QL STRIP: NORMAL
PH UR STRIP: 5.5
PROT UR STRIP-MCNC: NORMAL
SP GR UR STRIP: 1.03

## 2024-06-14 PROCEDURE — 36415 COLL VENOUS BLD VENIPUNCTURE: CPT

## 2024-06-14 PROCEDURE — 81003 URINALYSIS AUTO W/O SCOPE: CPT | Mod: QW

## 2024-06-14 PROCEDURE — 99215 OFFICE O/P EST HI 40 MIN: CPT

## 2024-06-14 PROCEDURE — G2211 COMPLEX E/M VISIT ADD ON: CPT

## 2024-06-14 RX ORDER — METFORMIN ER 500 MG 500 MG/1
500 TABLET ORAL DAILY
Qty: 90 | Refills: 1 | Status: COMPLETED | COMMUNITY
Start: 2022-03-29 | End: 2024-06-14

## 2024-06-14 RX ORDER — ALBUTEROL SULFATE 90 UG/1
108 (90 BASE) AEROSOL, METERED RESPIRATORY (INHALATION) EVERY 6 HOURS
Qty: 3 | Refills: 1 | Status: COMPLETED | COMMUNITY
End: 2024-06-14

## 2024-06-14 RX ORDER — METOPROLOL TARTRATE 100 MG/1
100 TABLET, FILM COATED ORAL
Qty: 270 | Refills: 1 | Status: DISCONTINUED | COMMUNITY
Start: 2023-11-27 | End: 2024-06-14

## 2024-06-14 RX ORDER — FENOFIBRATE 160 MG/1
160 TABLET ORAL DAILY
Qty: 90 | Refills: 1 | Status: COMPLETED | COMMUNITY
Start: 2019-02-28 | End: 2024-06-14

## 2024-06-14 RX ORDER — METOPROLOL TARTRATE 100 MG/1
100 TABLET, FILM COATED ORAL
Refills: 0 | Status: ACTIVE | COMMUNITY

## 2024-06-14 RX ORDER — BUDESONIDE AND FORMOTEROL FUMARATE DIHYDRATE 160; 4.5 UG/1; UG/1
160-4.5 AEROSOL RESPIRATORY (INHALATION)
Qty: 1 | Refills: 0 | Status: COMPLETED | COMMUNITY
Start: 2019-04-04 | End: 2024-06-14

## 2024-06-14 RX ORDER — BENZONATATE 100 MG/1
100 CAPSULE ORAL
Qty: 30 | Refills: 0 | Status: DISCONTINUED | COMMUNITY
Start: 2024-02-08 | End: 2024-06-14

## 2024-06-14 NOTE — HEALTH RISK ASSESSMENT
[Former] : Former [Patient reported mammogram was normal] : Patient reported mammogram was normal [None] : None [Retired] : retired [Feels Safe at Home] : Feels safe at home [Fully functional (bathing, dressing, toileting, transferring, walking, feeding)] : Fully functional (bathing, dressing, toileting, transferring, walking, feeding) [Fully functional (using the telephone, shopping, preparing meals, housekeeping, doing laundry, using] : Fully functional and needs no help or supervision to perform IADLs (using the telephone, shopping, preparing meals, housekeeping, doing laundry, using transportation, managing medications and managing finances) [Seat Belt] :  uses seat belt [Independent] : managing finances [No] : In the past 12 months have you used drugs other than those required for medical reasons? No [No falls in past year] : Patient reported no falls in the past year [MammogramDate] : 04/24 [MammogramComments] : birad 1 [BoneDensityComments] : rx given today 06/14 [Audit-CScore] : 0

## 2024-06-14 NOTE — ASSESSMENT
[FreeTextEntry1] : Ms. FULTON is a 79 year female, with a past medical history as noted above, who present to the office today for Follow-up status post cardiology consult.  Updated medication management.  Renewal of inhaler.  Repeat abnormal labs.  Abdominal bloating.

## 2024-06-14 NOTE — PLAN
[FreeTextEntry1] : Pulmonary asthma - continue Symbicort (Budesonide Formoterol Fumarate) 160-4.5 MCG/ACT, 2 puffs BID q.d. as directed; continue ProAir HFA (Albuterol Sulfate) 108 MCG/ACT p.r.n. as directed, Levalbuterol Tartrate 45 MCG/ACT p.r.n. as directed,  Cardiovascular  bilateral lower extremity edema/venous insufficiency -Has improved on spironolactone.  Continue with spironolactone as well as hydrochlorothiazide. - check CMP - continue keeping legs elevated; advised low sodium diet and weight loss benign essential hypertension - continue Valsartan 320mg p.o.q.d. as directed, Metoprolol Tartrate 100mg bid as directed, and HCTZ 25mg p.o.q.d. as directed - check CMP -  advised low sodium diet and weight loss; will continue to monitor BP hyperlipidemia - continue Atorvastatin Calcium 10mg p.o.q.d. as directed - advised low cholesterol/low fat diet - check FLP and LFTs hypertriglyceridemia - continue Fenofibrate 160mg p.o.q.d. as directed - advised low cholesterol/low fat diet and low carbohydrate/low sugar diet - check FLP  palpitations - Reviewed and discussed EKG, advised to avoid caffeine and to follow up with cardiologist, Dr. Shepard check TFTs Endocrinology DM - discussed Metformin ER in length - advised low carbohydrate/low sugar diet; recommend gradually increasing CV exercise when cleared by cardiology -  continue f/u with ophthalmologist Dr. Britt for annual diabetic eye exams- check hemoglobin A1C. history of Vitamin D insufficiency - continue Vitamin D-3 2000 IU TID p.o.q.d. with food as directed - check Vitamin D   Rheumatology-screening for osteoporosis-counseled given to the patient about the importance of screening for osteoporosis and treatment of osteoporosis.  Advised patient for go for a bone density scan.  Referral was provided  Nephrology renal insufficiency - check CMP, on ARB to help prevent DM nephropathy, Consider Farxiga which might help with cardiovascular disease as well as renal insufficiency and prediabetesWill discuss further pending labs.  Gastroenterology GERD/esophageal reflux - continue antireflux measures Status post gastroenteritis virus-advised patient discontinue Pepto-Bismol.  Advised patient to start Gas-X.  Prescription given for stool samples.  Check abdominal sonogram.  Follow-up with gastroenterology if no improvement advise diet.  Psychiatry anxiety - continue Alprazolam 0.25mg p.o. p.r.n. as directed; previously discussed starting every-day anxiolytic medication if anxiety worsens .  insomnia - previously discussed starting Trazodone, declined   I spent 43 Minutes with the patient, half of which we discussed finding on physical exam and coordinated care.  As well as reviewed my plans and follow ups. Dragon speech recognition software was used to create portions of this document.  An attempt at proofreading has been made to minimize errors please call if any questions arise.

## 2024-06-14 NOTE — REVIEW OF SYSTEMS
[Palpitations] : palpitations [Shortness Of Breath] : shortness of breath [Dyspnea on Exertion] : dyspnea on exertion [Negative] : Heme/Lymph [Fatigue] : fatigue [Fever] : no fever [Chills] : no chills [Discharge] : no discharge [Vision Problems] : no vision problems [Earache] : no earache [Hearing Loss] : no hearing loss [Nosebleed] : no nosebleeds [Nasal Discharge] : no nasal discharge [Sore Throat] : no sore throat [Postnasal Drip] : no postnasal drip [Chest Pain] : no chest pain [Lower Ext Edema] : no lower extremity edema [Wheezing] : no wheezing [Cough] : no cough [Nausea] : no nausea [Heartburn] : no heartburn [Melena] : no melena [Dysuria] : no dysuria [Incontinence] : no incontinence [Hematuria] : no hematuria [Frequency] : no frequency [Joint Pain] : no joint pain [Muscle Pain] : no muscle pain [Itching] : no itching [Mole Changes] : no mole changes [Skin Rash] : no skin rash [Headache] : no headache [Dizziness] : no dizziness [Fainting] : no fainting [Insomnia] : no insomnia [Anxiety] : no anxiety [Depression] : no depression [Easy Bleeding] : no easy bleeding [Easy Bruising] : no easy bruising [Swollen Glands] : no swollen glands [FreeTextEntry5] : Slight edema lower extremity but much improved since on spironolactone [FreeTextEntry7] : Abdominal cramping.

## 2024-06-14 NOTE — PHYSICAL EXAM
[No Acute Distress] : no acute distress [Well Nourished] : well nourished [Well Developed] : well developed [Well-Appearing] : well-appearing [Normal Sclera/Conjunctiva] : normal sclera/conjunctiva [PERRL] : pupils equal round and reactive to light [EOMI] : extraocular movements intact [Normal Outer Ear/Nose] : the outer ears and nose were normal in appearance [Normal Oropharynx] : the oropharynx was normal [No JVD] : no jugular venous distention [No Lymphadenopathy] : no lymphadenopathy [Supple] : supple [Thyroid Normal, No Nodules] : the thyroid was normal and there were no nodules present [No Respiratory Distress] : no respiratory distress  [No Accessory Muscle Use] : no accessory muscle use [Clear to Auscultation] : lungs were clear to auscultation bilaterally [Normal Rate] : normal rate  [Regular Rhythm] : with a regular rhythm [Normal S1, S2] : normal S1 and S2 [No Carotid Bruits] : no carotid bruits [No Abdominal Bruit] : a ~M bruit was not heard ~T in the abdomen [Pedal Pulses Present] : the pedal pulses are present [No Palpable Aorta] : no palpable aorta [No Extremity Clubbing/Cyanosis] : no extremity clubbing/cyanosis [___+] : [unfilled]U+ pretibial pitting edema on the left [Rt] : varicose veins of the right leg noted [Lt] : varicose veins of the left leg noted [Soft] : abdomen soft [Non Tender] : non-tender [Non-distended] : non-distended [No Masses] : no abdominal mass palpated [No HSM] : no HSM [Normal Bowel Sounds] : normal bowel sounds [Normal Posterior Cervical Nodes] : no posterior cervical lymphadenopathy [Normal Anterior Cervical Nodes] : no anterior cervical lymphadenopathy [No CVA Tenderness] : no CVA  tenderness [No Spinal Tenderness] : no spinal tenderness [No Joint Swelling] : no joint swelling [Grossly Normal Strength/Tone] : grossly normal strength/tone [No Rash] : no rash [Coordination Grossly Intact] : coordination grossly intact [No Focal Deficits] : no focal deficits [Normal Gait] : normal gait [Deep Tendon Reflexes (DTR)] : deep tendon reflexes were 2+ and symmetric [Normal Affect] : the affect was normal [Normal Insight/Judgement] : insight and judgment were intact [Normal TMs] : both tympanic membranes were normal [Speech Grossly Normal] : speech grossly normal [Alert and Oriented x3] : oriented to person, place, and time [Normal Mood] : the mood was normal [de-identified] : With murmur [de-identified] : Trace edema left lower extremity [de-identified] : Hyperactive bowel sounds [de-identified] : As per gynecology

## 2024-06-14 NOTE — HISTORY OF PRESENT ILLNESS
[FreeTextEntry1] : Medication renewal fasting labs, and abdominal cramping.   [de-identified] : Mrs. FULTON is an 79 year- female, with a past medical history as noted below, who present to the office today for medication renewal and fasting labs. Also, for evaluation of abdominal cramping.  Patient states she recently saw cardiology and was very upset that she was on 300 mg of metoprolol stated it was 2 months.  Was discontinue metoprolol 300 mg placed back on 200 mg and added spironolactone.  Since taking spironolactone leg swelling has improved. Patient states last week she believes she had a norovirus.  States she had abdominal discomfort nausea, vomited once, then followed up with loose stools for few days.  States she started taking Pepto-Bismol which seemed to help.  Still feels abdominal cramping states bowel movement this morning was solid. Even though the bowel movements seem to be normalizing still do not feel right.

## 2024-06-14 NOTE — COUNSELING
[Encouraged to increase physical activity] : Encouraged to increase physical activity [Good understanding] : Patient has a good understanding of disease, goals and obesity follow-up plan [FreeTextEntry2] : Low-fat low-cholesterol diet.  Low-sodium as well as low glycemic diet

## 2024-06-14 NOTE — DATA REVIEWED
[FreeTextEntry1] : Reviewed blood pressure readings. Reviewed blood work done in January 2024 by cardiology.  Which included CBC, proBNP, comprehensive metabolic, A1c, lipid panel. Reviewed CBC from 2022 and 2023 with persistent elevation of white blood cell count.  Reviewed recent mammogram which BI-RADS 1. See scanned for full detail.

## 2024-06-16 ENCOUNTER — RX RENEWAL (OUTPATIENT)
Age: 79
End: 2024-06-16

## 2024-06-17 ENCOUNTER — RX RENEWAL (OUTPATIENT)
Age: 79
End: 2024-06-17

## 2024-06-17 RX ORDER — VALSARTAN 320 MG/1
320 TABLET, COATED ORAL DAILY
Qty: 90 | Refills: 0 | Status: ACTIVE | COMMUNITY
Start: 2019-08-02 | End: 1900-01-01

## 2024-06-17 RX ORDER — FLUTICASONE FUROATE AND VILANTEROL TRIFENATATE 100; 25 UG/1; UG/1
100-25 POWDER RESPIRATORY (INHALATION)
Qty: 1 | Refills: 2 | Status: ACTIVE | COMMUNITY
Start: 2024-06-17 | End: 1900-01-01

## 2024-06-17 RX ORDER — METOPROLOL TARTRATE 100 MG/1
100 TABLET, FILM COATED ORAL
Qty: 180 | Refills: 0 | Status: ACTIVE | COMMUNITY
Start: 2019-05-03 | End: 1900-01-01

## 2024-06-17 RX ORDER — FENOFIBRATE 160 MG/1
160 TABLET ORAL
Qty: 90 | Refills: 0 | Status: ACTIVE | COMMUNITY
Start: 2023-11-27 | End: 1900-01-01

## 2024-06-17 RX ORDER — BUDESONIDE AND FORMOTEROL FUMARATE DIHYDRATE 160; 4.5 UG/1; UG/1
160-4.5 AEROSOL RESPIRATORY (INHALATION)
Qty: 3 | Refills: 0 | Status: DISCONTINUED | COMMUNITY
Start: 2021-05-03 | End: 2024-06-17

## 2024-06-17 RX ORDER — HYDROCHLOROTHIAZIDE 25 MG/1
25 TABLET ORAL
Qty: 90 | Refills: 0 | Status: ACTIVE | COMMUNITY
Start: 2019-04-05 | End: 1900-01-01

## 2024-06-18 DIAGNOSIS — N28.9 DISORDER OF KIDNEY AND URETER, UNSPECIFIED: ICD-10-CM

## 2024-06-18 DIAGNOSIS — D72.829 ELEVATED WHITE BLOOD CELL COUNT, UNSPECIFIED: ICD-10-CM

## 2024-06-18 LAB
ALBUMIN SERPL ELPH-MCNC: 4.2 G/DL
ALP BLD-CCNC: 47 U/L
ALT SERPL-CCNC: 18 U/L
ANION GAP SERPL CALC-SCNC: 13 MMOL/L
AST SERPL-CCNC: 19 U/L
BASOPHILS # BLD AUTO: 0.07 K/UL
BASOPHILS NFR BLD AUTO: 0.4 %
BILIRUB SERPL-MCNC: 0.3 MG/DL
BUN SERPL-MCNC: 44 MG/DL
CALCIUM SERPL-MCNC: 9.6 MG/DL
CHLORIDE SERPL-SCNC: 101 MMOL/L
CHOLEST SERPL-MCNC: 137 MG/DL
CO2 SERPL-SCNC: 23 MMOL/L
CREAT SERPL-MCNC: 1.69 MG/DL
EGFR: 30 ML/MIN/1.73M2
EOSINOPHIL # BLD AUTO: 0.18 K/UL
EOSINOPHIL NFR BLD AUTO: 1.1 %
ESTIMATED AVERAGE GLUCOSE: 137 MG/DL
GLUCOSE SERPL-MCNC: 124 MG/DL
HBA1C MFR BLD HPLC: 6.4 %
HCT VFR BLD CALC: 35.3 %
HDLC SERPL-MCNC: 40 MG/DL
HGB BLD-MCNC: 11.2 G/DL
IMM GRANULOCYTES NFR BLD AUTO: 0.9 %
LDLC SERPL CALC-MCNC: 74 MG/DL
LYMPHOCYTES # BLD AUTO: 3.31 K/UL
LYMPHOCYTES NFR BLD AUTO: 21.1 %
MAN DIFF?: NORMAL
MCHC RBC-ENTMCNC: 30.2 PG
MCHC RBC-ENTMCNC: 31.7 GM/DL
MCV RBC AUTO: 95.1 FL
MONOCYTES # BLD AUTO: 1.18 K/UL
MONOCYTES NFR BLD AUTO: 7.5 %
NEUTROPHILS # BLD AUTO: 10.82 K/UL
NEUTROPHILS NFR BLD AUTO: 69 %
NONHDLC SERPL-MCNC: 97 MG/DL
PLATELET # BLD AUTO: 446 K/UL
POTASSIUM SERPL-SCNC: 4.9 MMOL/L
PROT SERPL-MCNC: 7.4 G/DL
RBC # BLD: 3.71 M/UL
RBC # FLD: 13.6 %
SODIUM SERPL-SCNC: 136 MMOL/L
TRIGL SERPL-MCNC: 128 MG/DL
TSH SERPL-ACNC: 1.13 UIU/ML
WBC # FLD AUTO: 15.7 K/UL

## 2024-06-29 ENCOUNTER — APPOINTMENT (OUTPATIENT)
Dept: ULTRASOUND IMAGING | Facility: CLINIC | Age: 79
End: 2024-06-29

## 2024-06-29 ENCOUNTER — APPOINTMENT (OUTPATIENT)
Dept: RADIOLOGY | Facility: CLINIC | Age: 79
End: 2024-06-29

## 2024-07-08 ENCOUNTER — OUTPATIENT (OUTPATIENT)
Dept: OUTPATIENT SERVICES | Facility: HOSPITAL | Age: 79
LOS: 1 days | End: 2024-07-08
Payer: MEDICARE

## 2024-07-08 ENCOUNTER — APPOINTMENT (OUTPATIENT)
Dept: ULTRASOUND IMAGING | Facility: CLINIC | Age: 79
End: 2024-07-08
Payer: MEDICARE

## 2024-07-08 DIAGNOSIS — Z00.8 ENCOUNTER FOR OTHER GENERAL EXAMINATION: ICD-10-CM

## 2024-07-08 PROCEDURE — 76775 US EXAM ABDO BACK WALL LIM: CPT

## 2024-07-08 PROCEDURE — 76775 US EXAM ABDO BACK WALL LIM: CPT | Mod: 26

## 2024-07-10 ENCOUNTER — EMERGENCY (EMERGENCY)
Facility: HOSPITAL | Age: 79
LOS: 1 days | Discharge: HOME | End: 2024-07-10
Attending: STUDENT IN AN ORGANIZED HEALTH CARE EDUCATION/TRAINING PROGRAM | Admitting: EMERGENCY MEDICINE
Payer: MEDICARE

## 2024-07-10 VITALS
HEART RATE: 98 BPM | TEMPERATURE: 99 F | WEIGHT: 203.93 LBS | RESPIRATION RATE: 16 BRPM | DIASTOLIC BLOOD PRESSURE: 90 MMHG | HEIGHT: 63.5 IN | SYSTOLIC BLOOD PRESSURE: 148 MMHG | OXYGEN SATURATION: 98 %

## 2024-07-10 VITALS
HEART RATE: 92 BPM | SYSTOLIC BLOOD PRESSURE: 123 MMHG | TEMPERATURE: 98 F | OXYGEN SATURATION: 95 % | RESPIRATION RATE: 18 BRPM | DIASTOLIC BLOOD PRESSURE: 78 MMHG

## 2024-07-10 LAB
ALBUMIN SERPL ELPH-MCNC: 3.7 G/DL — SIGNIFICANT CHANGE UP (ref 3.3–5)
ALP SERPL-CCNC: 50 U/L — SIGNIFICANT CHANGE UP (ref 40–120)
ALT FLD-CCNC: 22 U/L — SIGNIFICANT CHANGE UP (ref 12–78)
ANION GAP SERPL CALC-SCNC: 7 MMOL/L — SIGNIFICANT CHANGE UP (ref 5–17)
APPEARANCE UR: CLEAR — SIGNIFICANT CHANGE UP
APTT BLD: 25.9 SEC — SIGNIFICANT CHANGE UP (ref 24.5–35.6)
AST SERPL-CCNC: 24 U/L — SIGNIFICANT CHANGE UP (ref 15–37)
BASOPHILS # BLD AUTO: 0.05 K/UL — SIGNIFICANT CHANGE UP (ref 0–0.2)
BASOPHILS NFR BLD AUTO: 0.4 % — SIGNIFICANT CHANGE UP (ref 0–2)
BILIRUB SERPL-MCNC: 0.3 MG/DL — SIGNIFICANT CHANGE UP (ref 0.2–1.2)
BILIRUB UR-MCNC: NEGATIVE — SIGNIFICANT CHANGE UP
BUN SERPL-MCNC: 38 MG/DL — HIGH (ref 7–23)
CALCIUM SERPL-MCNC: 9.8 MG/DL — SIGNIFICANT CHANGE UP (ref 8.5–10.1)
CHLORIDE SERPL-SCNC: 99 MMOL/L — SIGNIFICANT CHANGE UP (ref 96–108)
CO2 SERPL-SCNC: 29 MMOL/L — SIGNIFICANT CHANGE UP (ref 22–31)
COLOR SPEC: YELLOW — SIGNIFICANT CHANGE UP
CREAT SERPL-MCNC: 1.8 MG/DL — HIGH (ref 0.5–1.3)
DIFF PNL FLD: NEGATIVE — SIGNIFICANT CHANGE UP
EGFR: 28 ML/MIN/1.73M2 — LOW
EGFR: 28 ML/MIN/1.73M2 — LOW
EOSINOPHIL # BLD AUTO: 0.14 K/UL — SIGNIFICANT CHANGE UP (ref 0–0.5)
EOSINOPHIL NFR BLD AUTO: 1 % — SIGNIFICANT CHANGE UP (ref 0–6)
EPI CELLS # UR: PRESENT
FLUAV AG NPH QL: SIGNIFICANT CHANGE UP
FLUBV AG NPH QL: SIGNIFICANT CHANGE UP
GLUCOSE SERPL-MCNC: 151 MG/DL — HIGH (ref 70–99)
GLUCOSE UR QL: NEGATIVE MG/DL — SIGNIFICANT CHANGE UP
HCT VFR BLD CALC: 36.1 % — SIGNIFICANT CHANGE UP (ref 34.5–45)
HGB BLD-MCNC: 12.1 G/DL — SIGNIFICANT CHANGE UP (ref 11.5–15.5)
IMM GRANULOCYTES NFR BLD AUTO: 0.7 % — SIGNIFICANT CHANGE UP (ref 0–0.9)
INR BLD: 0.96 RATIO — SIGNIFICANT CHANGE UP (ref 0.85–1.18)
KETONES UR-MCNC: NEGATIVE MG/DL — SIGNIFICANT CHANGE UP
LEUKOCYTE ESTERASE UR-ACNC: ABNORMAL
LYMPHOCYTES # BLD AUTO: 24.3 % — SIGNIFICANT CHANGE UP (ref 13–44)
LYMPHOCYTES # BLD AUTO: 3.3 K/UL — SIGNIFICANT CHANGE UP (ref 1–3.3)
MAGNESIUM SERPL-MCNC: 2.1 MG/DL — SIGNIFICANT CHANGE UP (ref 1.6–2.6)
MCHC RBC-ENTMCNC: 29.7 PG — SIGNIFICANT CHANGE UP (ref 27–34)
MCHC RBC-ENTMCNC: 33.5 GM/DL — SIGNIFICANT CHANGE UP (ref 32–36)
MCV RBC AUTO: 88.7 FL — SIGNIFICANT CHANGE UP (ref 80–100)
MONOCYTES # BLD AUTO: 0.86 K/UL — SIGNIFICANT CHANGE UP (ref 0–0.9)
MONOCYTES NFR BLD AUTO: 6.3 % — SIGNIFICANT CHANGE UP (ref 2–14)
NEUTROPHILS # BLD AUTO: 9.13 K/UL — HIGH (ref 1.8–7.4)
NEUTROPHILS NFR BLD AUTO: 67.3 % — SIGNIFICANT CHANGE UP (ref 43–77)
NITRITE UR-MCNC: NEGATIVE — SIGNIFICANT CHANGE UP
NRBC # BLD: 0 /100 WBCS — SIGNIFICANT CHANGE UP (ref 0–0)
NRBC BLD-RTO: 0 /100 WBCS — SIGNIFICANT CHANGE UP (ref 0–0)
PH UR: 6 — SIGNIFICANT CHANGE UP (ref 5–8)
PLATELET # BLD AUTO: 404 K/UL — HIGH (ref 150–400)
POTASSIUM SERPL-MCNC: 4.1 MMOL/L — SIGNIFICANT CHANGE UP (ref 3.5–5.3)
POTASSIUM SERPL-SCNC: 4.1 MMOL/L — SIGNIFICANT CHANGE UP (ref 3.5–5.3)
PROT SERPL-MCNC: 8.3 G/DL — SIGNIFICANT CHANGE UP (ref 6–8.3)
PROT UR-MCNC: NEGATIVE MG/DL — SIGNIFICANT CHANGE UP
PROTHROM AB SERPL-ACNC: 11.3 SEC — SIGNIFICANT CHANGE UP (ref 9.5–13)
RBC # BLD: 4.07 M/UL — SIGNIFICANT CHANGE UP (ref 3.8–5.2)
RBC # FLD: 13.2 % — SIGNIFICANT CHANGE UP (ref 10.3–14.5)
RBC CASTS # UR COMP ASSIST: 0 /HPF — SIGNIFICANT CHANGE UP (ref 0–4)
RSV RNA NPH QL NAA+NON-PROBE: SIGNIFICANT CHANGE UP
SARS-COV-2 RNA SPEC QL NAA+PROBE: SIGNIFICANT CHANGE UP
SODIUM SERPL-SCNC: 135 MMOL/L — SIGNIFICANT CHANGE UP (ref 135–145)
SP GR SPEC: 1.01 — SIGNIFICANT CHANGE UP (ref 1–1.03)
TROPONIN I, HIGH SENSITIVITY RESULT: 13.3 NG/L — SIGNIFICANT CHANGE UP
UROBILINOGEN FLD QL: 0.2 MG/DL — SIGNIFICANT CHANGE UP (ref 0.2–1)
WBC # BLD: 13.57 K/UL — HIGH (ref 3.8–10.5)
WBC # FLD AUTO: 13.57 K/UL — HIGH (ref 3.8–10.5)
WBC UR QL: 2 /HPF — SIGNIFICANT CHANGE UP (ref 0–5)

## 2024-07-10 PROCEDURE — 85730 THROMBOPLASTIN TIME PARTIAL: CPT

## 2024-07-10 PROCEDURE — 71045 X-RAY EXAM CHEST 1 VIEW: CPT

## 2024-07-10 PROCEDURE — 85025 COMPLETE CBC W/AUTO DIFF WBC: CPT

## 2024-07-10 PROCEDURE — 93005 ELECTROCARDIOGRAM TRACING: CPT

## 2024-07-10 PROCEDURE — 99285 EMERGENCY DEPT VISIT HI MDM: CPT | Mod: 25

## 2024-07-10 PROCEDURE — 84484 ASSAY OF TROPONIN QUANT: CPT

## 2024-07-10 PROCEDURE — 93010 ELECTROCARDIOGRAM REPORT: CPT

## 2024-07-10 PROCEDURE — 83735 ASSAY OF MAGNESIUM: CPT

## 2024-07-10 PROCEDURE — 71045 X-RAY EXAM CHEST 1 VIEW: CPT | Mod: 26

## 2024-07-10 PROCEDURE — 99285 EMERGENCY DEPT VISIT HI MDM: CPT

## 2024-07-10 PROCEDURE — 85610 PROTHROMBIN TIME: CPT

## 2024-07-10 PROCEDURE — 80053 COMPREHEN METABOLIC PANEL: CPT

## 2024-07-10 PROCEDURE — 36415 COLL VENOUS BLD VENIPUNCTURE: CPT

## 2024-07-10 PROCEDURE — 87637 SARSCOV2&INF A&B&RSV AMP PRB: CPT

## 2024-07-10 PROCEDURE — 81001 URINALYSIS AUTO W/SCOPE: CPT

## 2024-07-10 RX ORDER — ALPRAZOLAM 0.5 MG
0.25 TABLET, EXTENDED RELEASE 24 HR ORAL ONCE
Refills: 0 | Status: DISCONTINUED | OUTPATIENT
Start: 2024-07-10 | End: 2024-07-10

## 2024-07-10 RX ADMIN — Medication 0.25 MILLIGRAM(S): at 18:26

## 2024-07-10 RX ADMIN — Medication 1000 MILLILITER(S): at 17:19

## 2024-07-15 LAB
ALBUMIN SERPL ELPH-MCNC: 3.9 G/DL
ALP BLD-CCNC: 46 U/L
ALT SERPL-CCNC: 11 U/L
AST SERPL-CCNC: 17 U/L
BUN SERPL-MCNC: 30 MG/DL
CALCIUM SERPL-MCNC: 9.5 MG/DL
CHLORIDE SERPL-SCNC: 103 MMOL/L
CO2 SERPL-SCNC: 24 MMOL/L
CREAT SERPL-MCNC: 1.38 MG/DL
EGFR: 39 ML/MIN/1.73M2
GLUCOSE SERPL-MCNC: 133 MG/DL
POTASSIUM SERPL-SCNC: 4.4 MMOL/L
PROT SERPL-MCNC: 6.7 G/DL
SODIUM SERPL-SCNC: 138 MMOL/L

## 2024-07-16 ENCOUNTER — APPOINTMENT (OUTPATIENT)
Dept: CARDIOLOGY | Facility: CLINIC | Age: 79
End: 2024-07-16
Payer: MEDICARE

## 2024-07-16 ENCOUNTER — NON-APPOINTMENT (OUTPATIENT)
Age: 79
End: 2024-07-16

## 2024-07-16 VITALS
BODY MASS INDEX: 35.34 KG/M2 | HEART RATE: 84 BPM | WEIGHT: 207 LBS | OXYGEN SATURATION: 97 % | SYSTOLIC BLOOD PRESSURE: 148 MMHG | HEIGHT: 64 IN | DIASTOLIC BLOOD PRESSURE: 85 MMHG

## 2024-07-16 DIAGNOSIS — R94.31 ABNORMAL ELECTROCARDIOGRAM [ECG] [EKG]: ICD-10-CM

## 2024-07-16 DIAGNOSIS — I10 ESSENTIAL (PRIMARY) HYPERTENSION: ICD-10-CM

## 2024-07-16 PROCEDURE — 93000 ELECTROCARDIOGRAM COMPLETE: CPT

## 2024-07-16 PROCEDURE — 99214 OFFICE O/P EST MOD 30 MIN: CPT

## 2024-09-09 ENCOUNTER — RX RENEWAL (OUTPATIENT)
Age: 79
End: 2024-09-09

## 2024-09-11 ENCOUNTER — RX RENEWAL (OUTPATIENT)
Age: 79
End: 2024-09-11

## 2024-09-13 ENCOUNTER — RX RENEWAL (OUTPATIENT)
Age: 79
End: 2024-09-13

## 2024-09-16 RX ORDER — FUROSEMIDE 20 MG/1
20 TABLET ORAL
Qty: 30 | Refills: 0 | Status: ACTIVE | COMMUNITY
Start: 2024-09-16

## 2024-09-21 ENCOUNTER — RX RENEWAL (OUTPATIENT)
Age: 79
End: 2024-09-21

## 2024-09-23 ENCOUNTER — RX RENEWAL (OUTPATIENT)
Age: 79
End: 2024-09-23

## 2024-09-30 ENCOUNTER — NON-APPOINTMENT (OUTPATIENT)
Age: 79
End: 2024-09-30

## 2024-10-03 ENCOUNTER — APPOINTMENT (OUTPATIENT)
Facility: CLINIC | Age: 79
End: 2024-10-03

## 2024-10-03 VITALS
DIASTOLIC BLOOD PRESSURE: 85 MMHG | HEIGHT: 64 IN | SYSTOLIC BLOOD PRESSURE: 157 MMHG | OXYGEN SATURATION: 98 % | TEMPERATURE: 98.5 F | RESPIRATION RATE: 18 BRPM | BODY MASS INDEX: 36.7 KG/M2 | WEIGHT: 215 LBS | HEART RATE: 84 BPM

## 2024-10-03 DIAGNOSIS — D64.9 ANEMIA, UNSPECIFIED: ICD-10-CM

## 2024-10-03 DIAGNOSIS — I50.9 HEART FAILURE, UNSPECIFIED: ICD-10-CM

## 2024-10-03 DIAGNOSIS — J45.902 UNSPECIFIED ASTHMA WITH STATUS ASTHMATICUS: ICD-10-CM

## 2024-10-03 DIAGNOSIS — R06.00 DYSPNEA, UNSPECIFIED: ICD-10-CM

## 2024-10-03 DIAGNOSIS — E11.9 TYPE 2 DIABETES MELLITUS W/OUT COMPLICATIONS: ICD-10-CM

## 2024-10-03 PROCEDURE — 99204 OFFICE O/P NEW MOD 45 MIN: CPT | Mod: 25

## 2024-10-03 PROCEDURE — 94729 DIFFUSING CAPACITY: CPT

## 2024-10-03 PROCEDURE — 94060 EVALUATION OF WHEEZING: CPT

## 2024-10-03 PROCEDURE — 94727 GAS DIL/WSHOT DETER LNG VOL: CPT

## 2024-10-03 PROCEDURE — 99214 OFFICE O/P EST MOD 30 MIN: CPT | Mod: 25

## 2024-10-03 RX ORDER — FLUTICASONE FUROATE, UMECLIDINIUM BROMIDE AND VILANTEROL TRIFENATATE 200; 62.5; 25 UG/1; UG/1; UG/1
200-62.5-25 POWDER RESPIRATORY (INHALATION) DAILY
Qty: 1 | Refills: 5 | Status: ACTIVE | COMMUNITY
Start: 2024-10-03 | End: 1900-01-01

## 2024-10-03 NOTE — HISTORY OF PRESENT ILLNESS
[TextBox_4] : 79-year-old female with a history of asthma.  She has significant exertional dyspnea.  She is not very active.  She claims that the Breo and the albuterol do not help she also has a nebulizer.  She had been on Lasix for fluid retention but has stopped it due to a cough.  She does have wheezing and cough at times.  She is sleeping well on 1 pillow.

## 2024-10-03 NOTE — DISCUSSION/SUMMARY
[FreeTextEntry1] : This patient has dyspnea primarily exertional which is likely multifactorial.  She has asthma with some wheezing however PFT was normal.  She may have an element of congestive heart failure.  She is certainly deconditioned and overweight.  She also has some mild anemia.  Will order chest x-ray and also check BNP and IgE.  Will switch to Trelegy 201 puff daily.  Would consider tezspire injections.  Respiratory muscle  advised.  Lose weight.  I will see her back here in 6 weeks.

## 2024-10-03 NOTE — PHYSICAL EXAM
[No Acute Distress] : no acute distress [Normal Oropharynx] : normal oropharynx [Normal Appearance] : normal appearance [No Neck Mass] : no neck mass [Normal Rate/Rhythm] : normal rate/rhythm [Normal S1, S2] : normal s1, s2 [No Murmurs] : no murmurs [No Resp Distress] : no resp distress [Wheeze] : wheeze [No Abnormalities] : no abnormalities [Benign] : benign [No Clubbing] : no clubbing [No Cyanosis] : no cyanosis [No Edema] : no edema [FROM] : FROM [Normal Color/ Pigmentation] : normal color/ pigmentation [Normal Affect] : normal affect [TextBox_2] : overweight female

## 2024-10-03 NOTE — CONSULT LETTER
[Dear  ___] : Dear  [unfilled], [Consult Letter:] : I had the pleasure of evaluating your patient, [unfilled]. [Please see my note below.] : Please see my note below. [Consult Closing:] : Thank you very much for allowing me to participate in the care of this patient.  If you have any questions, please do not hesitate to contact me. [Sincerely,] : Sincerely, [FreeTextEntry2] : Nacho Eduardo [FreeTextEntry3] :  Stas Arreola MD FACP FCCP [DrMarcelle  ___] : Dr. VÁSQUEZ

## 2024-10-22 ENCOUNTER — RESULT REVIEW (OUTPATIENT)
Age: 79
End: 2024-10-22

## 2024-10-22 ENCOUNTER — LABORATORY RESULT (OUTPATIENT)
Age: 79
End: 2024-10-22

## 2024-10-22 ENCOUNTER — OUTPATIENT (OUTPATIENT)
Dept: OUTPATIENT SERVICES | Facility: HOSPITAL | Age: 79
LOS: 1 days | End: 2024-10-22
Payer: MEDICARE

## 2024-10-22 ENCOUNTER — APPOINTMENT (OUTPATIENT)
Dept: RADIOLOGY | Facility: CLINIC | Age: 79
End: 2024-10-22

## 2024-10-22 DIAGNOSIS — I50.9 HEART FAILURE, UNSPECIFIED: ICD-10-CM

## 2024-10-22 PROCEDURE — 71046 X-RAY EXAM CHEST 2 VIEWS: CPT | Mod: 26

## 2024-10-23 ENCOUNTER — RESULT CHARGE (OUTPATIENT)
Age: 79
End: 2024-10-23

## 2024-10-23 ENCOUNTER — APPOINTMENT (OUTPATIENT)
Dept: INTERNAL MEDICINE | Facility: CLINIC | Age: 79
End: 2024-10-23
Payer: MEDICARE

## 2024-10-23 VITALS
HEART RATE: 82 BPM | DIASTOLIC BLOOD PRESSURE: 84 MMHG | BODY MASS INDEX: 36.88 KG/M2 | SYSTOLIC BLOOD PRESSURE: 138 MMHG | WEIGHT: 216 LBS | TEMPERATURE: 98.1 F | HEIGHT: 64 IN | OXYGEN SATURATION: 97 %

## 2024-10-23 DIAGNOSIS — R30.0 DYSURIA: ICD-10-CM

## 2024-10-23 DIAGNOSIS — R82.998 OTHER ABNORMAL FINDINGS IN URINE: ICD-10-CM

## 2024-10-23 DIAGNOSIS — N28.9 DISORDER OF KIDNEY AND URETER, UNSPECIFIED: ICD-10-CM

## 2024-10-23 DIAGNOSIS — E11.9 TYPE 2 DIABETES MELLITUS W/OUT COMPLICATIONS: ICD-10-CM

## 2024-10-23 PROCEDURE — 99214 OFFICE O/P EST MOD 30 MIN: CPT

## 2024-10-23 PROCEDURE — 81003 URINALYSIS AUTO W/O SCOPE: CPT | Mod: QW

## 2024-10-23 PROCEDURE — 36415 COLL VENOUS BLD VENIPUNCTURE: CPT

## 2024-10-23 PROCEDURE — G2211 COMPLEX E/M VISIT ADD ON: CPT

## 2024-10-25 LAB
ALBUMIN SERPL ELPH-MCNC: 4.1 G/DL
ALP BLD-CCNC: 59 U/L
ALT SERPL-CCNC: 17 U/L
ANION GAP SERPL CALC-SCNC: 14 MMOL/L
AST SERPL-CCNC: 20 U/L
BASOPHILS # BLD AUTO: 0.06 K/UL
BASOPHILS NFR BLD AUTO: 0.5 %
BILIRUB SERPL-MCNC: 0.4 MG/DL
BUN SERPL-MCNC: 34 MG/DL
CALCIUM SERPL-MCNC: 9.8 MG/DL
CHLORIDE SERPL-SCNC: 100 MMOL/L
CO2 SERPL-SCNC: 24 MMOL/L
CREAT SERPL-MCNC: 1.39 MG/DL
EGFR: 38 ML/MIN/1.73M2
EOSINOPHIL # BLD AUTO: 0.23 K/UL
EOSINOPHIL NFR BLD AUTO: 2 %
ESTIMATED AVERAGE GLUCOSE: 143 MG/DL
GLUCOSE SERPL-MCNC: 134 MG/DL
HBA1C MFR BLD HPLC: 6.6 %
HCT VFR BLD CALC: 35.2 %
HGB BLD-MCNC: 11.1 G/DL
IMM GRANULOCYTES NFR BLD AUTO: 0.4 %
LYMPHOCYTES # BLD AUTO: 2.69 K/UL
LYMPHOCYTES NFR BLD AUTO: 23.4 %
MAN DIFF?: NORMAL
MCHC RBC-ENTMCNC: 28.5 PG
MCHC RBC-ENTMCNC: 31.5 GM/DL
MCV RBC AUTO: 90.5 FL
MONOCYTES # BLD AUTO: 0.65 K/UL
MONOCYTES NFR BLD AUTO: 5.6 %
NEUTROPHILS # BLD AUTO: 7.84 K/UL
NEUTROPHILS NFR BLD AUTO: 68.1 %
PLATELET # BLD AUTO: 416 K/UL
POTASSIUM SERPL-SCNC: 4.3 MMOL/L
PROT SERPL-MCNC: 7.7 G/DL
RBC # BLD: 3.89 M/UL
RBC # FLD: 13.9 %
SODIUM SERPL-SCNC: 138 MMOL/L
WBC # FLD AUTO: 11.52 K/UL

## 2024-10-28 DIAGNOSIS — R82.81 PYURIA: ICD-10-CM

## 2024-10-28 LAB — BACTERIA UR CULT: ABNORMAL

## 2024-10-28 RX ORDER — AMPICILLIN 500 MG/1
500 CAPSULE ORAL 4 TIMES DAILY
Qty: 28 | Refills: 0 | Status: ACTIVE | COMMUNITY
Start: 2024-10-28 | End: 1900-01-01

## 2024-10-29 ENCOUNTER — INPATIENT (INPATIENT)
Facility: HOSPITAL | Age: 79
LOS: 6 days | Discharge: HOSPICE | DRG: 392 | End: 2024-11-05
Attending: SURGERY | Admitting: SURGERY
Payer: MEDICARE

## 2024-10-29 VITALS
RESPIRATION RATE: 18 BRPM | HEART RATE: 122 BPM | WEIGHT: 207.01 LBS | SYSTOLIC BLOOD PRESSURE: 171 MMHG | TEMPERATURE: 101 F | DIASTOLIC BLOOD PRESSURE: 84 MMHG | OXYGEN SATURATION: 96 % | HEIGHT: 64 IN

## 2024-10-29 DIAGNOSIS — K57.20 DIVERTICULITIS OF LARGE INTESTINE WITH PERFORATION AND ABSCESS WITHOUT BLEEDING: ICD-10-CM

## 2024-10-29 LAB
ALBUMIN SERPL ELPH-MCNC: 3.2 G/DL — LOW (ref 3.3–5)
ALP SERPL-CCNC: 51 U/L — SIGNIFICANT CHANGE UP (ref 40–120)
ALT FLD-CCNC: 25 U/L — SIGNIFICANT CHANGE UP (ref 12–78)
ANION GAP SERPL CALC-SCNC: 9 MMOL/L — SIGNIFICANT CHANGE UP (ref 5–17)
APPEARANCE UR: CLEAR — SIGNIFICANT CHANGE UP
APTT BLD: 29.9 SEC — SIGNIFICANT CHANGE UP (ref 24.5–35.6)
AST SERPL-CCNC: 21 U/L — SIGNIFICANT CHANGE UP (ref 15–37)
BASOPHILS # BLD AUTO: 0.02 K/UL — SIGNIFICANT CHANGE UP (ref 0–0.2)
BASOPHILS NFR BLD AUTO: 0.1 % — SIGNIFICANT CHANGE UP (ref 0–2)
BILIRUB SERPL-MCNC: 1 MG/DL — SIGNIFICANT CHANGE UP (ref 0.2–1.2)
BILIRUB UR-MCNC: NEGATIVE — SIGNIFICANT CHANGE UP
BUN SERPL-MCNC: 26 MG/DL — HIGH (ref 7–23)
CALCIUM SERPL-MCNC: 9 MG/DL — SIGNIFICANT CHANGE UP (ref 8.5–10.1)
CHLORIDE SERPL-SCNC: 101 MMOL/L — SIGNIFICANT CHANGE UP (ref 96–108)
CO2 SERPL-SCNC: 25 MMOL/L — SIGNIFICANT CHANGE UP (ref 22–31)
COLOR SPEC: YELLOW — SIGNIFICANT CHANGE UP
CREAT SERPL-MCNC: 1.3 MG/DL — SIGNIFICANT CHANGE UP (ref 0.5–1.3)
DIFF PNL FLD: NEGATIVE — SIGNIFICANT CHANGE UP
EGFR: 42 ML/MIN/1.73M2 — LOW
EOSINOPHIL # BLD AUTO: 0 K/UL — SIGNIFICANT CHANGE UP (ref 0–0.5)
EOSINOPHIL NFR BLD AUTO: 0 % — SIGNIFICANT CHANGE UP (ref 0–6)
GLUCOSE SERPL-MCNC: 142 MG/DL — HIGH (ref 70–99)
GLUCOSE UR QL: NEGATIVE MG/DL — SIGNIFICANT CHANGE UP
HCT VFR BLD CALC: 34.1 % — LOW (ref 34.5–45)
HGB BLD-MCNC: 11.1 G/DL — LOW (ref 11.5–15.5)
IMM GRANULOCYTES NFR BLD AUTO: 0.7 % — SIGNIFICANT CHANGE UP (ref 0–0.9)
INR BLD: 1.14 RATIO — SIGNIFICANT CHANGE UP (ref 0.85–1.16)
KETONES UR-MCNC: NEGATIVE MG/DL — SIGNIFICANT CHANGE UP
LACTATE SERPL-SCNC: 1.1 MMOL/L — SIGNIFICANT CHANGE UP (ref 0.7–2)
LEUKOCYTE ESTERASE UR-ACNC: NEGATIVE — SIGNIFICANT CHANGE UP
LYMPHOCYTES # BLD AUTO: 1.75 K/UL — SIGNIFICANT CHANGE UP (ref 1–3.3)
LYMPHOCYTES # BLD AUTO: 8.3 % — LOW (ref 13–44)
MCHC RBC-ENTMCNC: 28.3 PG — SIGNIFICANT CHANGE UP (ref 27–34)
MCHC RBC-ENTMCNC: 32.6 G/DL — SIGNIFICANT CHANGE UP (ref 32–36)
MCV RBC AUTO: 87 FL — SIGNIFICANT CHANGE UP (ref 80–100)
MONOCYTES # BLD AUTO: 1.04 K/UL — HIGH (ref 0–0.9)
MONOCYTES NFR BLD AUTO: 4.9 % — SIGNIFICANT CHANGE UP (ref 2–14)
NEUTROPHILS # BLD AUTO: 18.17 K/UL — HIGH (ref 1.8–7.4)
NEUTROPHILS NFR BLD AUTO: 86 % — HIGH (ref 43–77)
NITRITE UR-MCNC: NEGATIVE — SIGNIFICANT CHANGE UP
NRBC # BLD: 0 /100 WBCS — SIGNIFICANT CHANGE UP (ref 0–0)
PH UR: 5.5 — SIGNIFICANT CHANGE UP (ref 5–8)
PLATELET # BLD AUTO: 368 K/UL — SIGNIFICANT CHANGE UP (ref 150–400)
POTASSIUM SERPL-MCNC: 3.7 MMOL/L — SIGNIFICANT CHANGE UP (ref 3.5–5.3)
POTASSIUM SERPL-SCNC: 3.7 MMOL/L — SIGNIFICANT CHANGE UP (ref 3.5–5.3)
PROT SERPL-MCNC: 8 G/DL — SIGNIFICANT CHANGE UP (ref 6–8.3)
PROT UR-MCNC: SIGNIFICANT CHANGE UP MG/DL
PROTHROM AB SERPL-ACNC: 13.3 SEC — SIGNIFICANT CHANGE UP (ref 9.9–13.4)
RBC # BLD: 3.92 M/UL — SIGNIFICANT CHANGE UP (ref 3.8–5.2)
RBC # FLD: 13.8 % — SIGNIFICANT CHANGE UP (ref 10.3–14.5)
SODIUM SERPL-SCNC: 135 MMOL/L — SIGNIFICANT CHANGE UP (ref 135–145)
SP GR SPEC: 1.02 — SIGNIFICANT CHANGE UP (ref 1–1.03)
UROBILINOGEN FLD QL: 0.2 MG/DL — SIGNIFICANT CHANGE UP (ref 0.2–1)
WBC # BLD: 21.12 K/UL — HIGH (ref 3.8–10.5)
WBC # FLD AUTO: 21.12 K/UL — HIGH (ref 3.8–10.5)

## 2024-10-29 PROCEDURE — 74177 CT ABD & PELVIS W/CONTRAST: CPT | Mod: 26,MC

## 2024-10-29 PROCEDURE — 99291 CRITICAL CARE FIRST HOUR: CPT

## 2024-10-29 PROCEDURE — 71045 X-RAY EXAM CHEST 1 VIEW: CPT | Mod: 26

## 2024-10-29 PROCEDURE — 93010 ELECTROCARDIOGRAM REPORT: CPT

## 2024-10-29 RX ORDER — PIPERACILLIN AND TAZOBACTAM .5; 4 G/20ML; G/20ML
3.38 INJECTION, POWDER, LYOPHILIZED, FOR SOLUTION INTRAVENOUS ONCE
Refills: 0 | Status: COMPLETED | OUTPATIENT
Start: 2024-10-29 | End: 2024-10-29

## 2024-10-29 RX ORDER — MORPHINE SULFATE 30 MG/1
4 TABLET, EXTENDED RELEASE ORAL ONCE
Refills: 0 | Status: DISCONTINUED | OUTPATIENT
Start: 2024-10-29 | End: 2024-10-29

## 2024-10-29 RX ORDER — PIPERACILLIN AND TAZOBACTAM .5; 4 G/20ML; G/20ML
3.38 INJECTION, POWDER, LYOPHILIZED, FOR SOLUTION INTRAVENOUS EVERY 8 HOURS
Refills: 0 | Status: DISCONTINUED | OUTPATIENT
Start: 2024-10-29 | End: 2024-10-30

## 2024-10-29 RX ORDER — KETOROLAC TROMETHAMINE 30 MG/ML
15 INJECTION INTRAMUSCULAR; INTRAVENOUS ONCE
Refills: 0 | Status: DISCONTINUED | OUTPATIENT
Start: 2024-10-29 | End: 2024-10-30

## 2024-10-29 RX ORDER — SODIUM CHLORIDE 9 MG/ML
1700 INJECTION, SOLUTION INTRAMUSCULAR; INTRAVENOUS; SUBCUTANEOUS ONCE
Refills: 0 | Status: COMPLETED | OUTPATIENT
Start: 2024-10-29 | End: 2024-10-29

## 2024-10-29 RX ORDER — ACETAMINOPHEN 500 MG
1000 TABLET ORAL ONCE
Refills: 0 | Status: COMPLETED | OUTPATIENT
Start: 2024-10-29 | End: 2024-10-29

## 2024-10-29 RX ADMIN — SODIUM CHLORIDE 1700 MILLILITER(S): 9 INJECTION, SOLUTION INTRAMUSCULAR; INTRAVENOUS; SUBCUTANEOUS at 20:32

## 2024-10-29 RX ADMIN — PIPERACILLIN AND TAZOBACTAM 3.38 GRAM(S): .5; 4 INJECTION, POWDER, LYOPHILIZED, FOR SOLUTION INTRAVENOUS at 19:28

## 2024-10-29 RX ADMIN — MORPHINE SULFATE 4 MILLIGRAM(S): 30 TABLET, EXTENDED RELEASE ORAL at 23:56

## 2024-10-29 RX ADMIN — Medication 400 MILLIGRAM(S): at 18:46

## 2024-10-29 RX ADMIN — Medication 1000 MILLIGRAM(S): at 19:07

## 2024-10-29 RX ADMIN — Medication 1000 MILLIGRAM(S): at 19:42

## 2024-10-29 RX ADMIN — SODIUM CHLORIDE 1700 MILLILITER(S): 9 INJECTION, SOLUTION INTRAMUSCULAR; INTRAVENOUS; SUBCUTANEOUS at 18:46

## 2024-10-29 RX ADMIN — PIPERACILLIN AND TAZOBACTAM 200 GRAM(S): .5; 4 INJECTION, POWDER, LYOPHILIZED, FOR SOLUTION INTRAVENOUS at 18:46

## 2024-10-29 NOTE — ED PROVIDER NOTE - CLINICAL SUMMARY MEDICAL DECISION MAKING FREE TEXT BOX
I, Sudeep Ortega MD, have seen and examined the patient on the date of service.  I agree with the GLEN's assessment as written, with exceptions or additions as noted below or in a separate note.  Patient with a past medical history of hypertension, hyperlipidemia is presenting for abdominal pain x 1 week with developing fever today.  Was recently told she had a UTI and was started on ampicillin however her symptoms have been getting worse and now she is having fever so she was sent to ED for evaluation.  On exam she has lower abdominal tenderness to palpation.  She is febrile and tachycardic here.  Patient with concern for appendicitis versus diverticulitis versus pyelonephritis.  Will plan on lab work, CT scan, sepsis pathway with antibiotics and reassessment.

## 2024-10-29 NOTE — ED ADULT NURSE NOTE - OBJECTIVE STATEMENT
Pt received in bed alert and oriented and resting in bed with the c/o RLQ abd pain and fever. Pt states that she has been experiencing this x 1 week. As per MD;s orders IV abdelrahman placed blood specimen obtained and sent to the lab. Pt stable and nursing care ongoing and safety maintained

## 2024-10-29 NOTE — ED ADULT TRIAGE NOTE - CHIEF COMPLAINT QUOTE
pt states that she was sent in by her PCP to r/o appendicitis. pt reports fevers, RLQ abd pain and nausea

## 2024-10-29 NOTE — ED PROVIDER NOTE - CRITICAL CARE ATTENDING CONTRIBUTION TO CARE
shared visit with ed pa. Upon my evaluation, this patient has a high probability of imminent or life-threatening deterioration which required my direct attention, intervention and personal management.  I have personally provided the amount of critical care time documented above excluding time spent on separate procedures. Elements for critical care include direct patient care (not related to procedure), additional history taking, interpretation of diagnostic studies, documentation, consultation with other physicians.

## 2024-10-29 NOTE — ED PROVIDER NOTE - PHYSICAL EXAMINATION
Gen: Well appearing in NAD.   Eyes: PERRLA  Head: atraumatic  Heart: s1/s2, RRR  Lung: CTA b/l, no wheezing/rhonchi or rales.   Abd: soft, ND,  + RLQ TTP no rebound or guarding, NCVAT  Msk: no pedal edema   Neuro: AAO x3, patient moving all extremity equally, no focal neuro deficits noted  Skin: Normal for race.   Psych: Calm and cooperative

## 2024-10-29 NOTE — ED PROVIDER NOTE - OBJECTIVE STATEMENT
79-year-old female past medical history hypertension, high cholesterol presents to the ED with complaints of lower abdominal pain x 1 week localized to the right lower quadrant today with fever of 102 today.  Patient saw her PCP last week who did labs and urine, he called her yesterday that she had a UTI I started her on ampicillin however when her symptoms worsened today he is now concerned for appendicitis and sent her to the ED for evaluation.  Patient denies any nausea vomiting diarrhea, prior abdominal surgeries, chest pain, shortness of breath or all other complaints

## 2024-10-30 ENCOUNTER — RESULT REVIEW (OUTPATIENT)
Age: 79
End: 2024-10-30

## 2024-10-30 ENCOUNTER — TRANSCRIPTION ENCOUNTER (OUTPATIENT)
Age: 79
End: 2024-10-30

## 2024-10-30 DIAGNOSIS — K57.92 DIVERTICULITIS OF INTESTINE, PART UNSPECIFIED, WITHOUT PERFORATION OR ABSCESS WITHOUT BLEEDING: ICD-10-CM

## 2024-10-30 DIAGNOSIS — E78.5 HYPERLIPIDEMIA, UNSPECIFIED: ICD-10-CM

## 2024-10-30 DIAGNOSIS — R60.0 LOCALIZED EDEMA: ICD-10-CM

## 2024-10-30 DIAGNOSIS — J45.909 UNSPECIFIED ASTHMA, UNCOMPLICATED: ICD-10-CM

## 2024-10-30 DIAGNOSIS — I10 ESSENTIAL (PRIMARY) HYPERTENSION: ICD-10-CM

## 2024-10-30 DIAGNOSIS — Z29.9 ENCOUNTER FOR PROPHYLACTIC MEASURES, UNSPECIFIED: ICD-10-CM

## 2024-10-30 DIAGNOSIS — R73.03 PREDIABETES: ICD-10-CM

## 2024-10-30 LAB
A1C WITH ESTIMATED AVERAGE GLUCOSE RESULT: 6.5 % — HIGH (ref 4–5.6)
ABO RH CONFIRMATION: SIGNIFICANT CHANGE UP
ANION GAP SERPL CALC-SCNC: 10 MMOL/L — SIGNIFICANT CHANGE UP (ref 5–17)
ANION GAP SERPL CALC-SCNC: 8 MMOL/L — SIGNIFICANT CHANGE UP (ref 5–17)
APTT BLD: 31.9 SEC — SIGNIFICANT CHANGE UP (ref 24.5–35.6)
BASE EXCESS BLDA CALC-SCNC: -5 MMOL/L — LOW (ref -2–3)
BASOPHILS # BLD AUTO: 0.05 K/UL — SIGNIFICANT CHANGE UP (ref 0–0.2)
BASOPHILS NFR BLD AUTO: 0.2 % — SIGNIFICANT CHANGE UP (ref 0–2)
BLOOD GAS COMMENTS ARTERIAL: SIGNIFICANT CHANGE UP
BUN SERPL-MCNC: 22 MG/DL — SIGNIFICANT CHANGE UP (ref 7–23)
BUN SERPL-MCNC: 23 MG/DL — SIGNIFICANT CHANGE UP (ref 7–23)
CALCIUM SERPL-MCNC: 7.9 MG/DL — LOW (ref 8.5–10.1)
CALCIUM SERPL-MCNC: 8.5 MG/DL — SIGNIFICANT CHANGE UP (ref 8.5–10.1)
CHLORIDE SERPL-SCNC: 104 MMOL/L — SIGNIFICANT CHANGE UP (ref 96–108)
CHLORIDE SERPL-SCNC: 105 MMOL/L — SIGNIFICANT CHANGE UP (ref 96–108)
CO2 SERPL-SCNC: 22 MMOL/L — SIGNIFICANT CHANGE UP (ref 22–31)
CO2 SERPL-SCNC: 25 MMOL/L — SIGNIFICANT CHANGE UP (ref 22–31)
CREAT SERPL-MCNC: 1.3 MG/DL — SIGNIFICANT CHANGE UP (ref 0.5–1.3)
CREAT SERPL-MCNC: 1.3 MG/DL — SIGNIFICANT CHANGE UP (ref 0.5–1.3)
EGFR: 42 ML/MIN/1.73M2 — LOW
EGFR: 42 ML/MIN/1.73M2 — LOW
EOSINOPHIL # BLD AUTO: 0.03 K/UL — SIGNIFICANT CHANGE UP (ref 0–0.5)
EOSINOPHIL NFR BLD AUTO: 0.1 % — SIGNIFICANT CHANGE UP (ref 0–6)
ESTIMATED AVERAGE GLUCOSE: 140 MG/DL — HIGH (ref 68–114)
GAS PNL BLDA: SIGNIFICANT CHANGE UP
GLUCOSE BLDC GLUCOMTR-MCNC: 146 MG/DL — HIGH (ref 70–99)
GLUCOSE BLDC GLUCOMTR-MCNC: 154 MG/DL — HIGH (ref 70–99)
GLUCOSE SERPL-MCNC: 136 MG/DL — HIGH (ref 70–99)
GLUCOSE SERPL-MCNC: 152 MG/DL — HIGH (ref 70–99)
GRAM STN FLD: SIGNIFICANT CHANGE UP
HCO3 BLDA-SCNC: 20 MMOL/L — LOW (ref 21–28)
HCT VFR BLD CALC: 26.6 % — LOW (ref 34.5–45)
HCT VFR BLD CALC: 29.7 % — LOW (ref 34.5–45)
HGB BLD-MCNC: 8.6 G/DL — LOW (ref 11.5–15.5)
HGB BLD-MCNC: 9.6 G/DL — LOW (ref 11.5–15.5)
IMM GRANULOCYTES NFR BLD AUTO: 0.9 % — SIGNIFICANT CHANGE UP (ref 0–0.9)
INR BLD: 1.21 RATIO — HIGH (ref 0.85–1.16)
LACTATE SERPL-SCNC: 1.2 MMOL/L — SIGNIFICANT CHANGE UP (ref 0.7–2)
LYMPHOCYTES # BLD AUTO: 11.7 % — LOW (ref 13–44)
LYMPHOCYTES # BLD AUTO: 2.94 K/UL — SIGNIFICANT CHANGE UP (ref 1–3.3)
MAGNESIUM SERPL-MCNC: 1.8 MG/DL — SIGNIFICANT CHANGE UP (ref 1.6–2.6)
MCHC RBC-ENTMCNC: 28.4 PG — SIGNIFICANT CHANGE UP (ref 27–34)
MCHC RBC-ENTMCNC: 28.4 PG — SIGNIFICANT CHANGE UP (ref 27–34)
MCHC RBC-ENTMCNC: 32.3 G/DL — SIGNIFICANT CHANGE UP (ref 32–36)
MCHC RBC-ENTMCNC: 32.3 G/DL — SIGNIFICANT CHANGE UP (ref 32–36)
MCV RBC AUTO: 87.8 FL — SIGNIFICANT CHANGE UP (ref 80–100)
MCV RBC AUTO: 87.9 FL — SIGNIFICANT CHANGE UP (ref 80–100)
MONOCYTES # BLD AUTO: 1.64 K/UL — HIGH (ref 0–0.9)
MONOCYTES NFR BLD AUTO: 6.5 % — SIGNIFICANT CHANGE UP (ref 2–14)
NEUTROPHILS # BLD AUTO: 20.25 K/UL — HIGH (ref 1.8–7.4)
NEUTROPHILS NFR BLD AUTO: 80.6 % — HIGH (ref 43–77)
NRBC # BLD: 0 /100 WBCS — SIGNIFICANT CHANGE UP (ref 0–0)
NRBC # BLD: 0 /100 WBCS — SIGNIFICANT CHANGE UP (ref 0–0)
PCO2 BLDA: 32 MMHG — SIGNIFICANT CHANGE UP (ref 32–35)
PH BLDA: 7.4 — SIGNIFICANT CHANGE UP (ref 7.35–7.45)
PHOSPHATE SERPL-MCNC: 2.5 MG/DL — SIGNIFICANT CHANGE UP (ref 2.5–4.5)
PLATELET # BLD AUTO: 296 K/UL — SIGNIFICANT CHANGE UP (ref 150–400)
PLATELET # BLD AUTO: 329 K/UL — SIGNIFICANT CHANGE UP (ref 150–400)
PO2 BLDA: 281 MMHG — HIGH (ref 83–108)
POTASSIUM SERPL-MCNC: 3.5 MMOL/L — SIGNIFICANT CHANGE UP (ref 3.5–5.3)
POTASSIUM SERPL-MCNC: 3.6 MMOL/L — SIGNIFICANT CHANGE UP (ref 3.5–5.3)
POTASSIUM SERPL-SCNC: 3.5 MMOL/L — SIGNIFICANT CHANGE UP (ref 3.5–5.3)
POTASSIUM SERPL-SCNC: 3.6 MMOL/L — SIGNIFICANT CHANGE UP (ref 3.5–5.3)
PROTHROM AB SERPL-ACNC: 14.3 SEC — HIGH (ref 9.9–13.4)
RBC # BLD: 3.03 M/UL — LOW (ref 3.8–5.2)
RBC # BLD: 3.38 M/UL — LOW (ref 3.8–5.2)
RBC # FLD: 13.9 % — SIGNIFICANT CHANGE UP (ref 10.3–14.5)
RBC # FLD: 14.1 % — SIGNIFICANT CHANGE UP (ref 10.3–14.5)
SAO2 % BLDA: 100 % — HIGH (ref 94–98)
SODIUM SERPL-SCNC: 137 MMOL/L — SIGNIFICANT CHANGE UP (ref 135–145)
SODIUM SERPL-SCNC: 137 MMOL/L — SIGNIFICANT CHANGE UP (ref 135–145)
SPECIMEN SOURCE: SIGNIFICANT CHANGE UP
WBC # BLD: 20.62 K/UL — HIGH (ref 3.8–10.5)
WBC # BLD: 25.13 K/UL — HIGH (ref 3.8–10.5)
WBC # FLD AUTO: 20.62 K/UL — HIGH (ref 3.8–10.5)
WBC # FLD AUTO: 25.13 K/UL — HIGH (ref 3.8–10.5)

## 2024-10-30 PROCEDURE — 99221 1ST HOSP IP/OBS SF/LOW 40: CPT

## 2024-10-30 PROCEDURE — 99233 SBSQ HOSP IP/OBS HIGH 50: CPT

## 2024-10-30 PROCEDURE — 71045 X-RAY EXAM CHEST 1 VIEW: CPT | Mod: 26

## 2024-10-30 PROCEDURE — 99222 1ST HOSP IP/OBS MODERATE 55: CPT

## 2024-10-30 PROCEDURE — 93306 TTE W/DOPPLER COMPLETE: CPT | Mod: 26

## 2024-10-30 PROCEDURE — 88307 TISSUE EXAM BY PATHOLOGIST: CPT | Mod: 26

## 2024-10-30 DEVICE — STAPLER COVIDIEN ENDO GIA 80-3.8MM BLUE: Type: IMPLANTABLE DEVICE | Status: FUNCTIONAL

## 2024-10-30 DEVICE — STAPLER COVIDIEN TA 60 BLUE: Type: IMPLANTABLE DEVICE | Status: FUNCTIONAL

## 2024-10-30 DEVICE — SEALANT VISTASEAL FIBRIN HUMAN 10ML 12/KIT: Type: IMPLANTABLE DEVICE | Status: FUNCTIONAL

## 2024-10-30 DEVICE — STAPLER CONTOUR CURVED WITH BLUE CART: Type: IMPLANTABLE DEVICE | Status: FUNCTIONAL

## 2024-10-30 DEVICE — STAPLER ECHELON CONTOUR RELOAD GST 6/BX BLU: Type: IMPLANTABLE DEVICE | Status: FUNCTIONAL

## 2024-10-30 DEVICE — STAPLER ECHELON CONTOUR CURVED CUTTER 40MM WITH (GREEN) RELOAD: Type: IMPLANTABLE DEVICE | Status: FUNCTIONAL

## 2024-10-30 DEVICE — STAPLER COVIDIEN EEA CIRCULAR DST 28MM 3.5MM BLUE: Type: IMPLANTABLE DEVICE | Status: FUNCTIONAL

## 2024-10-30 DEVICE — STAPLER COVIDIEN ENDO GIA 80-3.8MM BLUE RELOAD: Type: IMPLANTABLE DEVICE | Status: FUNCTIONAL

## 2024-10-30 RX ORDER — GLUCAGON INJECTION, SOLUTION 1 MG/.2ML
1 INJECTION, SOLUTION SUBCUTANEOUS ONCE
Refills: 0 | Status: DISCONTINUED | OUTPATIENT
Start: 2024-10-30 | End: 2024-10-30

## 2024-10-30 RX ORDER — ONDANSETRON HYDROCHLORIDE 2 MG/ML
4 INJECTION, SOLUTION INTRAMUSCULAR; INTRAVENOUS ONCE
Refills: 0 | Status: COMPLETED | OUTPATIENT
Start: 2024-10-30 | End: 2024-10-30

## 2024-10-30 RX ORDER — INFLUENZ VIR VAC TV P-SURF2003 15MCG/.5ML
0.5 SYRINGE (ML) INTRAMUSCULAR ONCE
Refills: 0 | Status: DISCONTINUED | OUTPATIENT
Start: 2024-10-30 | End: 2024-11-05

## 2024-10-30 RX ORDER — BENZOCAINE 200 MG/G
1 GEL ORAL EVERY 4 HOURS
Refills: 0 | Status: DISCONTINUED | OUTPATIENT
Start: 2024-10-30 | End: 2024-11-05

## 2024-10-30 RX ORDER — HYDROCHLOROTHIAZIDE 50 MG
1 TABLET ORAL
Refills: 0 | DISCHARGE

## 2024-10-30 RX ORDER — PANTOPRAZOLE SODIUM 40 MG/1
40 TABLET, DELAYED RELEASE ORAL DAILY
Refills: 0 | Status: DISCONTINUED | OUTPATIENT
Start: 2024-10-30 | End: 2024-11-01

## 2024-10-30 RX ORDER — ALBUTEROL 90 MCG
1 AEROSOL (GRAM) INHALATION EVERY 8 HOURS
Refills: 0 | Status: DISCONTINUED | OUTPATIENT
Start: 2024-10-30 | End: 2024-10-30

## 2024-10-30 RX ORDER — ALBUMIN HUMAN 50 G/1000ML
250 SOLUTION INTRAVENOUS
Refills: 0 | Status: COMPLETED | OUTPATIENT
Start: 2024-10-30 | End: 2024-10-30

## 2024-10-30 RX ORDER — ACETAMINOPHEN 500 MG
1000 TABLET ORAL EVERY 6 HOURS
Refills: 0 | Status: COMPLETED | OUTPATIENT
Start: 2024-10-30 | End: 2024-10-31

## 2024-10-30 RX ORDER — PIPERACILLIN AND TAZOBACTAM .5; 4 G/20ML; G/20ML
3.38 INJECTION, POWDER, LYOPHILIZED, FOR SOLUTION INTRAVENOUS EVERY 8 HOURS
Refills: 0 | Status: DISCONTINUED | OUTPATIENT
Start: 2024-10-30 | End: 2024-11-05

## 2024-10-30 RX ORDER — HYDROMORPHONE HCL/0.9% NACL/PF 6 MG/30 ML
0.5 PATIENT CONTROLLED ANALGESIA SYRINGE INTRAVENOUS EVERY 6 HOURS
Refills: 0 | Status: DISCONTINUED | OUTPATIENT
Start: 2024-10-30 | End: 2024-10-31

## 2024-10-30 RX ORDER — ONDANSETRON HYDROCHLORIDE 2 MG/ML
4 INJECTION, SOLUTION INTRAMUSCULAR; INTRAVENOUS EVERY 8 HOURS
Refills: 0 | Status: DISCONTINUED | OUTPATIENT
Start: 2024-10-30 | End: 2024-10-30

## 2024-10-30 RX ORDER — FENOFIBRATE 145 MG/1
1 TABLET, FILM COATED ORAL
Refills: 0 | DISCHARGE

## 2024-10-30 RX ORDER — HYDROMORPHONE HCL/0.9% NACL/PF 6 MG/30 ML
0.5 PATIENT CONTROLLED ANALGESIA SYRINGE INTRAVENOUS
Refills: 0 | Status: DISCONTINUED | OUTPATIENT
Start: 2024-10-30 | End: 2024-10-30

## 2024-10-30 RX ORDER — ACETAMINOPHEN 500 MG
1000 TABLET ORAL ONCE
Refills: 0 | Status: COMPLETED | OUTPATIENT
Start: 2024-10-30 | End: 2024-10-30

## 2024-10-30 RX ORDER — VALSARTAN 160 MG/1
1 TABLET ORAL
Refills: 0 | DISCHARGE

## 2024-10-30 RX ORDER — INSULIN LISPRO 100/ML
VIAL (ML) SUBCUTANEOUS EVERY 6 HOURS
Refills: 0 | Status: DISCONTINUED | OUTPATIENT
Start: 2024-10-30 | End: 2024-10-30

## 2024-10-30 RX ORDER — MONTELUKAST SODIUM 10 MG/1
1 TABLET, FILM COATED ORAL
Refills: 0 | DISCHARGE

## 2024-10-30 RX ORDER — CEFAZOLIN SODIUM 1 G
2000 VIAL (EA) INJECTION ONCE
Refills: 0 | Status: COMPLETED | OUTPATIENT
Start: 2024-10-30 | End: 2024-10-30

## 2024-10-30 RX ORDER — SODIUM CHLORIDE 9 MG/ML
1000 INJECTION, SOLUTION INTRAMUSCULAR; INTRAVENOUS; SUBCUTANEOUS
Refills: 0 | Status: DISCONTINUED | OUTPATIENT
Start: 2024-10-30 | End: 2024-11-02

## 2024-10-30 RX ORDER — HEPARIN SODIUM 10000 [USP'U]/ML
5000 INJECTION INTRAVENOUS; SUBCUTANEOUS EVERY 8 HOURS
Refills: 0 | Status: DISCONTINUED | OUTPATIENT
Start: 2024-10-30 | End: 2024-10-30

## 2024-10-30 RX ORDER — METOPROLOL TARTRATE 50 MG
5 TABLET ORAL EVERY 6 HOURS
Refills: 0 | Status: DISCONTINUED | OUTPATIENT
Start: 2024-10-30 | End: 2024-11-01

## 2024-10-30 RX ORDER — METOPROLOL TARTRATE 50 MG
5 TABLET ORAL EVERY 6 HOURS
Refills: 0 | Status: DISCONTINUED | OUTPATIENT
Start: 2024-10-30 | End: 2024-10-30

## 2024-10-30 RX ORDER — ONDANSETRON HYDROCHLORIDE 2 MG/ML
4 INJECTION, SOLUTION INTRAMUSCULAR; INTRAVENOUS EVERY 6 HOURS
Refills: 0 | Status: DISCONTINUED | OUTPATIENT
Start: 2024-10-30 | End: 2024-11-05

## 2024-10-30 RX ADMIN — Medication 0.5 MILLIGRAM(S): at 15:18

## 2024-10-30 RX ADMIN — PIPERACILLIN AND TAZOBACTAM 25 GRAM(S): .5; 4 INJECTION, POWDER, LYOPHILIZED, FOR SOLUTION INTRAVENOUS at 14:15

## 2024-10-30 RX ADMIN — Medication 400 MILLIGRAM(S): at 17:40

## 2024-10-30 RX ADMIN — ONDANSETRON HYDROCHLORIDE 4 MILLIGRAM(S): 2 INJECTION, SOLUTION INTRAMUSCULAR; INTRAVENOUS at 06:59

## 2024-10-30 RX ADMIN — PIPERACILLIN AND TAZOBACTAM 25 GRAM(S): .5; 4 INJECTION, POWDER, LYOPHILIZED, FOR SOLUTION INTRAVENOUS at 22:23

## 2024-10-30 RX ADMIN — ONDANSETRON HYDROCHLORIDE 4 MILLIGRAM(S): 2 INJECTION, SOLUTION INTRAMUSCULAR; INTRAVENOUS at 20:18

## 2024-10-30 RX ADMIN — Medication 400 MILLIGRAM(S): at 13:59

## 2024-10-30 RX ADMIN — ALBUMIN HUMAN 500 MILLILITER(S): 50 SOLUTION INTRAVENOUS at 13:44

## 2024-10-30 RX ADMIN — ONDANSETRON HYDROCHLORIDE 4 MILLIGRAM(S): 2 INJECTION, SOLUTION INTRAMUSCULAR; INTRAVENOUS at 14:37

## 2024-10-30 RX ADMIN — Medication 0.5 MILLIGRAM(S): at 14:37

## 2024-10-30 RX ADMIN — Medication 200 MILLIGRAM(S): at 15:43

## 2024-10-30 RX ADMIN — PIPERACILLIN AND TAZOBACTAM 25 GRAM(S): .5; 4 INJECTION, POWDER, LYOPHILIZED, FOR SOLUTION INTRAVENOUS at 05:29

## 2024-10-30 RX ADMIN — KETOROLAC TROMETHAMINE 15 MILLIGRAM(S): 30 INJECTION INTRAMUSCULAR; INTRAVENOUS at 05:28

## 2024-10-30 RX ADMIN — Medication 75 MILLILITER(S): at 13:34

## 2024-10-30 RX ADMIN — Medication 1000 MILLIGRAM(S): at 14:20

## 2024-10-30 RX ADMIN — Medication 0.5 MILLIGRAM(S): at 14:20

## 2024-10-30 RX ADMIN — HEPARIN SODIUM 5000 UNIT(S): 10000 INJECTION INTRAVENOUS; SUBCUTANEOUS at 05:28

## 2024-10-30 RX ADMIN — Medication 400 MILLIGRAM(S): at 01:47

## 2024-10-30 RX ADMIN — ALBUMIN HUMAN 500 MILLILITER(S): 50 SOLUTION INTRAVENOUS at 14:21

## 2024-10-30 RX ADMIN — Medication 0.5 MILLIGRAM(S): at 15:30

## 2024-10-30 RX ADMIN — BENZOCAINE 1 SPRAY(S): 200 GEL ORAL at 23:24

## 2024-10-30 RX ADMIN — Medication 1: at 06:13

## 2024-10-30 RX ADMIN — Medication 5 MILLIGRAM(S): at 05:28

## 2024-10-30 RX ADMIN — MORPHINE SULFATE 4 MILLIGRAM(S): 30 TABLET, EXTENDED RELEASE ORAL at 00:55

## 2024-10-30 NOTE — CONSULT NOTE ADULT - PROBLEM SELECTOR RECOMMENDATION 4
Chronic, on home albuterol inhaler PRN  - cont albuterol inhaler PRN Chronic, on home Fenofibrate 160mg qd, Atorvastatin 10mg qhs  - hold home PO meds while NPO  - restart when able

## 2024-10-30 NOTE — H&P ADULT - NSHPLABSRESULTS_GEN_ALL_CORE
11.1   21.12 )-----------( 368      ( 29 Oct 2024 18:20 )             34.1     10-29    135  |  101  |  26[H]  ----------------------------<  142[H]  3.7   |  25  |  1.30    Ca    9.0      29 Oct 2024 18:20    TPro  8.0  /  Alb  3.2[L]  /  TBili  1.0  /  DBili  x   /  AST  21  /  ALT  25  /  AlkPhos  51  10-29      ACC: [id number] EXAM:  CT ABDOMEN AND PELVIS IC   ORDERED BY: ESPERANZA MOLINA     PROCEDURE DATE:  10/29/2024          INTERPRETATION:  CLINICAL INFORMATION: Right lower quadrant pain. Fever.    COMPARISON: None.    CONTRAST/COMPLICATIONS:  IV Contrast: Omnipaque 350  90 cc administered   10 cc discarded  Oral Contrast: NONE  Complications: None reported at time of study completion    PROCEDURE:  CT of the Abdomen and Pelvis was performed.  Sagittal and coronal reformats were performed.    FINDINGS:  LOWER CHEST: Bibasilar subsegmental atelectasis    LIVER: Hepatic steatosis.  BILE DUCTS: Normal caliber.  GALLBLADDER: Within normal limits.  SPLEEN: Within normal limits.  PANCREAS: Within normal limits.  ADRENALS: Indeterminant left adrenal 2.0 cm nodule.  KIDNEYS/URETERS: Bilateral renal subcentimeter hypodense foci, too small   to characterize. No hydronephrosis. Symmetric renal enhancement.    BLADDER: Within normal limits.  REPRODUCTIVE ORGANS: Normal uterus.    BOWEL: Colonic diverticulosis. Mural thickening of the sigmoid colon with   fat stranding. There is associated right adnexal abscess measuring 3.6 x   2.7 cm (301/92). Normal appendix.  No bowel obstruction.  PERITONEUM/RETROPERITONEUM: Small pneumoperitoneum.  VESSELS: Atherosclerotic changes.  LYMPH NODES: No lymphadenopathy.  ABDOMINAL WALL: Within normal limits.  BONES: Degenerative changes.    IMPRESSION:  Acute sigmoid diverticulitis with associated right adnexal abscess and   perforation evidenced by small pneumoperitoneum.  After resolution of acute infection/inflammation, follow-up colonoscopy   can be considered to rule out underlying lesion.    Findings were discussed with PHOEBE MOLINA [id number] 10/29/2024   8:38 PM by Dr. Russo with read back confirmation.    Indeterminant left adrenal 2.0 cm nodule. Consider adrenal CT in 12   months.    SHAHRIAR RUSSO MD; Attending Radiologist  This document has been electronically signed. Oct 29 2024  8:41PM

## 2024-10-30 NOTE — CONSULT NOTE ADULT - ATTENDING COMMENTS
79 year old female with PMHx HTN, well-controlled asthma, HLD, prediabetes presenting to [place] ED with abdominal pain, admitted for acute sigmoid diverticulitis with abscess and perforation    - Patient without symptoms of angina or heart failure at this time.  - EKG sinus tach with PVCs  - No meaningful evidence of volume overload.  - Previous TTE  (1/2024): LVEF 67%, LVH, mild left atrial dilation.    - on home HCTZ 25mg qd, Valsartan 320mg qd, Metoprolol tartrate 100mg TID  - hold home oral meds while NPO and resume when able   - on 5mg lopressor q6h while npo  - Monitor and replete lytes, keep K>4, Mg>2.    - optimized from cardiovascular standpoint to proceed with procedure with routine hemodynamic monitoring    - Other cardiovascular workup will depend on clinical course.  - All other workup per primary team.

## 2024-10-30 NOTE — CONSULT NOTE ADULT - PROBLEM SELECTOR RECOMMENDATION 6
dvt ppx: heparin 5000u q8h Chronic- not on home meds  - f/u A1C  - DELFINO  - hypoglycemia protocol  - FS q6h while NPO

## 2024-10-30 NOTE — H&P ADULT - NSHPREVIEWOFSYSTEMS_GEN_ALL_CORE
Head: denies headaches, dizziness & lightheadedness  Eyes: denies changes in vision, eye pain, double vision & eye discharge  Ears: denies changes in hearing & ear discharge  Nose: denies rhinorrhea  Mouth: denies bleeding gums & sore tongue & sore throat  Neck: denies swollen lymph nodes   Respiratory: denies SOB, cough, sputum production, wheezing  Cardiac: denies CP & irregular heart beat  Abdominal: abdominal pain.    : denies dysuria, frequent urination, hematuria  Musculoskeletal: denies joint pain & muscle pain  Neuro: denies involuntary muscle movements  Psych: no depression, no anxiety

## 2024-10-30 NOTE — H&P ADULT - NSHPPHYSICALEXAM_GEN_ALL_CORE
PHYSICAL EXAM:  GENERAL: NAD, lying in bed comfortably  HEAD:  Atraumatic, Normocephalic  EYES: EOMI, PERRLA, conjunctiva and sclera clear  ENT: Moist mucous membranes  NECK: Supple, No JVD  CHEST/LUNG: Clear to auscultation bilaterally; No rales, rhonchi, wheezing, or rubs. Unlabored respirations  HEART: Regular rate and rhythm; No murmurs, rubs, or gallops  ABDOMEN: +distended, TTP mild palpation in LLQ/RLQ, no peritoneal signs   EXTREMITIES:  2+ Peripheral Pulses, brisk capillary refill. No clubbing, cyanosis, or edema  NERVOUS SYSTEM:  Alert & Oriented X3, speech clear. No deficits   MSK: FROM all 4 extremities, full and equal strength  SKIN: No rashes or lesions

## 2024-10-30 NOTE — BRIEF OPERATIVE NOTE - NSICDXBRIEFPROCEDURE_GEN_ALL_CORE_FT
PROCEDURES:  Exploratory laparotomy with Johnathan procedure 30-Oct-2024 12:57:36  Kirt Morales  Drainage of abdominal abscess 30-Oct-2024 12:58:17  Kirt Morales

## 2024-10-30 NOTE — CONSULT NOTE ADULT - PROBLEM SELECTOR RECOMMENDATION 5
Chronic- not on home meds  - f/u A1C  - DELFINO  - hypoglycemia protocol  - FS q6h while NPO Chronic, on home albuterol inhaler PRN  - cont albuterol inhaler PRN  - hold home oral montelukast until able

## 2024-10-30 NOTE — CONSULT NOTE ADULT - PROBLEM SELECTOR RECOMMENDATION 9
Patient admitted for acute diverticulitis with perforation and abscess   - CT ab pelvis: Acute sigmoid diverticulitis with associated right adnexal abscess and perforation evidenced by small pneumoperitoneum.  - WBC 21.12, febrile to 100.9 on admission  - tylenol PRN for fever  - NPO/IVF  - on IV Abx; zosyn  - pain control: s/p morphine x1, ofirmev x1, toradol PRN for moderate pain ordered  - Monitor for bowel function  - Serial abdominal exams  - Surgery, Dr. Morales , primary  - EKG - sinus tach with PVCs, possible L atrial enlargement, rate 106, Qtc 454  - Sinus tachycardia likely 2/2 pain  - Patient currently has no active cardiac conditions. No signs of ischemia, ADHF, clinical exam not consistent with stenotic valvular disease, some T wave changes seen on EKG.  - Baseline functional capacity is > 4 METS. RCRI score is 1. Patient is currently sinus tachycardic, likely 2/2 pain.   - Will need cardiac optimization for surgery, cardio, [facility], consulted. Patient admitted for acute diverticulitis with perforation and abscess   - CT ab pelvis: Acute sigmoid diverticulitis with associated right adnexal abscess and perforation evidenced by small pneumoperitoneum.  - WBC 21.12, febrile to 100.9 on admission  - tylenol PRN for fever  - NPO/IVF  - on IV Abx; zosyn  - pain control: s/p morphine x1, ofirmev x1, toradol PRN for moderate pain ordered  - zofran PRN for nausea  - Monitor for bowel function  - Serial abdominal exams  - Surgery, Dr. Morales , primary  - EKG - sinus tach with PVCs, possible L atrial enlargement, rate 106, Qtc 454  - No surgical intervention at this time, but if surgery is indicated - pt will need cardiac optimization for surgery, - cardio, [facility], consulted.

## 2024-10-30 NOTE — H&P ADULT - ASSESSMENT
79 year old female with PMHx HTN, well-controlled asthma, HLD, admitted for acute sigmoid diverticulitis with abscess and perforation.  VSS.  Leukocytosis noted.  Abdominal exam with TTP RLQ/LLQ without peritoneal findings.

## 2024-10-30 NOTE — CONSULT NOTE ADULT - ASSESSMENT
79 year old female with PMHx HTN, well-controlled asthma, HLD, admitted for acute sigmoid diverticulitis with abscess and perforation.  VSS.  Leukocytosis noted.  Abdominal exam with TTP RLQ/LLQ without peritoneal findings.  Admitted for diverticulitis w/ perforation and abscess. Consulted for medical optimization.

## 2024-10-30 NOTE — ED ADULT NURSE REASSESSMENT NOTE - NS ED NURSE REASSESS COMMENT FT1
Rechecked vital signs done noted with fever of 100.9F orally. Surgical PHOEBE Luis made aware with order.

## 2024-10-30 NOTE — CONSULT NOTE ADULT - ASSESSMENT
Assessment: 79 year old female with PMHx HTN, well-controlled asthma, HLD, prediabetes presenting to [facility] with abdominal pain, admitted for diverticulitis.     Plan:   - Patient without symptoms of angina or heart failure at this time.  - No clear evidence of acute ischemia, trops negative x 2. Will follow up third set.  - His CKs are flat, suggesting against acute atherosclerotic plaque rupture.  - Biomarker trend is not consistent with plaque rupture but rather demand ischemia. Monitor closely for the development of anginal symptoms or clinical signs of ischemia.   - CT Coronary 2023: Calcium score 340, no significant stenosis  - EKG - sinus tach with PVCs, possible L atrial enlargement, rate 106, Qtc 454  - Chronic, on home Fenofibrate 160mg qd, Atorvastatin 10mg qhs  - hold home PO meds while NPO and resume when able     - No meaningful evidence of volume overload.  - Previous TTE  (1/2024): LVEF 67%, LVH, mild left atrial dilation.  - Repeat echo pending   - Strict I/Os, daily weights.    - BP well controlled, monitor routine hemodynamics.  - Chronic, on home HCTZ 25mg qd, Valsartan 320mg qd, Metoprolol tartrate 100mg TID  - hold home oral meds while NPO  and resume when able   - on 5mg lopressor q6h with hold parameters    - Baseline functional capacity is < 4 METS  - no h/o cardiac cath or stents     - Monitor and replete lytes, keep K>4, Mg>2.    - Other cardiovascular workup will depend on clinical course.  - All other workup per primary team.  - Will continue to follow.             Assessment: 79 year old female with PMHx HTN, well-controlled asthma, HLD, prediabetes presenting to [facility] with abdominal pain, admitted for acute sigmoid diverticulitis with abscess and perforation.  VSS.  Leukocytosis noted.  Abdominal exam with TTP RLQ/LLQ without peritoneal findings.  Admitted for diverticulitis w/ perforation and abscess.    Plan:   - Patient without symptoms of angina or heart failure at this time.  - CT Coronary 2023: Calcium score 340, no significant stenosis  - EKG sinus tach with PVCs, possible L atrial enlargement, rate 106, Qtc 454  - Chronic, on home Fenofibrate 160mg qd, Atorvastatin 10mg qhs  - hold home PO meds while NPO and resume when able     - No meaningful evidence of volume overload.  - Previous TTE  (1/2024): LVEF 67%, LVH, mild left atrial dilation.  - Repeat echo pending   - Strict I/Os, daily weights.    - BP well controlled, monitor routine hemodynamics.  - Chronic, on home HCTZ 25mg qd, Valsartan 320mg qd, Metoprolol tartrate 100mg TID  - hold home oral meds while NPO  and resume when able   - on 5mg lopressor q6h with hold parameters    - Baseline functional capacity is < 4 METS  - no h/o cardiac cath or stents   Pt has no active ischemia, decompensated heart failure, unstable arrythmia, or severe stenotic valvular disease, and has __ cardiac risk factors. In the setting of low risk ___, he/she is optimized from cardiovascular standpoint to proceed with planned procedure with routine hemodynamic monitoring.   - Pt has no modifiable active cardiac risk factors and in the setting of low risk _____, patient is optimized as best as possible from cardiovascular standpoint to proceed with planned procedure with routine hemodynamic monitoring.    - Monitor and replete lytes, keep K>4, Mg>2.    - Other cardiovascular workup will depend on clinical course.  - All other workup per primary team.  - Will continue to follow.             Assessment: 79 year old female with PMHx HTN, well-controlled asthma, HLD, prediabetes presenting to [facility] with abdominal pain, admitted for acute sigmoid diverticulitis with abscess and perforation.  VSS.  Leukocytosis noted.  Abdominal exam with TTP RLQ/LLQ without peritoneal findings.  Admitted for diverticulitis w/ perforation and abscess.    Plan:   - Patient without symptoms of angina or heart failure at this time.  - CT Coronary 2023: Calcium score 340, no significant stenosis  - EKG sinus tach with PVCs, possible L atrial enlargement, rate 106, Qtc 454  - Chronic, on home Fenofibrate 160mg qd, Atorvastatin 10mg qhs  - hold home PO meds while NPO and resume when able     - No meaningful evidence of volume overload.  - Previous TTE  (1/2024): LVEF 67%, LVH, mild left atrial dilation.  - Repeat echo pending   - Strict I/Os, daily weights.    - BP well controlled, monitor routine hemodynamics.  - Chronic, on home HCTZ 25mg qd, Valsartan 320mg qd, Metoprolol tartrate 100mg TID  - hold home oral meds while NPO  and resume when able   - on 5mg lopressor q6h with hold parameters    - RCRI class 1, Baseline functional capacity is < 4 METS, No CP or SOB, No ACS symptoms. Denies smoking history. No history of afib, CHF, or valvular disease. No history of MI  or CAD. no h/o stents.     - Monitor and replete lytes, keep K>4, Mg>2.    - Other cardiovascular workup will depend on clinical course.  - All other workup per primary team.  - Will continue to follow.             Assessment: 79 year old female with PMHx HTN, well-controlled asthma, HLD, prediabetes presenting to [facility] with abdominal pain, admitted for acute sigmoid diverticulitis with abscess and perforation.  VSS.  Leukocytosis noted.  Abdominal exam with TTP RLQ/LLQ without peritoneal findings.  Admitted for diverticulitis w/ perforation and abscess.    Plan:   - Patient without symptoms of angina or heart failure at this time.  - CT Coronary 2023: Calcium score 340, no significant stenosis  - EKG sinus tach with PVCs, possible L atrial enlargement, rate 106, Qtc 454  - Chronic, on home Fenofibrate 160mg qd, Atorvastatin 10mg qhs  - hold home PO meds while NPO and resume when able     - No meaningful evidence of volume overload.  - Previous TTE  (1/2024): LVEF 67%, LVH, mild left atrial dilation.  - Repeat echo pending   - Strict I/Os, daily weights.    - BP well controlled, monitor routine hemodynamics.  - Chronic, on home HCTZ 25mg qd, Valsartan 320mg qd, Metoprolol tartrate 100mg TID  - hold home oral meds while NPO  and resume when able   - on 5mg lopressor q6h with hold parameters    - Baseline functional capacity is < 4 METS, No CP or SOB, No ACS symptoms. Denies smoking history. No history of afib, CHF, or valvular disease. No history of MI  or CAD. no h/o stents. Pts vitals are stable. she is optimized from cardiovascular standpoint to proceed with procedure with routine hemodynamic monitoring if indicated    - Monitor and replete lytes, keep K>4, Mg>2.    - Other cardiovascular workup will depend on clinical course.  - All other workup per primary team.               Assessment: 79 year old female with PMHx HTN, well-controlled asthma, HLD, prediabetes presenting to [facility] with abdominal pain, admitted for acute sigmoid diverticulitis with abscess and perforation.  VSS.  Leukocytosis noted.  Abdominal exam with TTP RLQ/LLQ without peritoneal findings.  Admitted for diverticulitis w/ perforation and abscess.    Plan:   - Patient without symptoms of angina or heart failure at this time.  - CT Coronary 2023: Calcium score 340, no significant stenosis  - EKG sinus tach with PVCs, possible L atrial enlargement, rate 106, Qtc 454  - Chronic, on home Fenofibrate 160mg qd, Atorvastatin 10mg qhs  - hold home PO meds while NPO and resume when able     - No meaningful evidence of volume overload.  - Previous TTE  (1/2024): LVEF 67%, LVH, mild left atrial dilation.  - Repeat echo pending   - Strict I/Os, daily weights.    - BP well controlled, monitor routine hemodynamics.  - Chronic, on home HCTZ 25mg qd, Valsartan 320mg qd, Metoprolol tartrate 100mg TID  - hold home oral meds while NPO  and resume when able   - on 5mg lopressor q6h with hold parameters    - Baseline functional capacity is < 4 METS, No CP or SOB, No ACS symptoms. Denies smoking history. No history of afib, CHF, or valvular disease. No history of MI  or CAD. no h/o stents. she is optimized from cardiovascular standpoint to proceed with procedure with routine hemodynamic monitoring if indicated    - Monitor and replete lytes, keep K>4, Mg>2.    - Other cardiovascular workup will depend on clinical course.  - All other workup per primary team.

## 2024-10-30 NOTE — PROGRESS NOTE ADULT - SUBJECTIVE AND OBJECTIVE BOX
s/p ex-lap, Johnathan's   Dr. Morales    S: Patient seen and examined at bedside, reporting that she is nauseous, had received Phenergan in the PACU as well as Zofran, pain well managed, reports that she is sleepy, daughter at bedside reports that she has been snoozing. Denies any vomiting, chest pain, fever or chills. Slightly short of breath, on 2L O2.       O:  T(C): 36.7 (10-30-24 @ 14:26), Max: 36.7 (10-30-24 @ 14:26)  T(F): 98.1 (10-30-24 @ 14:26), Max: 98.1 (10-30-24 @ 14:26)  HR: 79 (10-30-24 @ 16:00) (79 - 98)  BP: 136/66 (10-30-24 @ 16:00) (130/71 - 147/75)  RR: 11 (10-30-24 @ 16:00) (11 - 18)  SpO2: 99% (10-30-24 @ 16:00) (96% - 100%)  Wt(kg): --                        8.6    20.62 )-----------( 296      ( 30 Oct 2024 11:30 )             26.6     10-30    137  |  105  |  23  ----------------------------<  136[H]  3.5   |  22  |  1.30    Ca    7.9[L]      30 Oct 2024 11:30  Phos  2.5     10-30  Mg     1.8     10-30    TPro  8.0  /  Alb  3.2[L]  /  TBili  1.0  /  DBili  x   /  AST  21  /  ALT  25  /  AlkPhos  51  10-29      Gen: NAD, resting comfortably in bed  C/V: NSR  Pulm: Nonlabored breathing, no respiratory distress  Abd: abdomen is soft, nontender, non distended, no rebound or guarding, incisions are clean dry and intact, ostomy appliance in place without any output   Extrem: WWP, no calf edema, SCDs in place      A/P: 79yFemale s/p Johnathan's for perforated sigmoid diverticulitis   NPO/IVF/NGT  IV Abx  Pain/nausea control  DVT ppx, SCDs, Chemical ppx pending AM hgb (s/p 1U PRBC intraop)  Dispo plan: Remote tele

## 2024-10-30 NOTE — CONSULT NOTE ADULT - ATTENDING COMMENTS
pt seen and examined at bedside   79 Y.O.F presented to ED due to abdominal pain , found to have complicated acute diverticulitis with perforation and abscess formation   patient stated that she had once last year or 1.5 years ago an episode that her GI told her it was acute diverticulitis but did not require admission and her GI told her that she would not do colonoscopy on her as not recommended given her age     # acute complicated Diverticulitis with perforation and abscess formation   # sepsis sec to acute diverticulitis   - met sepsis criteria  - recommend c/w IVF, NPO , Zosyn   - recommend ID consultation   - EKG showed sinus tachycardia with non specific ST-T wave changes , follows with cardiologist as outpatient due for follow up next month , stated that she had stress test about 1 year ago that per her cardiologist stated was ok , recommended follow up   no h/o cardiac cath or stents   METS < 4   ECHO ordered   patient if will need surgical intervention will require cardiology clearance    hold PO BP meds for now , BP is stable currently does not require IV meds especially in sepsis condition   - recommend to f/u blood cx   - serial abdominal exam and further intervention per surgery team  pain control and DVT ppx per primary team     will need GI follow up after discharge , and assess for colonoscopy after 4-6 weeks of acute diverticulitis pt seen and examined at bedside   79 Y.O.F presented to ED due to abdominal pain , found to have complicated acute diverticulitis with perforation and abscess formation   patient stated that she had once last year or 1.5 years ago an episode that her GI told her it was acute diverticulitis but did not require admission and her GI told her that she would not do colonoscopy on her as not recommended given her age     # acute complicated Diverticulitis with perforation and abscess formation   # sepsis sec to acute diverticulitis   - met sepsis criteria  - recommend c/w IVF, NPO , Zosyn   - recommend ID consultation   - trend for fever and leukocytosis   - EKG showed sinus tachycardia with non specific ST-T wave changes , follows with cardiologist as outpatient due for follow up next month , stated that she had stress test about 1 year ago that per her cardiologist stated was ok , recommended follow up   no h/o cardiac cath or stents   METS < 4   ECHO ordered   patient if will need surgical intervention will require cardiology clearance    hold PO BP meds for now , BP is stable currently does not require IV meds especially in sepsis condition   - recommend to f/u blood cx   - serial abdominal exam and further intervention per surgery team  pain control and DVT ppx per primary team     will need GI follow up after discharge , and assess for colonoscopy after 4-6 weeks of acute diverticulitis

## 2024-10-30 NOTE — CONSULT NOTE ADULT - SUBJECTIVE AND OBJECTIVE BOX
Patient is a 79y old  Female who presents with a chief complaint of diverticulitis w/ perforation and abscess (30 Oct 2024 00:43)      FROM ADMISSION H+P:   HPI:  79 year old female with PMHx HTN, well-controlled asthma, HLD presenting to [place] ED with abdominal pain.  Patient states abdominal pain started about 1 week ago.  She locates pain to RLQ, states is sharp in nature and at time of onset was 7/10 in intensity.  Over the past week, patient states pain has increased to now 10/10.  Patient saw PCP earlier today who advised come to ED for further evaluation.  Patient also notes fever 102 taken at home prior to arrival.  Patient states pain has radiated now into LLQ.  Patient denies change in bowel habits, chills, n/v, PO intolerance, chest pain, SOB, calf pain, palpitations.   (30 Oct 2024 00:43)      ----  INTERVAL HPI/OVERNIGHT EVENTS: Pt seen and evaluated at the bedside.     ----  PAST MEDICAL & SURGICAL HISTORY:  Hypertension  Hyperlipidemia  Asthma, well controlled  Prediabetes  No significant past surgical history    ----  Home medications:      ----  FAMILY HISTORY:  No pertinent family history in first degree relatives  ----  Allergies    sulfa drugs (Unknown)    Intolerances      ----  Social History:  - etoh: denies  - tobacco: denies  - recreational drug use: denies  - Able to perform ADLs independently  ----  REVIEW OF SYSTEMS:  CONSTITUTIONAL: denies fever, chills, fatigue, weakness  HEENT: denies blurred vision, sore throat  SKIN: denies new lesions, rash  CARDIOVASCULAR: denies chest pain, chest pressure, palpitations  RESPIRATORY: denies shortness of breath  GASTROINTESTINAL: +abdominal pain  GENITOURINARY: denies dysuria, discharge  NEUROLOGICAL: denies numbness, headache, focal weakness  MUSCULOSKELETAL: denies new joint pain, muscle aches  HEMATOLOGIC: denies gross bleeding, bruising    ----  PHYSICAL EXAM:  GENERAL: patient appears well, no acute distress, appropriately interactive  EYES: sclera clear, no exudates  ENMT: moist mucous membranes  LUNGS: good air entry bilaterally, clear to auscultation, symmetric breath sounds, no wheezing or rhonchi appreciated  HEART: regular rate and rhythm, no murmurs noted, no noted edema to b/l LE  GASTROINTESTINAL: +TTP LLQ and RLQ abdomen is soft, distended  INTEGUMENT: no rashes  MUSCULOSKELETAL: no clubbing or cyanosis, no obvious deformity  NEUROLOGIC: awake, alert, oriented x3, no acute deficits      T(C): 38.3 (10-30-24 @ 01:16), Max: 38.3 (10-30-24 @ 01:16)  HR: 106 (10-30-24 @ 01:16) (106 - 122)  BP: 137/76 (10-30-24 @ 01:16) (118/71 - 171/84)  RR: 16 (10-30-24 @ 01:16) (16 - 18)  SpO2: 94% (10-30-24 @ 01:16) (94% - 96%)  Wt(kg): --    ----  I&O's Summary      LABS:                        11.1   21.12 )-----------( 368      ( 29 Oct 2024 18:20 )             34.1     10-29    135  |  101  |  26[H]  ----------------------------<  142[H]  3.7   |  25  |  1.30    Ca    9.0      29 Oct 2024 18:20    TPro  8.0  /  Alb  3.2[L]  /  TBili  1.0  /  DBili  x   /  AST  21  /  ALT  25  /  AlkPhos  51  10-29    PT/INR - ( 29 Oct 2024 19:10 )   PT: 13.3 sec;   INR: 1.14 ratio         PTT - ( 29 Oct 2024 19:10 )  PTT:29.9 sec  Urinalysis Basic - ( 29 Oct 2024 19:10 )    Color: Yellow / Appearance: Clear / S.020 / pH: x  Gluc: x / Ketone: Negative mg/dL  / Bili: Negative / Urobili: 0.2 mg/dL   Blood: x / Protein: Trace mg/dL / Nitrite: Negative   Leuk Esterase: Negative / RBC: x / WBC x   Sq Epi: x / Non Sq Epi: x / Bacteria: x      CAPILLARY BLOOD GLUCOSE    Urinalysis with Rflx Culture (collected 10-29-24 @ 19:10)      ----  Personally reviewed:  Vital sign trends: [x  ] yes    [  ] no     [  ] n/a  Laboratory results: [ x ] yes    [  ] no     [  ] n/a  Radiology results: [ x ] yes    [  ] no     [  ] n/a  Culture results: [  ] yes    [  ] no     [ x ] n/a  Consultant recommendations: [  ] yes    [  ] no     [ x ] n/a Patient is a 79y old  Female who presents with a chief complaint of diverticulitis w/ perforation and abscess (30 Oct 2024 00:43)      FROM ADMISSION H+P:   HPI:  79 year old female with PMHx HTN, well-controlled asthma, HLD presenting to [place] ED with abdominal pain.  Patient states abdominal pain started about 1 week ago.  She locates pain to RLQ, states is sharp in nature and at time of onset was 7/10 in intensity.  Over the past week, patient states pain has increased to now 10/10.  Patient saw PCP earlier today who advised come to ED for further evaluation.  Patient also notes fever 102 taken at home prior to arrival.  Patient states pain has radiated now into LLQ.  Patient denies change in bowel habits, chills, n/v, PO intolerance, chest pain, SOB, calf pain, palpitations.   (30 Oct 2024 00:43)      ----  INTERVAL HPI/OVERNIGHT EVENTS: Pt seen and evaluated at the bedside.     ----  PAST MEDICAL & SURGICAL HISTORY:  Hypertension  Hyperlipidemia  Asthma, well controlled  Prediabetes  No significant past surgical history    ----  Home medications:   HCTZ 25mg qd  Valsartan 320mg qd  Metoprolol tartrate 100mg TID  Albuterol inhaler PRN for asthma  Montelukast 10mg qhs  Fenofibrate 160mg qd  Atorvastatin 10mg qhs      ----  FAMILY HISTORY:  No pertinent family history in first degree relatives  ----  Allergies    sulfa drugs (Unknown)    Intolerances      ----  Social History:  - etoh: denies  - tobacco: denies  - recreational drug use: denies  - Able to perform ADLs independently  ----  REVIEW OF SYSTEMS:  CONSTITUTIONAL: denies fever, chills, fatigue, weakness  HEENT: denies blurred vision, sore throat  SKIN: denies new lesions, rash  CARDIOVASCULAR: denies chest pain, chest pressure, palpitations  RESPIRATORY: denies shortness of breath  GASTROINTESTINAL: +abdominal pain  GENITOURINARY: denies dysuria, discharge  NEUROLOGICAL: denies numbness, headache, focal weakness  MUSCULOSKELETAL: denies new joint pain, muscle aches  HEMATOLOGIC: denies gross bleeding, bruising    ----  PHYSICAL EXAM:  GENERAL: patient appears well, no acute distress, appropriately interactive  EYES: sclera clear, no exudates  ENMT: moist mucous membranes  LUNGS: good air entry bilaterally, clear to auscultation, symmetric breath sounds, no wheezing or rhonchi appreciated  HEART: regular rate and rhythm, no murmurs noted, no noted edema to b/l LE  GASTROINTESTINAL: +TTP LLQ and RLQ abdomen is soft, distended  INTEGUMENT: no rashes  MUSCULOSKELETAL: no clubbing or cyanosis, no obvious deformity  NEUROLOGIC: awake, alert, oriented x3, no acute deficits      T(C): 38.3 (10-30-24 @ 01:16), Max: 38.3 (10-30-24 @ 01:16)  HR: 106 (10-30-24 @ :16) (106 - 122)  BP: 137/76 (10-30-24 @ :16) (118/71 - 171/84)  RR: 16 (10-30-24 @ :16) (16 - 18)  SpO2: 94% (10-30-24 @ :16) (94% - 96%)  Wt(kg): --    ----  I&O's Summary      LABS:                        11.1   21.12 )-----------( 368      ( 29 Oct 2024 18:20 )             34.1     10-29    135  |  101  |  26[H]  ----------------------------<  142[H]  3.7   |  25  |  1.30    Ca    9.0      29 Oct 2024 18:20    TPro  8.0  /  Alb  3.2[L]  /  TBili  1.0  /  DBili  x   /  AST  21  /  ALT  25  /  AlkPhos  51  10-29    PT/INR - ( 29 Oct 2024 19:10 )   PT: 13.3 sec;   INR: 1.14 ratio         PTT - ( 29 Oct 2024 19:10 )  PTT:29.9 sec  Urinalysis Basic - ( 29 Oct 2024 19:10 )    Color: Yellow / Appearance: Clear / S.020 / pH: x  Gluc: x / Ketone: Negative mg/dL  / Bili: Negative / Urobili: 0.2 mg/dL   Blood: x / Protein: Trace mg/dL / Nitrite: Negative   Leuk Esterase: Negative / RBC: x / WBC x   Sq Epi: x / Non Sq Epi: x / Bacteria: x      CAPILLARY BLOOD GLUCOSE    Urinalysis with Rflx Culture (collected 10-29-24 @ 19:10)      ----  Personally reviewed:  Vital sign trends: [x  ] yes    [  ] no     [  ] n/a  Laboratory results: [ x ] yes    [  ] no     [  ] n/a  Radiology results: [ x ] yes    [  ] no     [  ] n/a  Culture results: [  ] yes    [  ] no     [ x ] n/a  Consultant recommendations: [  ] yes    [  ] no     [ x ] n/a Patient is a 79y old  Female who presents with a chief complaint of diverticulitis w/ perforation and abscess (30 Oct 2024 00:43)      FROM ADMISSION H+P:   HPI:  79 year old female with PMHx HTN, well-controlled asthma, HLD presenting to [place] ED with abdominal pain.  Patient states abdominal pain started about 1 week ago.  She locates pain to RLQ, states is sharp in nature and at time of onset was 7/10 in intensity.  Over the past week, patient states pain has increased to now 10/10.  Patient saw PCP earlier today who advised come to ED for further evaluation.  Patient also notes fever 102 taken at home prior to arrival.  Patient states pain has radiated now into LLQ.  Patient denies change in bowel habits, chills, n/v, PO intolerance, chest pain, SOB, calf pain, palpitations.   (30 Oct 2024 00:43)      ----  INTERVAL HPI/OVERNIGHT EVENTS: Pt seen and evaluated at the bedside. States she is still having some abdominal pain, but improved from before. She states she is comfortable when lying still, but any movement exacerbates the pain. Denies any nausea, vomiting, chills, diarrhea, constipation, chest pain, SOB.    Outpatient cardiologist is Dr. Shepard - unknown if hx of CHF. states she is unable to walk 1 block without being short of breath. States she was given spironolactone for a little while due to LE edema which made her significantly dehydrated so was stopped on it. She was supposed to follow up with Dr. Shepard in 1 month for TTE.    ----  PAST MEDICAL & SURGICAL HISTORY:  Hypertension  Hyperlipidemia  Asthma, well controlled  Prediabetes  No significant past surgical history    ----  Home medications:   HCTZ 25mg qd  Valsartan 320mg qd  Metoprolol tartrate 100mg TID  Albuterol inhaler PRN for asthma  Montelukast 10mg qhs  Fenofibrate 160mg qd  Atorvastatin 10mg qhs      ----  FAMILY HISTORY:  No pertinent family history in first degree relatives  ----  Allergies    sulfa drugs (Unknown)    Intolerances      ----  Social History:  - etoh: denies  - tobacco: denies  - recreational drug use: denies  - Able to perform ADLs independently  ----  REVIEW OF SYSTEMS:  CONSTITUTIONAL: denies fever, chills, fatigue, weakness  HEENT: denies blurred vision, sore throat  SKIN: denies new lesions, rash  CARDIOVASCULAR: denies chest pain, chest pressure, palpitations  RESPIRATORY: denies shortness of breath  GASTROINTESTINAL: +abdominal pain  GENITOURINARY: denies dysuria, discharge  NEUROLOGICAL: denies numbness, headache, focal weakness  MUSCULOSKELETAL: denies new joint pain, muscle aches  HEMATOLOGIC: denies gross bleeding, bruising    ----  PHYSICAL EXAM:  GENERAL: patient appears well, no acute distress, appropriately interactive  EYES: sclera clear, no exudates  ENMT: moist mucous membranes  LUNGS: good air entry bilaterally, clear to auscultation, symmetric breath sounds, no wheezing or rhonchi appreciated  HEART: regular rate and rhythm, systolic murmur noted, greatest in R upper sternal border, no noted edema to b/l LE  GASTROINTESTINAL: +TTP LLQ and RLQ abdomen is soft, distended  INTEGUMENT: no rashes  MUSCULOSKELETAL: 1+ b/l LE edema  NEUROLOGIC: awake, alert, oriented x3, no acute deficits      T(C): 38.3 (10-30-24 @ 01:16), Max: 38.3 (10-30-24 @ 01:16)  HR: 106 (10-30-24 @ 01:16) (106 - 122)  BP: 137/76 (10-30-24 @ 01:16) (118/71 - 171/84)  RR: 16 (10-30-24 @ 01:16) (16 - 18)  SpO2: 94% (10-30-24 @ 01:16) (94% - 96%)  Wt(kg): --    ----  I&O's Summary      LABS:                        11.1   21.12 )-----------( 368      ( 29 Oct 2024 18:20 )             34.1     10-29    135  |  101  |  26[H]  ----------------------------<  142[H]  3.7   |  25  |  1.30    Ca    9.0      29 Oct 2024 18:20    TPro  8.0  /  Alb  3.2[L]  /  TBili  1.0  /  DBili  x   /  AST  21  /  ALT  25  /  AlkPhos  51  10-29    PT/INR - ( 29 Oct 2024 19:10 )   PT: 13.3 sec;   INR: 1.14 ratio         PTT - ( 29 Oct 2024 19:10 )  PTT:29.9 sec  Urinalysis Basic - ( 29 Oct 2024 19:10 )    Color: Yellow / Appearance: Clear / S.020 / pH: x  Gluc: x / Ketone: Negative mg/dL  / Bili: Negative / Urobili: 0.2 mg/dL   Blood: x / Protein: Trace mg/dL / Nitrite: Negative   Leuk Esterase: Negative / RBC: x / WBC x   Sq Epi: x / Non Sq Epi: x / Bacteria: x      CAPILLARY BLOOD GLUCOSE    Urinalysis with Rflx Culture (collected 10-29-24 @ 19:10)      ----  Personally reviewed:  Vital sign trends: [x  ] yes    [  ] no     [  ] n/a  Laboratory results: [ x ] yes    [  ] no     [  ] n/a  Radiology results: [ x ] yes    [  ] no     [  ] n/a  Culture results: [  ] yes    [  ] no     [ x ] n/a  Consultant recommendations: [  ] yes    [  ] no     [ x ] n/a

## 2024-10-30 NOTE — CONSULT NOTE ADULT - PROBLEM SELECTOR RECOMMENDATION 3
Chronic, on home Fenofibrate 160mg qd, Atorvastatin 10mg qhs  - hold home PO meds while NPO  - restart when able Chronic, on home HCTZ 25mg qd, Valsartan 320mg qd, Metoprolol tartrate 100mg TID  - hold home oral meds while NPO  - on 5mg lopressor q6h with hold parameters  - monitor hemodynamics  - restart home PO meds when sx clears for diet

## 2024-10-30 NOTE — CONSULT NOTE ADULT - SUBJECTIVE AND OBJECTIVE BOX
[facility]         Juliet Garrett Patel, Devyn, Joe      [phone] (office)    Reason for Consult: Edema     Interval HPI: Patient seen and examined at bedside. No acute events overnight.     Her cardiologist, Dr. Shepard began her on spironolactone in January 2024. Spironolactone was discontinued in approx June by nephrologist due to impaired renal function determined via increased creatinine. States she is unable to walk 1 block without being short of breath. She was supposed to follow up with Dr. Shepard in 1 month for TTE.    Outpatient cardiologist: Dr. Shepard       HPI:  79 year old female with PMHx HTN, well-controlled asthma, HLD, prediabetes presenting to [place] ED with abdominal pain.  Patient states abdominal pain started about 1 week ago.  She locates pain to RLQ, states is sharp in nature and at time of onset was 7/10 in intensity.  Over the past week, patient states pain has increased to now 10/10.  Patient saw PCP earlier today who advised come to ED for further evaluation.  Patient also notes fever 102 taken at home prior to arrival.  Patient states pain has radiated now into LLQ.  Patient denies change in bowel habits, chills, n/v, PO intolerance, chest pain, SOB, calf pain, palpitations.   (30 Oct 2024 00:43)      PAST MEDICAL & SURGICAL HISTORY:  Hypertension      Hyperlipidemia      Asthma, well controlled      Prediabetes      No significant past surgical history          SOCIAL HISTORY: No active tobacco, alcohol or illicit drug use    FAMILY HISTORY:  No pertinent family history in first degree relatives        Home Medications:  Albuterol (Eqv-Proventil HFA) 90 mcg/inh inhalation aerosol: 2 inhaled as needed for  bronchospasm (30 Oct 2024 01:03)  atorvastatin 10 mg oral tablet: 1 tab(s) orally once a day (at bedtime) (30 Oct 2024 01:03)  fenofibrate 160 mg oral tablet: 1 tab(s) orally once a day (30 Oct 2024 01:03)  hydroCHLOROthiazide 25 mg oral tablet: 1 tab(s) orally once a day (30 Oct 2024 01:03)  metoprolol tartrate 100 mg oral tablet: 1 tab(s) orally 3 times a day (30 Oct 2024 01:02)  montelukast 10 mg oral tablet: 1 tab(s) orally once a day (at bedtime) (30 Oct 2024 01:03)  valsartan 320 mg oral tablet: 1 tab(s) orally once a day (30 Oct 2024 01:02)      MEDICATIONS  (STANDING):  dextrose 5% + sodium chloride 0.9%. 1000 milliLiter(s) (125 mL/Hr) IV Continuous <Continuous>  dextrose 5%. 1000 milliLiter(s) (50 mL/Hr) IV Continuous <Continuous>  dextrose 5%. 1000 milliLiter(s) (100 mL/Hr) IV Continuous <Continuous>  dextrose 50% Injectable 25 Gram(s) IV Push once  dextrose 50% Injectable 12.5 Gram(s) IV Push once  dextrose 50% Injectable 25 Gram(s) IV Push once  glucagon  Injectable 1 milliGRAM(s) IntraMuscular once  heparin   Injectable 5000 Unit(s) SubCutaneous every 8 hours  influenza  Vaccine (HIGH DOSE) 0.5 milliLiter(s) IntraMuscular once  insulin lispro (ADMELOG) corrective regimen sliding scale   SubCutaneous every 6 hours  metoprolol tartrate Injectable 5 milliGRAM(s) IV Push every 6 hours  piperacillin/tazobactam IVPB.. 3.375 Gram(s) IV Intermittent every 8 hours    MEDICATIONS  (PRN):  albuterol    90 MICROgram(s) HFA Inhaler 1 Puff(s) Inhalation every 8 hours PRN Shortness of Breath and/or Wheezing  dextrose Oral Gel 15 Gram(s) Oral once PRN Blood Glucose LESS THAN 70 milliGRAM(s)/deciliter  ondansetron Injectable 4 milliGRAM(s) IV Push every 8 hours PRN Nausea and/or Vomiting      Allergies    sulfa drugs (Unknown)    Intolerances        REVIEW OF SYSTEMS: Negative except as per HPI.    VITAL SIGNS:   Vital Signs Last 24 Hrs  T(C): 37.2 (30 Oct 2024 04:57), Max: 38.3 (30 Oct 2024 01:16)  T(F): 99 (30 Oct 2024 04:57), Max: 100.9 (30 Oct 2024 01:16)  HR: 101 (30 Oct 2024 04:57) (101 - 122)  BP: 128/74 (30 Oct 2024 04:57) (118/71 - 171/84)  BP(mean): --  RR: 18 (30 Oct 2024 04:57) (16 - 18)  SpO2: 95% (30 Oct 2024 04:57) (94% - 96%)    Parameters below as of 30 Oct 2024 04:57  Patient On (Oxygen Delivery Method): room air        I&O's Summary      PHYSICAL EXAM:  Constitutional: NAD, well-developed  HEENT NC/AT, moist mucous membranes  Pulmonary: Non-labored, breath sounds are clear bilaterally, no wheezing, rales or rhonchi  Cardiovascular: +S1, S2, RRR, no murmur  Gastrointestinal: Soft, nontender, nondistended, normoactive bowel sounds  Extremities: No peripheral edema   Neurological: Alert, strength and sensitivity are grossly intact  Skin: No obvious lesions/rashes  Psych: Mood & affect appropriate    LABS: All Labs Reviewed:                        9.6    25.13 )-----------( 329      ( 30 Oct 2024 05:15 )             29.7                         11.1   21.12 )-----------( 368      ( 29 Oct 2024 18:20 )             34.1     30 Oct 2024 05:15    137    |  104    |  22     ----------------------------<  152    3.6     |  25     |  1.30   29 Oct 2024 18:20    135    |  101    |  26     ----------------------------<  142    3.7     |  25     |  1.30     Ca    8.5        30 Oct 2024 05:15  Ca    9.0        29 Oct 2024 18:20  Phos  2.5       30 Oct 2024 05:15  Mg     1.8       30 Oct 2024 05:15    TPro  8.0    /  Alb  3.2    /  TBili  1.0    /  DBili  x      /  AST  21     /  ALT  25     /  AlkPhos  51     29 Oct 2024 18:20    PT/INR - ( 30 Oct 2024 05:15 )   PT: 14.3 sec;   INR: 1.21 ratio         PTT - ( 30 Oct 2024 05:15 )  PTT:31.9 sec      Blood Culture:         EKG:  from 10/29/24: sinus tachycardia with frequent PVCs, possible LA enlargement, nonspecific ST and T wave abnormality. , rate 106, Qtc 454    RADIOLOGY:  ACC: [id number] EXAM:  CT ABDOMEN AND PELVIS IC   ORDERED BY: ESPERANZA MOLINA     PROCEDURE DATE:  10/29/2024          INTERPRETATION:  CLINICAL INFORMATION: Right lower quadrant pain. Fever.    COMPARISON: None.    CONTRAST/COMPLICATIONS:  IV Contrast: Omnipaque 350  90 cc administered   10 cc discarded  Oral Contrast: NONE  Complications: None reported at time of study completion    PROCEDURE:  CT of the Abdomen and Pelvis was performed.  Sagittal and coronal reformats were performed.    FINDINGS:  LOWER CHEST: Bibasilar subsegmental atelectasis    LIVER: Hepatic steatosis.  BILE DUCTS: Normal caliber.  GALLBLADDER: Within normal limits.  SPLEEN: Within normal limits.  PANCREAS: Within normal limits.  ADRENALS: Indeterminant left adrenal 2.0 cm nodule.  KIDNEYS/URETERS: Bilateral renal subcentimeter hypodense foci, too small   to characterize. No hydronephrosis. Symmetric renal enhancement.    BLADDER: Within normal limits.  REPRODUCTIVE ORGANS: Normal uterus.    BOWEL: Colonic diverticulosis. Mural thickening of the sigmoid colon with   fat stranding. There is associated right adnexal abscess measuring 3.6 x   2.7 cm (301/92). Normal appendix.  No bowel obstruction.  PERITONEUM/RETROPERITONEUM: Small pneumoperitoneum.  VESSELS: Atherosclerotic changes.  LYMPH NODES: No lymphadenopathy.  ABDOMINAL WALL: Within normal limits.  BONES: Degenerative changes.    IMPRESSION:  Acute sigmoid diverticulitis with associated right adnexal abscess and   perforation evidenced by small pneumoperitoneum.  After resolution of acute infection/inflammation, follow-up colonoscopy   can be considered to rule out underlying lesion.    Findings were discussed with PHOEBE MOLINA [id number] 10/29/2024   8:38 PM by Dr. Rodríguez with read back confirmation.    Indeterminant left adrenal 2.0 cm nodule. Consider adrenal CT in 12   months.    SHAHRIAR RODRÍGUEZ MD; Attending Radiologist  This document has been electronically signed. Oct 29 2024  8:41PM      CXR:   [facility]         Juliet Garrett Patel, Devyn, Joe      [phone] (office)    Reason for Consult: Edema     Interval HPI: Patient seen and examined at bedside. No acute events overnight. Pt still remains NPO.     Her cardiologist, Dr. Shepard began her on spironolactone in January 2024. Spironolactone was discontinued in approx June by nephrologist due to impaired renal function determined via increased creatinine. States she is unable to walk 1 block without being short of breath. She was supposed to follow up with Dr. Shepard in 1 month for TTE.    Outpatient cardiologist: Dr. Shepard       HPI:  79 year old female with PMHx HTN, well-controlled asthma, HLD, prediabetes presenting to [place] ED with abdominal pain.  Patient states abdominal pain started about 1 week ago.  She locates pain to RLQ, states is sharp in nature and at time of onset was 7/10 in intensity.  Over the past week, patient states pain has increased to now 10/10.  Patient saw PCP earlier today who advised come to ED for further evaluation.  Patient also notes fever 102 taken at home prior to arrival.  Patient states pain has radiated now into LLQ.  Patient denies change in bowel habits, chills, n/v, PO intolerance, chest pain, SOB, calf pain, palpitations.   (30 Oct 2024 00:43)      PAST MEDICAL & SURGICAL HISTORY:  Hypertension      Hyperlipidemia      Asthma, well controlled      Prediabetes      No significant past surgical history          SOCIAL HISTORY: No active tobacco, alcohol or illicit drug use    FAMILY HISTORY:  No pertinent family history in first degree relatives        Home Medications:  Albuterol (Eqv-Proventil HFA) 90 mcg/inh inhalation aerosol: 2 inhaled as needed for  bronchospasm (30 Oct 2024 01:03)  atorvastatin 10 mg oral tablet: 1 tab(s) orally once a day (at bedtime) (30 Oct 2024 01:03)  fenofibrate 160 mg oral tablet: 1 tab(s) orally once a day (30 Oct 2024 01:03)  hydroCHLOROthiazide 25 mg oral tablet: 1 tab(s) orally once a day (30 Oct 2024 01:03)  metoprolol tartrate 100 mg oral tablet: 1 tab(s) orally 3 times a day (30 Oct 2024 01:02)  montelukast 10 mg oral tablet: 1 tab(s) orally once a day (at bedtime) (30 Oct 2024 01:03)  valsartan 320 mg oral tablet: 1 tab(s) orally once a day (30 Oct 2024 01:02)      MEDICATIONS  (STANDING):  dextrose 5% + sodium chloride 0.9%. 1000 milliLiter(s) (125 mL/Hr) IV Continuous <Continuous>  dextrose 5%. 1000 milliLiter(s) (50 mL/Hr) IV Continuous <Continuous>  dextrose 5%. 1000 milliLiter(s) (100 mL/Hr) IV Continuous <Continuous>  dextrose 50% Injectable 25 Gram(s) IV Push once  dextrose 50% Injectable 12.5 Gram(s) IV Push once  dextrose 50% Injectable 25 Gram(s) IV Push once  glucagon  Injectable 1 milliGRAM(s) IntraMuscular once  heparin   Injectable 5000 Unit(s) SubCutaneous every 8 hours  influenza  Vaccine (HIGH DOSE) 0.5 milliLiter(s) IntraMuscular once  insulin lispro (ADMELOG) corrective regimen sliding scale   SubCutaneous every 6 hours  metoprolol tartrate Injectable 5 milliGRAM(s) IV Push every 6 hours  piperacillin/tazobactam IVPB.. 3.375 Gram(s) IV Intermittent every 8 hours    MEDICATIONS  (PRN):  albuterol    90 MICROgram(s) HFA Inhaler 1 Puff(s) Inhalation every 8 hours PRN Shortness of Breath and/or Wheezing  dextrose Oral Gel 15 Gram(s) Oral once PRN Blood Glucose LESS THAN 70 milliGRAM(s)/deciliter  ondansetron Injectable 4 milliGRAM(s) IV Push every 8 hours PRN Nausea and/or Vomiting      Allergies    sulfa drugs (Unknown)    Intolerances        REVIEW OF SYSTEMS: Negative except as per HPI.    VITAL SIGNS:   Vital Signs Last 24 Hrs  T(C): 37.2 (30 Oct 2024 04:57), Max: 38.3 (30 Oct 2024 01:16)  T(F): 99 (30 Oct 2024 04:57), Max: 100.9 (30 Oct 2024 01:16)  HR: 101 (30 Oct 2024 04:57) (101 - 122)  BP: 128/74 (30 Oct 2024 04:57) (118/71 - 171/84)  BP(mean): --  RR: 18 (30 Oct 2024 04:57) (16 - 18)  SpO2: 95% (30 Oct 2024 04:57) (94% - 96%)    Parameters below as of 30 Oct 2024 04:57  Patient On (Oxygen Delivery Method): room air        I&O's Summary      PHYSICAL EXAM:  Constitutional: NAD, well-developed  HEENT NC/AT, moist mucous membranes  Pulmonary: Non-labored, breath sounds are clear bilaterally, no wheezing, rales or rhonchi  Cardiovascular: +S1, S2, RRR, no murmur  Gastrointestinal: Soft, nontender, nondistended, normoactive bowel sounds  Extremities: No peripheral edema   Neurological: Alert, strength and sensitivity are grossly intact  Skin: No obvious lesions/rashes  Psych: Mood & affect appropriate    LABS: All Labs Reviewed:                        9.6    25.13 )-----------( 329      ( 30 Oct 2024 05:15 )             29.7                         11.1   21.12 )-----------( 368      ( 29 Oct 2024 18:20 )             34.1     30 Oct 2024 05:15    137    |  104    |  22     ----------------------------<  152    3.6     |  25     |  1.30   29 Oct 2024 18:20    135    |  101    |  26     ----------------------------<  142    3.7     |  25     |  1.30     Ca    8.5        30 Oct 2024 05:15  Ca    9.0        29 Oct 2024 18:20  Phos  2.5       30 Oct 2024 05:15  Mg     1.8       30 Oct 2024 05:15    TPro  8.0    /  Alb  3.2    /  TBili  1.0    /  DBili  x      /  AST  21     /  ALT  25     /  AlkPhos  51     29 Oct 2024 18:20    PT/INR - ( 30 Oct 2024 05:15 )   PT: 14.3 sec;   INR: 1.21 ratio         PTT - ( 30 Oct 2024 05:15 )  PTT:31.9 sec      Blood Culture:         EKG:  from 10/29/24: sinus tachycardia with frequent PVCs, possible LA enlargement, nonspecific ST and T wave abnormality. , rate 106, Qtc 454    RADIOLOGY:  ACC: [id number] EXAM:  CT ABDOMEN AND PELVIS IC   ORDERED BY: ESPERANZA MOLINA     PROCEDURE DATE:  10/29/2024          INTERPRETATION:  CLINICAL INFORMATION: Right lower quadrant pain. Fever.    COMPARISON: None.    CONTRAST/COMPLICATIONS:  IV Contrast: Omnipaque 350  90 cc administered   10 cc discarded  Oral Contrast: NONE  Complications: None reported at time of study completion    PROCEDURE:  CT of the Abdomen and Pelvis was performed.  Sagittal and coronal reformats were performed.    FINDINGS:  LOWER CHEST: Bibasilar subsegmental atelectasis    LIVER: Hepatic steatosis.  BILE DUCTS: Normal caliber.  GALLBLADDER: Within normal limits.  SPLEEN: Within normal limits.  PANCREAS: Within normal limits.  ADRENALS: Indeterminant left adrenal 2.0 cm nodule.  KIDNEYS/URETERS: Bilateral renal subcentimeter hypodense foci, too small   to characterize. No hydronephrosis. Symmetric renal enhancement.    BLADDER: Within normal limits.  REPRODUCTIVE ORGANS: Normal uterus.    BOWEL: Colonic diverticulosis. Mural thickening of the sigmoid colon with   fat stranding. There is associated right adnexal abscess measuring 3.6 x   2.7 cm (301/92). Normal appendix.  No bowel obstruction.  PERITONEUM/RETROPERITONEUM: Small pneumoperitoneum.  VESSELS: Atherosclerotic changes.  LYMPH NODES: No lymphadenopathy.  ABDOMINAL WALL: Within normal limits.  BONES: Degenerative changes.    IMPRESSION:  Acute sigmoid diverticulitis with associated right adnexal abscess and   perforation evidenced by small pneumoperitoneum.  After resolution of acute infection/inflammation, follow-up colonoscopy   can be considered to rule out underlying lesion.    Findings were discussed with PHOEBE MOLINA [id number] 10/29/2024   8:38 PM by Dr. Rodríguez with read back confirmation.    Indeterminant left adrenal 2.0 cm nodule. Consider adrenal CT in 12   months.    SHAHRIAR RODRÍGUEZ MD; Attending Radiologist  This document has been electronically signed. Oct 29 2024  8:41PM      CXR: IMPRESSION: No acute cardiopulmonary disease process. Cardiomegaly.   [facility]         Juliet Garrett Patel, Devyn, Joe      [phone] (office)    Reason for Consult: Edema     Interval HPI: Patient seen and examined at bedside. No acute events overnight. Pt still remains NPO. States she is unable to walk 1 block without being short of breath.     She had a stress test which was abnormal, and subsequently a cardiac catheterization, which demonstrated no CAD back in 2017. On Prior CT Her calcium score was 340, though there was no significant stenosis. Her cardiologist, Dr. Shepard began her on spironolactone in January 2024. Spironolactone was discontinued in approx June by nephrologist due to impaired renal function determined via increased creatinine. In July 2024 she was hospitalized, it was suggested that she was dehydrated, and her creatinine was 1.8. Her diuretics were stopped. She was supposed to follow up with Dr. Shepard in 1 month for TTE.    Outpatient cardiologist: Dr. Shepard       HPI:  79 year old female with PMHx HTN, well-controlled asthma, HLD, prediabetes presenting to [place] ED with abdominal pain.  Patient states abdominal pain started about 1 week ago.  She locates pain to RLQ, states is sharp in nature and at time of onset was 7/10 in intensity.  Over the past week, patient states pain has increased to now 10/10.  Patient saw PCP earlier today who advised come to ED for further evaluation.  Patient also notes fever 102 taken at home prior to arrival.  Patient states pain has radiated now into LLQ.  Patient denies change in bowel habits, chills, n/v, PO intolerance, chest pain, SOB, calf pain, palpitations.   (30 Oct 2024 00:43)      PAST MEDICAL & SURGICAL HISTORY:  Hypertension      Hyperlipidemia      Asthma, well controlled      Prediabetes      No significant past surgical history          SOCIAL HISTORY: No active tobacco, alcohol or illicit drug use    FAMILY HISTORY:  No pertinent family history in first degree relatives        Home Medications:  Albuterol (Eqv-Proventil HFA) 90 mcg/inh inhalation aerosol: 2 inhaled as needed for  bronchospasm (30 Oct 2024 01:03)  atorvastatin 10 mg oral tablet: 1 tab(s) orally once a day (at bedtime) (30 Oct 2024 01:03)  fenofibrate 160 mg oral tablet: 1 tab(s) orally once a day (30 Oct 2024 01:03)  hydroCHLOROthiazide 25 mg oral tablet: 1 tab(s) orally once a day (30 Oct 2024 01:03)  metoprolol tartrate 100 mg oral tablet: 1 tab(s) orally 3 times a day (30 Oct 2024 01:02)  montelukast 10 mg oral tablet: 1 tab(s) orally once a day (at bedtime) (30 Oct 2024 01:03)  valsartan 320 mg oral tablet: 1 tab(s) orally once a day (30 Oct 2024 01:02)      MEDICATIONS  (STANDING):  dextrose 5% + sodium chloride 0.9%. 1000 milliLiter(s) (125 mL/Hr) IV Continuous <Continuous>  dextrose 5%. 1000 milliLiter(s) (50 mL/Hr) IV Continuous <Continuous>  dextrose 5%. 1000 milliLiter(s) (100 mL/Hr) IV Continuous <Continuous>  dextrose 50% Injectable 25 Gram(s) IV Push once  dextrose 50% Injectable 12.5 Gram(s) IV Push once  dextrose 50% Injectable 25 Gram(s) IV Push once  glucagon  Injectable 1 milliGRAM(s) IntraMuscular once  heparin   Injectable 5000 Unit(s) SubCutaneous every 8 hours  influenza  Vaccine (HIGH DOSE) 0.5 milliLiter(s) IntraMuscular once  insulin lispro (ADMELOG) corrective regimen sliding scale   SubCutaneous every 6 hours  metoprolol tartrate Injectable 5 milliGRAM(s) IV Push every 6 hours  piperacillin/tazobactam IVPB.. 3.375 Gram(s) IV Intermittent every 8 hours    MEDICATIONS  (PRN):  albuterol    90 MICROgram(s) HFA Inhaler 1 Puff(s) Inhalation every 8 hours PRN Shortness of Breath and/or Wheezing  dextrose Oral Gel 15 Gram(s) Oral once PRN Blood Glucose LESS THAN 70 milliGRAM(s)/deciliter  ondansetron Injectable 4 milliGRAM(s) IV Push every 8 hours PRN Nausea and/or Vomiting      Allergies    sulfa drugs (Unknown)    Intolerances        REVIEW OF SYSTEMS: Negative except as per HPI.    VITAL SIGNS:   Vital Signs Last 24 Hrs  T(C): 37.2 (30 Oct 2024 04:57), Max: 38.3 (30 Oct 2024 01:16)  T(F): 99 (30 Oct 2024 04:57), Max: 100.9 (30 Oct 2024 01:16)  HR: 101 (30 Oct 2024 04:57) (101 - 122)  BP: 128/74 (30 Oct 2024 04:57) (118/71 - 171/84)  BP(mean): --  RR: 18 (30 Oct 2024 04:57) (16 - 18)  SpO2: 95% (30 Oct 2024 04:57) (94% - 96%)    Parameters below as of 30 Oct 2024 04:57  Patient On (Oxygen Delivery Method): room air        I&O's Summary      PHYSICAL EXAM:  Constitutional: NAD, well-developed  HEENT NC/AT, moist mucous membranes  Pulmonary: Non-labored, breath sounds are clear bilaterally, no wheezing, rales or rhonchi  Cardiovascular: +S1, S2, RRR, no murmur  Gastrointestinal: Soft, nontender, nondistended, normoactive bowel sounds  Extremities: No peripheral edema   Neurological: Alert, strength and sensitivity are grossly intact  Skin: No obvious lesions/rashes  Psych: Mood & affect appropriate    LABS: All Labs Reviewed:                        9.6    25.13 )-----------( 329      ( 30 Oct 2024 05:15 )             29.7                         11.1   21.12 )-----------( 368      ( 29 Oct 2024 18:20 )             34.1     30 Oct 2024 05:15    137    |  104    |  22     ----------------------------<  152    3.6     |  25     |  1.30   29 Oct 2024 18:20    135    |  101    |  26     ----------------------------<  142    3.7     |  25     |  1.30     Ca    8.5        30 Oct 2024 05:15  Ca    9.0        29 Oct 2024 18:20  Phos  2.5       30 Oct 2024 05:15  Mg     1.8       30 Oct 2024 05:15    TPro  8.0    /  Alb  3.2    /  TBili  1.0    /  DBili  x      /  AST  21     /  ALT  25     /  AlkPhos  51     29 Oct 2024 18:20    PT/INR - ( 30 Oct 2024 05:15 )   PT: 14.3 sec;   INR: 1.21 ratio         PTT - ( 30 Oct 2024 05:15 )  PTT:31.9 sec      Blood Culture:         EKG:  from 10/29/24: sinus tachycardia with frequent PVCs, possible LA enlargement, nonspecific ST and T wave abnormality. , rate 106, Qtc 454    RADIOLOGY:  ACC: [id number] EXAM:  CT ABDOMEN AND PELVIS IC   ORDERED BY: ESPERANZA MOLIAN     PROCEDURE DATE:  10/29/2024          INTERPRETATION:  CLINICAL INFORMATION: Right lower quadrant pain. Fever.    COMPARISON: None.    CONTRAST/COMPLICATIONS:  IV Contrast: Omnipaque 350  90 cc administered   10 cc discarded  Oral Contrast: NONE  Complications: None reported at time of study completion    PROCEDURE:  CT of the Abdomen and Pelvis was performed.  Sagittal and coronal reformats were performed.    FINDINGS:  LOWER CHEST: Bibasilar subsegmental atelectasis    LIVER: Hepatic steatosis.  BILE DUCTS: Normal caliber.  GALLBLADDER: Within normal limits.  SPLEEN: Within normal limits.  PANCREAS: Within normal limits.  ADRENALS: Indeterminant left adrenal 2.0 cm nodule.  KIDNEYS/URETERS: Bilateral renal subcentimeter hypodense foci, too small   to characterize. No hydronephrosis. Symmetric renal enhancement.    BLADDER: Within normal limits.  REPRODUCTIVE ORGANS: Normal uterus.    BOWEL: Colonic diverticulosis. Mural thickening of the sigmoid colon with   fat stranding. There is associated right adnexal abscess measuring 3.6 x   2.7 cm (301/92). Normal appendix.  No bowel obstruction.  PERITONEUM/RETROPERITONEUM: Small pneumoperitoneum.  VESSELS: Atherosclerotic changes.  LYMPH NODES: No lymphadenopathy.  ABDOMINAL WALL: Within normal limits.  BONES: Degenerative changes.    IMPRESSION:  Acute sigmoid diverticulitis with associated right adnexal abscess and   perforation evidenced by small pneumoperitoneum.  After resolution of acute infection/inflammation, follow-up colonoscopy   can be considered to rule out underlying lesion.    Findings were discussed with PHOEBE MOLINA [id number] 10/29/2024   8:38 PM by Dr. Rodríguez with read back confirmation.    Indeterminant left adrenal 2.0 cm nodule. Consider adrenal CT in 12   months.    SHAHRIAR RODRÍGUEZ MD; Attending Radiologist  This document has been electronically signed. Oct 29 2024  8:41PM      CXR: IMPRESSION: No acute cardiopulmonary disease process. Cardiomegaly.    Prior outpatient echo from 1/18/24: Left ventricular systolic function is normal with an ejection fraction of 67 % by Soto's method of   disks. 2. Eccentric left ventricular hypertrophy (dilated left ventricle with normal relative wall thickness). 3. Left ventricular diastolic dysfunction function of indeterminate grade.  4. Normal right ventricular cavity size and normal systolic function. 5. The left atrium is mildly dilated. 6.There is calcification of the mitral valve annulus.  7. Borderline posterior MVP. 8. Trace mitral regurgitation. 9.There is mild calcification of the aortic valve leaflets. 10.Mild tricuspid regurgitation     [facility]         Juliet Garrett Patel, Devyn, Joe      [phone] (office)    Reason for Consult: Edema     Interval HPI: Patient seen and examined at bedside. No acute events overnight. Pt still remains NPO. Admits to abdominal pain. States she is unable to walk 1 block without being short of breath.     She had a stress test which was abnormal, and subsequently a cardiac catheterization, which demonstrated no CAD back in 2017. On Prior CT Her calcium score was 340, though there was no significant stenosis. Her cardiologist, Dr. Shepard began her on spironolactone in January 2024. Spironolactone was discontinued in approx June by nephrologist due to impaired renal function determined via increased creatinine. In July 2024 she was hospitalized, it was suggested that she was dehydrated, and her creatinine was 1.8. Her diuretics were stopped. She was supposed to follow up with Dr. Shepard in 1 month for TTE.    Outpatient cardiologist: Dr. Shepard       HPI:  79 year old female with PMHx HTN, well-controlled asthma, HLD, prediabetes presenting to [place] ED with abdominal pain.  Patient states abdominal pain started about 1 week ago.  She locates pain to RLQ, states is sharp in nature and at time of onset was 7/10 in intensity.  Over the past week, patient states pain has increased to now 10/10.  Patient saw PCP earlier today who advised come to ED for further evaluation.  Patient also notes fever 102 taken at home prior to arrival.  Patient states pain has radiated now into LLQ.  Patient denies change in bowel habits, chills, n/v, PO intolerance, chest pain, SOB, calf pain, palpitations.   (30 Oct 2024 00:43)      PAST MEDICAL & SURGICAL HISTORY:  Hypertension      Hyperlipidemia      Asthma, well controlled      Prediabetes      No significant past surgical history          SOCIAL HISTORY: No active tobacco, alcohol or illicit drug use    FAMILY HISTORY:  No pertinent family history in first degree relatives        Home Medications:  Albuterol (Eqv-Proventil HFA) 90 mcg/inh inhalation aerosol: 2 inhaled as needed for  bronchospasm (30 Oct 2024 01:03)  atorvastatin 10 mg oral tablet: 1 tab(s) orally once a day (at bedtime) (30 Oct 2024 01:03)  fenofibrate 160 mg oral tablet: 1 tab(s) orally once a day (30 Oct 2024 01:03)  hydroCHLOROthiazide 25 mg oral tablet: 1 tab(s) orally once a day (30 Oct 2024 01:03)  metoprolol tartrate 100 mg oral tablet: 1 tab(s) orally 3 times a day (30 Oct 2024 01:02)  montelukast 10 mg oral tablet: 1 tab(s) orally once a day (at bedtime) (30 Oct 2024 01:03)  valsartan 320 mg oral tablet: 1 tab(s) orally once a day (30 Oct 2024 01:02)      MEDICATIONS  (STANDING):  dextrose 5% + sodium chloride 0.9%. 1000 milliLiter(s) (125 mL/Hr) IV Continuous <Continuous>  dextrose 5%. 1000 milliLiter(s) (50 mL/Hr) IV Continuous <Continuous>  dextrose 5%. 1000 milliLiter(s) (100 mL/Hr) IV Continuous <Continuous>  dextrose 50% Injectable 25 Gram(s) IV Push once  dextrose 50% Injectable 12.5 Gram(s) IV Push once  dextrose 50% Injectable 25 Gram(s) IV Push once  glucagon  Injectable 1 milliGRAM(s) IntraMuscular once  heparin   Injectable 5000 Unit(s) SubCutaneous every 8 hours  influenza  Vaccine (HIGH DOSE) 0.5 milliLiter(s) IntraMuscular once  insulin lispro (ADMELOG) corrective regimen sliding scale   SubCutaneous every 6 hours  metoprolol tartrate Injectable 5 milliGRAM(s) IV Push every 6 hours  piperacillin/tazobactam IVPB.. 3.375 Gram(s) IV Intermittent every 8 hours    MEDICATIONS  (PRN):  albuterol    90 MICROgram(s) HFA Inhaler 1 Puff(s) Inhalation every 8 hours PRN Shortness of Breath and/or Wheezing  dextrose Oral Gel 15 Gram(s) Oral once PRN Blood Glucose LESS THAN 70 milliGRAM(s)/deciliter  ondansetron Injectable 4 milliGRAM(s) IV Push every 8 hours PRN Nausea and/or Vomiting      Allergies    sulfa drugs (Unknown)    Intolerances        REVIEW OF SYSTEMS: Negative except as per HPI.    VITAL SIGNS:   Vital Signs Last 24 Hrs  T(C): 37.2 (30 Oct 2024 04:57), Max: 38.3 (30 Oct 2024 01:16)  T(F): 99 (30 Oct 2024 04:57), Max: 100.9 (30 Oct 2024 01:16)  HR: 101 (30 Oct 2024 04:57) (101 - 122)  BP: 128/74 (30 Oct 2024 04:57) (118/71 - 171/84)  BP(mean): --  RR: 18 (30 Oct 2024 04:57) (16 - 18)  SpO2: 95% (30 Oct 2024 04:57) (94% - 96%)    Parameters below as of 30 Oct 2024 04:57  Patient On (Oxygen Delivery Method): room air        I&O's Summary      PHYSICAL EXAM:  Constitutional: NAD, well-developed  HEENT NC/AT, moist mucous membranes  Pulmonary: Non-labored, breath sounds are clear bilaterally, no wheezing, rales or rhonchi  Cardiovascular: +S1, S2, RRR  Gastrointestinal: Soft, normoactive bowel sounds +distended  Extremities: No peripheral edema   Neurological: Alert, awake, answers questions appropriately  Psych: Mood & affect appropriate    LABS: All Labs Reviewed:                        9.6    25.13 )-----------( 329      ( 30 Oct 2024 05:15 )             29.7                         11.1   21.12 )-----------( 368      ( 29 Oct 2024 18:20 )             34.1     30 Oct 2024 05:15    137    |  104    |  22     ----------------------------<  152    3.6     |  25     |  1.30   29 Oct 2024 18:20    135    |  101    |  26     ----------------------------<  142    3.7     |  25     |  1.30     Ca    8.5        30 Oct 2024 05:15  Ca    9.0        29 Oct 2024 18:20  Phos  2.5       30 Oct 2024 05:15  Mg     1.8       30 Oct 2024 05:15    TPro  8.0    /  Alb  3.2    /  TBili  1.0    /  DBili  x      /  AST  21     /  ALT  25     /  AlkPhos  51     29 Oct 2024 18:20    PT/INR - ( 30 Oct 2024 05:15 )   PT: 14.3 sec;   INR: 1.21 ratio         PTT - ( 30 Oct 2024 05:15 )  PTT:31.9 sec      Blood Culture:         EKG:  from 10/29/24: sinus tachycardia with frequent PVCs, possible LA enlargement, nonspecific ST and T wave abnormality. , rate 106, Qtc 454    RADIOLOGY:  ACC: [id number] EXAM:  CT ABDOMEN AND PELVIS IC   ORDERED BY: ESPERANZA MOLINA     PROCEDURE DATE:  10/29/2024          INTERPRETATION:  CLINICAL INFORMATION: Right lower quadrant pain. Fever.    COMPARISON: None.    CONTRAST/COMPLICATIONS:  IV Contrast: Omnipaque 350  90 cc administered   10 cc discarded  Oral Contrast: NONE  Complications: None reported at time of study completion    PROCEDURE:  CT of the Abdomen and Pelvis was performed.  Sagittal and coronal reformats were performed.    FINDINGS:  LOWER CHEST: Bibasilar subsegmental atelectasis    LIVER: Hepatic steatosis.  BILE DUCTS: Normal caliber.  GALLBLADDER: Within normal limits.  SPLEEN: Within normal limits.  PANCREAS: Within normal limits.  ADRENALS: Indeterminant left adrenal 2.0 cm nodule.  KIDNEYS/URETERS: Bilateral renal subcentimeter hypodense foci, too small   to characterize. No hydronephrosis. Symmetric renal enhancement.    BLADDER: Within normal limits.  REPRODUCTIVE ORGANS: Normal uterus.    BOWEL: Colonic diverticulosis. Mural thickening of the sigmoid colon with   fat stranding. There is associated right adnexal abscess measuring 3.6 x   2.7 cm (301/92). Normal appendix.  No bowel obstruction.  PERITONEUM/RETROPERITONEUM: Small pneumoperitoneum.  VESSELS: Atherosclerotic changes.  LYMPH NODES: No lymphadenopathy.  ABDOMINAL WALL: Within normal limits.  BONES: Degenerative changes.    IMPRESSION:  Acute sigmoid diverticulitis with associated right adnexal abscess and   perforation evidenced by small pneumoperitoneum.  After resolution of acute infection/inflammation, follow-up colonoscopy   can be considered to rule out underlying lesion.    Findings were discussed with PHOEBE MOLINA [id number] 10/29/2024   8:38 PM by Dr. Rodríguez with read back confirmation.    Indeterminant left adrenal 2.0 cm nodule. Consider adrenal CT in 12   months.    SHAHRIAR RODRÍGUEZ MD; Attending Radiologist  This document has been electronically signed. Oct 29 2024  8:41PM      CXR: IMPRESSION: No acute cardiopulmonary disease process. Cardiomegaly.    Prior outpatient echo from 1/18/24: Left ventricular systolic function is normal with an ejection fraction of 67 % by Soto's method of   disks. 2. Eccentric left ventricular hypertrophy (dilated left ventricle with normal relative wall thickness). 3. Left ventricular diastolic dysfunction function of indeterminate grade.  4. Normal right ventricular cavity size and normal systolic function. 5. The left atrium is mildly dilated. 6.There is calcification of the mitral valve annulus.  7. Borderline posterior MVP. 8. Trace mitral regurgitation. 9.There is mild calcification of the aortic valve leaflets. 10.Mild tricuspid regurgitation

## 2024-10-30 NOTE — H&P ADULT - PROBLEM SELECTOR PLAN 1
- Given patients clinical presentation including physical exam and radiographic findings, admission to Dr. Morales   - NPO/IVF  - IV Abx; zosyn  - Monitor for bowel function  - Serial abdominal exams  - VTE PPx w/ HSQ  - At this time patient remains surgically stable, will monitor patient closely   - Discussed with Dr. Morales

## 2024-10-30 NOTE — CONSULT NOTE ADULT - PROBLEM SELECTOR RECOMMENDATION 2
Chronic, on home HCTZ 25mg qd, Valsartan 320mg qd, Metoprolol tartrate 100mg TID  - hold home oral meds while NPO  - on 5mg lopressor q6h with hold parameters  - monitor hemodynamics  - restart home PO meds when sx clears for diet Patient with 1+ b/l LE edema  - Outpatient cardiologist is Dr. Shepard - unknown if hx of CHF.  - states she is unable to walk 1 block without being short of breath  - Baseline functional capacity is < 4 METS  - f/u TTE  - Cardio, [facility], consulted, f/u recs

## 2024-10-30 NOTE — CASE MANAGEMENT PROGRESS NOTE - NSCMPROGRESSNOTE_GEN_ALL_CORE
CM attempted to meet with pt to complete CCA, pt is currently in the OR. CM will continue to collaborate with interdisciplinary team and remain available to assist.

## 2024-10-30 NOTE — PATIENT PROFILE ADULT - FALL HARM RISK - UNIVERSAL INTERVENTIONS
Bed in lowest position, wheels locked, appropriate side rails in place/Call bell, personal items and telephone in reach/Instruct patient to call for assistance before getting out of bed or chair/Non-slip footwear when patient is out of bed/[place] to call system/Physically safe environment - no spills, clutter or unnecessary equipment/Purposeful Proactive Rounding/Room/bathroom lighting operational, light cord in reach

## 2024-10-30 NOTE — H&P ADULT - HISTORY OF PRESENT ILLNESS
79 year old female with PMHx HTN, well-controlled asthma, HLD presenting to [place] ED with abdominal pain.  Patient states abdominal pain started about 1 week ago.  She locates pain to RLQ, states is sharp in nature and at time of onset was 7/10 in intensity.  Over the past week, patient states pain has increased to now 10/10.  Patient saw PCP earlier today who advised come to ED for further evaluation.  Patient also notes fever 102 taken at home prior to arrival.  Patient states pain has radiated now into LLQ.  Patient denies change in bowel habits, chills, n/v, PO intolerance, chest pain, SOB, calf pain, palpitations.

## 2024-10-30 NOTE — CONSULT NOTE ADULT - REASON FOR ADMISSION
diverticulitis w/ perforation and abscess

## 2024-10-30 NOTE — CONSULT NOTE ADULT - SUBJECTIVE AND OBJECTIVE BOX
INFECTIOUS DISEASES   [address] [place] [address]  Tel: [phone]     Fax: [phone]  ========================================================  MD Leodan Moura Michelle, MD Shah, Kaushal, MD Sunjit, Jaspal, MD Sehrish Shahid, MD   ========================================================    N-[id number]  VISHAL FULTON     CC: Patient is a 79y old  Female who presents with a chief complaint of diverticulitis w/ perforation and abscess (30 Oct 2024 07:43)    HPI:  79 year old female with PMHx HTN, well-controlled asthma, HLD presenting to [place] ED with abdominal pain.  Patient states abdominal pain started about 1 week ago.  She locates pain to RLQ, states is sharp in nature and at time of onset was 7/10 in intensity.  Over the past week, patient states pain has increased to now 10/10.  Patient saw PCP earlier today who advised come to ED for further evaluation.  Patient also notes fever 102 taken at home prior to arrival.  Patient states pain has radiated now into LLQ.  Patient denies change in bowel habits, chills, n/v, PO intolerance, chest pain, SOB, calf pain, palpitations.   (30 Oct 2024 00:43)      PAST MEDICAL & SURGICAL HISTORY:  Hypertension      Hyperlipidemia      Asthma, well controlled      Prediabetes      No significant past surgical history          Social Hx: No current smoking, EtOH or drugs     FAMILY HISTORY:  No pertinent family history in first degree relatives    Noncontributory     Allergies    sulfa drugs (Unknown)    Intolerances        MEDICATIONS  (STANDING):  dextrose 5% + sodium chloride 0.9%. 1000 milliLiter(s) (125 mL/Hr) IV Continuous <Continuous>  dextrose 5%. 1000 milliLiter(s) (100 mL/Hr) IV Continuous <Continuous>  dextrose 5%. 1000 milliLiter(s) (50 mL/Hr) IV Continuous <Continuous>  dextrose 50% Injectable 12.5 Gram(s) IV Push once  dextrose 50% Injectable 25 Gram(s) IV Push once  dextrose 50% Injectable 25 Gram(s) IV Push once  glucagon  Injectable 1 milliGRAM(s) IntraMuscular once  heparin   Injectable 5000 Unit(s) SubCutaneous every 8 hours  influenza  Vaccine (HIGH DOSE) 0.5 milliLiter(s) IntraMuscular once  insulin lispro (ADMELOG) corrective regimen sliding scale   SubCutaneous every 6 hours  metoprolol tartrate Injectable 5 milliGRAM(s) IV Push every 6 hours  piperacillin/tazobactam IVPB.. 3.375 Gram(s) IV Intermittent every 8 hours    MEDICATIONS  (PRN):  albuterol    90 MICROgram(s) HFA Inhaler 1 Puff(s) Inhalation every 8 hours PRN Shortness of Breath and/or Wheezing  dextrose Oral Gel 15 Gram(s) Oral once PRN Blood Glucose LESS THAN 70 milliGRAM(s)/deciliter  ondansetron Injectable 4 milliGRAM(s) IV Push every 8 hours PRN Nausea and/or Vomiting       REVIEW OF SYSTEMS:  CONSTITUTIONAL:  No Fever or chills  HEENT:  No diplopia or blurred vision.  No sore throat or runny nose.  CARDIOVASCULAR:  No chest pain   RESPIRATORY:  No cough, shortness of breath, PND or orthopnea.  GASTROINTESTINAL:  No nausea, vomiting or diarrhea.  GENITOURINARY:  No dysuria, frequency or urgency. No Blood in urine  MUSCULOSKELETAL:  no joint aches, no muscle pain  SKIN:  No change in skin, hair or nails.    Physical Exam:  Vital Signs Last 24 Hrs  T(C): 37.2 (30 Oct 2024 04:57), Max: 38.3 (30 Oct 2024 01:16)  T(F): 99 (30 Oct 2024 04:57), Max: 100.9 (30 Oct 2024 01:16)  HR: 101 (30 Oct 2024 04:57) (101 - 122)  BP: 128/74 (30 Oct 2024 04:57) (118/71 - 171/84)  BP(mean): --  RR: 18 (30 Oct 2024 04:57) (16 - 18)  SpO2: 95% (30 Oct 2024 04:57) (94% - 96%)    Parameters below as of 30 Oct 2024 04:57  Patient On (Oxygen Delivery Method): room air      Height (cm): 162.6 (10-29 @ 16:59)  Weight (kg): 93.9 (10-29 @ 16:59)  BMI (kg/m2): 35.5 (10-29 @ 16:59)  BSA (m2): 1.99 (10-29 @ 16:59)  GEN: NAD  HEENT: normocephalic and atraumatic. EOMI. PERRL.    NECK: Supple.  No lymphadenopathy   LUNGS: Clear to auscultation.  HEART: Regular rate and rhythm without murmur.  ABDOMEN: Soft, nontender, and nondistended.  Positive bowel sounds.    : No CVA tenderness  EXTREMITIES: Without edema.  NEUROLOGIC: grossly intact.  PSYCHIATRIC: Appropriate affect .  SKIN: No rash     Labs:  10-30    137  |  104  |  22  ----------------------------<  152[H]  3.6   |  25  |  1.30    Ca    8.5      30 Oct 2024 05:15  Phos  2.5     10-30  Mg     1.8     10-30    TPro  8.0  /  Alb  3.2[L]  /  TBili  1.0  /  DBili  x   /  AST  21  /  ALT  25  /  AlkPhos  51  10-29                          9.6    25.13 )-----------( 329      ( 30 Oct 2024 05:15 )             29.7     PT/INR - ( 30 Oct 2024 05:15 )   PT: 14.3 sec;   INR: 1.21 ratio         PTT - ( 30 Oct 2024 05:15 )  PTT:31.9 sec  Urinalysis Basic - ( 30 Oct 2024 05:15 )    Color: x / Appearance: x / SG: x / pH: x  Gluc: 152 mg/dL / Ketone: x  / Bili: x / Urobili: x   Blood: x / Protein: x / Nitrite: x   Leuk Esterase: x / RBC: x / WBC x   Sq Epi: x / Non Sq Epi: x / Bacteria: x      LIVER FUNCTIONS - ( 29 Oct 2024 18:20 )  Alb: 3.2 g/dL / Pro: 8.0 g/dL / ALK PHOS: 51 U/L / ALT: 25 U/L / AST: 21 U/L / GGT: x                           RECENT CULTURES:        All imaging and other data have been reviewed.      Assessment and Plan:       Thank you for courtesy of this consult.     Will follow.  Discussed with the primary team.     Tremayne Schwartz MD  Division of Infectious Diseases   Please call ID service at [phone] with any question.    75 minutes spent on total encounter assessing patient, examination, chart review, counseling and coordinating care by the attending physician/nurse/care manager.   [facility]  INFECTIOUS DISEASES   [address] [place] [address]  Tel: [phone]     Fax: [phone]  ========================================================  MD Leodan Moura Michelle, MD Shah, Kaushal, MD Sunjit, Jaspal, MD Sehrish Shahid, MD   ========================================================    MRN-[id number]  VISHAL FULTON     CC: Patient is a 79y old  Female who presents with a chief complaint of diverticulitis w/ perforation and abscess (30 Oct 2024 07:43)    HPI:  79 year old female with PMHx HTN, well-controlled asthma, HLD presenting to [place] ED with abdominal pain.  Patient states abdominal pain started about 1 week ago.  She locates pain to RLQ, states is sharp in nature and at time of onset was 7/10 in intensity.  Over the past week, patient states pain has increased to now 10/10.  Patient saw PCP earlier today who advised come to ED for further evaluation.  Patient also notes fever 102 taken at home prior to arrival.  Patient states pain has radiated now into LLQ.  Patient denies change in bowel habits, chills, n/v, PO intolerance, chest pain, SOB, calf pain, palpitations.   (30 Oct 2024 00:43)    PAST MEDICAL & SURGICAL HISTORY:  Hypertension  Hyperlipidemia  Asthma, well controlled  Prediabetes  No significant past surgical history    Social Hx: No current smoking, EtOH or drugs     FAMILY HISTORY:  No pertinent family history in first degree relatives    Allergies  sulfa drugs (Unknown)    MEDICATIONS  (STANDING):  dextrose 5% + sodium chloride 0.9%. 1000 milliLiter(s) (125 mL/Hr) IV Continuous <Continuous>  dextrose 5%. 1000 milliLiter(s) (100 mL/Hr) IV Continuous <Continuous>  dextrose 5%. 1000 milliLiter(s) (50 mL/Hr) IV Continuous <Continuous>  dextrose 50% Injectable 12.5 Gram(s) IV Push once  dextrose 50% Injectable 25 Gram(s) IV Push once  dextrose 50% Injectable 25 Gram(s) IV Push once  glucagon  Injectable 1 milliGRAM(s) IntraMuscular once  heparin   Injectable 5000 Unit(s) SubCutaneous every 8 hours  influenza  Vaccine (HIGH DOSE) 0.5 milliLiter(s) IntraMuscular once  insulin lispro (ADMELOG) corrective regimen sliding scale   SubCutaneous every 6 hours  metoprolol tartrate Injectable 5 milliGRAM(s) IV Push every 6 hours  piperacillin/tazobactam IVPB.. 3.375 Gram(s) IV Intermittent every 8 hours    MEDICATIONS  (PRN):  albuterol    90 MICROgram(s) HFA Inhaler 1 Puff(s) Inhalation every 8 hours PRN Shortness of Breath and/or Wheezing  dextrose Oral Gel 15 Gram(s) Oral once PRN Blood Glucose LESS THAN 70 milliGRAM(s)/deciliter  ondansetron Injectable 4 milliGRAM(s) IV Push every 8 hours PRN Nausea and/or Vomiting     REVIEW OF SYSTEMS:  CONSTITUTIONAL:  No Fever or chills  HEENT:  No diplopia or blurred vision.  No sore throat or runny nose.  CARDIOVASCULAR:  No chest pain   RESPIRATORY:  No cough, shortness of breath, PND or orthopnea.  GASTROINTESTINAL:  No nausea, vomiting or diarrhea. + abd pain  GENITOURINARY:  No dysuria, frequency or urgency. No Blood in urine  MUSCULOSKELETAL:  no joint aches, no muscle pain  SKIN:  No change in skin, hair or nails.    Physical Exam:  Vital Signs Last 24 Hrs  T(C): 37.2 (30 Oct 2024 04:57), Max: 38.3 (30 Oct 2024 01:16)  T(F): 99 (30 Oct 2024 04:57), Max: 100.9 (30 Oct 2024 01:16)  HR: 101 (30 Oct 2024 04:57) (101 - 122)  BP: 128/74 (30 Oct 2024 04:57) (118/71 - 171/84)  BP(mean): --  RR: 18 (30 Oct 2024 04:57) (16 - 18)  SpO2: 95% (30 Oct 2024 04:57) (94% - 96%)  Parameters below as of 30 Oct 2024 04:57  Patient On (Oxygen Delivery Method): room air  Height (cm): 162.6 (10-29 @ 16:59)  Weight (kg): 93.9 (10-29 @ 16:59)  BMI (kg/m2): 35.5 (10-29 @ 16:59)  BSA (m2): 1.99 (10-29 @ 16:59)  GEN: NAD  HEENT: normocephalic and atraumatic.   NECK: Supple.  No lymphadenopathy   LUNGS: Clear to auscultation.  HEART: Regular rate and rhythm   ABDOMEN: Soft, +Lower abd tenderness,   nondistended.  + Colostomy   : No CVA tenderness  EXTREMITIES: Without edema.  NEUROLOGIC: grossly intact.  PSYCHIATRIC: Appropriate affect .  SKIN: No rash     Labs:  10-30    137  |  104  |  22  ----------------------------<  152[H]  3.6   |  25  |  1.30    Ca    8.5      30 Oct 2024 05:15  Phos  2.5     10-30  Mg     1.8     10-30    TPro  8.0  /  Alb  3.2[L]  /  TBili  1.0  /  DBili  x   /  AST  21  /  ALT  25  /  AlkPhos  51  10-29                        9.6    25.13 )-----------( 329      ( 30 Oct 2024 05:15 )             29.7     PT/INR - ( 30 Oct 2024 05:15 )   PT: 14.3 sec;   INR: 1.21 ratio    PTT - ( 30 Oct 2024 05:15 )  PTT:31.9 sec  Urinalysis Basic - ( 30 Oct 2024 05:15 )    Color: x / Appearance: x / SG: x / pH: x  Gluc: 152 mg/dL / Ketone: x  / Bili: x / Urobili: x   Blood: x / Protein: x / Nitrite: x   Leuk Esterase: x / RBC: x / WBC x   Sq Epi: x / Non Sq Epi: x / Bacteria: x    LIVER FUNCTIONS - ( 29 Oct 2024 18:20 )  Alb: 3.2 g/dL / Pro: 8.0 g/dL / ALK PHOS: 51 U/L / ALT: 25 U/L / AST: 21 U/L / GGT: x           All imaging and other data have been reviewed.  < from: CT Abdomen and Pelvis w/ IV Cont (10.29.24 @ 20:21) >  IMPRESSION:  Acute sigmoid diverticulitis with associated right adnexal abscess and   perforation evidenced by small pneumoperitoneum.  After resolution of acute infection/inflammation, follow-up colonoscopy   can be considered to rule out underlying lesion.  Findings were discussed with PHOEBE MOLINA [id number] 10/29/2024   8:38 PM by Dr. Rodríguez with read back confirmation.  Indeterminant left adrenal 2.0 cm nodule. Consider adrenal CT in 12   months.    Assessment and Plan:   79 year old female with PMHx HTN, well-controlled asthma, HLD presenting to [place] ED with abdominal pain.    CT showed a perforated sigmoid colon with diverticulitis and collection. WBC 25k and Tmax 100.9.     s/p Campa procedure today.     # Perforated diverticulitis     - Will follow cultures   - Monitor Tmax and WBC   - Continue zosyn 3.375gm q8 to cover abdominal henrik  - Surgery follow up     Thank you for courtesy of this consult.     Will follow.  Discussed with the primary team.     Tremayne Schwartz MD  Division of Infectious Diseases   Please call ID service at [phone] with any question.    75 minutes spent on total encounter assessing patient, examination, chart review, counseling and coordinating care by the attending physician/nurse/care manager.

## 2024-10-31 LAB
ABO RH CONFIRMATION: SIGNIFICANT CHANGE UP
ANION GAP SERPL CALC-SCNC: 10 MMOL/L — SIGNIFICANT CHANGE UP (ref 5–17)
BASOPHILS # BLD AUTO: 0.02 K/UL — SIGNIFICANT CHANGE UP (ref 0–0.2)
BASOPHILS NFR BLD AUTO: 0.1 % — SIGNIFICANT CHANGE UP (ref 0–2)
BUN SERPL-MCNC: 26 MG/DL — HIGH (ref 7–23)
CALCIUM SERPL-MCNC: 7.7 MG/DL — LOW (ref 8.5–10.1)
CHLORIDE SERPL-SCNC: 104 MMOL/L — SIGNIFICANT CHANGE UP (ref 96–108)
CO2 SERPL-SCNC: 21 MMOL/L — LOW (ref 22–31)
CREAT SERPL-MCNC: 1.5 MG/DL — HIGH (ref 0.5–1.3)
EGFR: 35 ML/MIN/1.73M2 — LOW
EOSINOPHIL # BLD AUTO: 0.01 K/UL — SIGNIFICANT CHANGE UP (ref 0–0.5)
EOSINOPHIL NFR BLD AUTO: 0 % — SIGNIFICANT CHANGE UP (ref 0–6)
GLUCOSE SERPL-MCNC: 106 MG/DL — HIGH (ref 70–99)
HCT VFR BLD CALC: 28.2 % — LOW (ref 34.5–45)
HGB BLD-MCNC: 9.2 G/DL — LOW (ref 11.5–15.5)
IMM GRANULOCYTES NFR BLD AUTO: 0.6 % — SIGNIFICANT CHANGE UP (ref 0–0.9)
LYMPHOCYTES # BLD AUTO: 11.5 % — LOW (ref 13–44)
LYMPHOCYTES # BLD AUTO: 2.34 K/UL — SIGNIFICANT CHANGE UP (ref 1–3.3)
MAGNESIUM SERPL-MCNC: 2 MG/DL — SIGNIFICANT CHANGE UP (ref 1.6–2.6)
MCHC RBC-ENTMCNC: 28.8 PG — SIGNIFICANT CHANGE UP (ref 27–34)
MCHC RBC-ENTMCNC: 32.6 G/DL — SIGNIFICANT CHANGE UP (ref 32–36)
MCV RBC AUTO: 88.4 FL — SIGNIFICANT CHANGE UP (ref 80–100)
MONOCYTES # BLD AUTO: 0.99 K/UL — HIGH (ref 0–0.9)
MONOCYTES NFR BLD AUTO: 4.9 % — SIGNIFICANT CHANGE UP (ref 2–14)
NEUTROPHILS # BLD AUTO: 16.77 K/UL — HIGH (ref 1.8–7.4)
NEUTROPHILS NFR BLD AUTO: 82.9 % — HIGH (ref 43–77)
NRBC # BLD: 0 /100 WBCS — SIGNIFICANT CHANGE UP (ref 0–0)
PHOSPHATE SERPL-MCNC: 3.6 MG/DL — SIGNIFICANT CHANGE UP (ref 2.5–4.5)
PLATELET # BLD AUTO: 267 K/UL — SIGNIFICANT CHANGE UP (ref 150–400)
POTASSIUM SERPL-MCNC: 4.5 MMOL/L — SIGNIFICANT CHANGE UP (ref 3.5–5.3)
POTASSIUM SERPL-SCNC: 4.5 MMOL/L — SIGNIFICANT CHANGE UP (ref 3.5–5.3)
RBC # BLD: 3.19 M/UL — LOW (ref 3.8–5.2)
RBC # FLD: 14.4 % — SIGNIFICANT CHANGE UP (ref 10.3–14.5)
SODIUM SERPL-SCNC: 135 MMOL/L — SIGNIFICANT CHANGE UP (ref 135–145)
WBC # BLD: 20.26 K/UL — HIGH (ref 3.8–10.5)
WBC # FLD AUTO: 20.26 K/UL — HIGH (ref 3.8–10.5)

## 2024-10-31 PROCEDURE — 99232 SBSQ HOSP IP/OBS MODERATE 35: CPT

## 2024-10-31 PROCEDURE — 93010 ELECTROCARDIOGRAM REPORT: CPT

## 2024-10-31 PROCEDURE — 99233 SBSQ HOSP IP/OBS HIGH 50: CPT

## 2024-10-31 PROCEDURE — 93306 TTE W/DOPPLER COMPLETE: CPT | Mod: 26

## 2024-10-31 RX ORDER — ACETAMINOPHEN 500 MG
1000 TABLET ORAL ONCE
Refills: 0 | Status: COMPLETED | OUTPATIENT
Start: 2024-11-01 | End: 2024-11-01

## 2024-10-31 RX ORDER — KETOROLAC TROMETHAMINE 30 MG/ML
15 INJECTION INTRAMUSCULAR; INTRAVENOUS EVERY 6 HOURS
Refills: 0 | Status: DISCONTINUED | OUTPATIENT
Start: 2024-10-31 | End: 2024-11-01

## 2024-10-31 RX ORDER — HYDRALAZINE HYDROCHLORIDE 50 MG/1
10 TABLET, FILM COATED ORAL EVERY 6 HOURS
Refills: 0 | Status: DISCONTINUED | OUTPATIENT
Start: 2024-10-31 | End: 2024-11-01

## 2024-10-31 RX ORDER — ALBUTEROL 90 MCG
2 AEROSOL (GRAM) INHALATION EVERY 6 HOURS
Refills: 0 | Status: DISCONTINUED | OUTPATIENT
Start: 2024-10-31 | End: 2024-11-05

## 2024-10-31 RX ORDER — ENOXAPARIN SODIUM 40MG/0.4ML
40 SYRINGE (ML) SUBCUTANEOUS EVERY 24 HOURS
Refills: 0 | Status: DISCONTINUED | OUTPATIENT
Start: 2024-10-31 | End: 2024-11-05

## 2024-10-31 RX ORDER — ACETAMINOPHEN 500 MG
1000 TABLET ORAL ONCE
Refills: 0 | Status: COMPLETED | OUTPATIENT
Start: 2024-10-31 | End: 2024-10-31

## 2024-10-31 RX ORDER — ONDANSETRON HYDROCHLORIDE 2 MG/ML
4 INJECTION, SOLUTION INTRAMUSCULAR; INTRAVENOUS ONCE
Refills: 0 | Status: COMPLETED | OUTPATIENT
Start: 2024-10-31 | End: 2024-10-31

## 2024-10-31 RX ADMIN — Medication 2 PUFF(S): at 19:17

## 2024-10-31 RX ADMIN — ONDANSETRON HYDROCHLORIDE 4 MILLIGRAM(S): 2 INJECTION, SOLUTION INTRAMUSCULAR; INTRAVENOUS at 06:25

## 2024-10-31 RX ADMIN — PANTOPRAZOLE SODIUM 40 MILLIGRAM(S): 40 TABLET, DELAYED RELEASE ORAL at 11:52

## 2024-10-31 RX ADMIN — KETOROLAC TROMETHAMINE 15 MILLIGRAM(S): 30 INJECTION INTRAMUSCULAR; INTRAVENOUS at 18:16

## 2024-10-31 RX ADMIN — KETOROLAC TROMETHAMINE 15 MILLIGRAM(S): 30 INJECTION INTRAMUSCULAR; INTRAVENOUS at 11:46

## 2024-10-31 RX ADMIN — PIPERACILLIN AND TAZOBACTAM 25 GRAM(S): .5; 4 INJECTION, POWDER, LYOPHILIZED, FOR SOLUTION INTRAVENOUS at 05:36

## 2024-10-31 RX ADMIN — Medication 5 MILLIGRAM(S): at 01:20

## 2024-10-31 RX ADMIN — KETOROLAC TROMETHAMINE 15 MILLIGRAM(S): 30 INJECTION INTRAMUSCULAR; INTRAVENOUS at 10:46

## 2024-10-31 RX ADMIN — Medication 0.5 MILLIGRAM(S): at 08:25

## 2024-10-31 RX ADMIN — Medication 5 MILLIGRAM(S): at 11:52

## 2024-10-31 RX ADMIN — Medication 1000 MILLIGRAM(S): at 18:00

## 2024-10-31 RX ADMIN — Medication 400 MILLIGRAM(S): at 02:32

## 2024-10-31 RX ADMIN — ONDANSETRON HYDROCHLORIDE 4 MILLIGRAM(S): 2 INJECTION, SOLUTION INTRAMUSCULAR; INTRAVENOUS at 09:59

## 2024-10-31 RX ADMIN — Medication 400 MILLIGRAM(S): at 05:36

## 2024-10-31 RX ADMIN — Medication 0.5 MILLIGRAM(S): at 09:00

## 2024-10-31 RX ADMIN — Medication 40 MILLIGRAM(S): at 09:59

## 2024-10-31 RX ADMIN — Medication 5 MILLIGRAM(S): at 18:17

## 2024-10-31 RX ADMIN — PIPERACILLIN AND TAZOBACTAM 25 GRAM(S): .5; 4 INJECTION, POWDER, LYOPHILIZED, FOR SOLUTION INTRAVENOUS at 13:53

## 2024-10-31 RX ADMIN — Medication 400 MILLIGRAM(S): at 18:15

## 2024-10-31 RX ADMIN — KETOROLAC TROMETHAMINE 15 MILLIGRAM(S): 30 INJECTION INTRAMUSCULAR; INTRAVENOUS at 19:00

## 2024-10-31 RX ADMIN — Medication 1000 MILLIGRAM(S): at 12:52

## 2024-10-31 RX ADMIN — PIPERACILLIN AND TAZOBACTAM 25 GRAM(S): .5; 4 INJECTION, POWDER, LYOPHILIZED, FOR SOLUTION INTRAVENOUS at 22:00

## 2024-10-31 RX ADMIN — Medication 400 MILLIGRAM(S): at 11:52

## 2024-10-31 RX ADMIN — Medication 5 MILLIGRAM(S): at 05:36

## 2024-10-31 RX ADMIN — Medication 1000 MILLIGRAM(S): at 19:00

## 2024-10-31 NOTE — PROGRESS NOTE ADULT - SUBJECTIVE AND OBJECTIVE BOX
[facility] -- Gerry Wiseman, Chas Chung, Devyn, Joe Benitez: Office # [phone]    Follow Up:  Edema, HTN    Subjective/Observations: Patient seen and examined. Patient awake, alert, resting in bed. No complaints of chest pain, dyspnea, palpitations or dizziness. No signs of orthopnea or PND. , + NGT and reports irritation because of that.    REVIEW OF SYSTEMS: All other review of systems are negative unless indicated above    PAST MEDICAL & SURGICAL HISTORY:  Hypertension      Hypertension      Hyperlipidemia      Asthma, well controlled      Prediabetes      No significant past surgical history    MEDICATIONS  (STANDING):  acetaminophen   IVPB .. 1000 milliGRAM(s) IV Intermittent every 6 hours  enoxaparin Injectable 40 milliGRAM(s) SubCutaneous every 24 hours  influenza  Vaccine (HIGH DOSE) 0.5 milliLiter(s) IntraMuscular once  metoprolol tartrate Injectable 5 milliGRAM(s) IV Push every 6 hours  pantoprazole  Injectable 40 milliGRAM(s) IV Push daily  piperacillin/tazobactam IVPB.. 3.375 Gram(s) IV Intermittent every 8 hours  sodium chloride 0.9%. 1000 milliLiter(s) (100 mL/Hr) IV Continuous <Continuous>    MEDICATIONS  (PRN):  benzocaine 20% Spray 1 Spray(s) Topical every 4 hours PRN sore throat/nausea  HYDROmorphone  Injectable 0.5 milliGRAM(s) IV Push every 6 hours PRN Severe Pain (7 - 10)  ondansetron Injectable 4 milliGRAM(s) IV Push every 6 hours PRN Nausea and/or Vomiting    Allergies    sulfa drugs (Unknown)    Intolerances      Vital Signs Last 24 Hrs  T(C): 36.9 (31 Oct 2024 05:11), Max: 36.9 (31 Oct 2024 05:11)  T(F): 98.4 (31 Oct 2024 05:11), Max: 98.4 (31 Oct 2024 05:11)  HR: 91 (31 Oct 2024 05:11) (72 - 98)  BP: 159/81 (31 Oct 2024 05:11) (109/69 - 159/81)  BP(mean): --  RR: 18 (31 Oct 2024 05:11) (11 - 18)  SpO2: 94% (31 Oct 2024 05:11) (93% - 100%)    Parameters below as of 31 Oct 2024 05:11  Patient On (Oxygen Delivery Method): room air      I&O's Summary    30 Oct 2024 07:01  -  31 Oct 2024 07:00  --------------------------------------------------------  IN: 150 mL / OUT: 1000 mL / NET: -850 mL      Weight (kg): 93.4 (10-30 @ 10:18)    TELE: SR 60-80s, PVC  PHYSICAL EXAM:  Constitutional: NAD, awake and alert  HEENT: Moist Mucous Membranes, Anicteric  Pulmonary: Non-labored, breath sounds are clear bilaterally, No wheezing, rales or rhonchi  Cardiovascular: Regular, S1 and S2, No murmurs, No rubs, gallops or clicks  Gastrointestinal:  soft, nontender, nondistended, + abd DSD, + GI ostomy,   Lymph: No peripheral edema. No lymphadenopathy.   Skin: No visible rashes or ulcers.  Psych:  Mood & affect appropriate      LABS: All Labs Reviewed:                        9.2    20.26 )-----------( 267      ( 31 Oct 2024 06:07 )             28.2                         8.6    20.62 )-----------( 296      ( 30 Oct 2024 11:30 )             26.6                         9.6    25.13 )-----------( 329      ( 30 Oct 2024 05:15 )             29.7     31 Oct 2024 06:07    135    |  104    |  26     ----------------------------<  106    4.5     |  21     |  1.50   30 Oct 2024 11:30    137    |  105    |  23     ----------------------------<  136    3.5     |  22     |  1.30   30 Oct 2024 05:15    137    |  104    |  22     ----------------------------<  152    3.6     |  25     |  1.30     Ca    7.7        31 Oct 2024 06:07  Ca    7.9        30 Oct 2024 11:30  Ca    8.5        30 Oct 2024 05:15  Phos  3.6       31 Oct 2024 06:07  Phos  2.5       30 Oct 2024 05:15  Mg     2.0       31 Oct 2024 06:07  Mg     1.8       30 Oct 2024 05:15    TPro  8.0    /  Alb  3.2    /  TBili  1.0    /  DBili  x      /  AST  21     /  ALT  25     /  AlkPhos  51     29 Oct 2024 18:20   LIVER FUNCTIONS - ( 29 Oct 2024 18:20 )  Alb: 3.2 g/dL / Pro: 8.0 g/dL / ALK PHOS: 51 U/L / ALT: 25 U/L / AST: 21 U/L / GGT: x           PT/INR - ( 30 Oct 2024 05:15 )   PT: 14.3 sec;   INR: 1.21 ratio         PTT - ( 30 Oct 2024 05:15 )  PTT:31.9 secLactate, Blood: 1.2 mmol/L (10-30-24 @ 05:15)  Lactate, Blood: 1.1 mmol/L (10-29-24 @ 18:20)    12 Lead ECG:   Ventricular Rate 106 BPM    Atrial Rate 106 BPM    P-R Interval 158 ms    QRS Duration 74 ms    Q-T Interval 342 ms    QTC Calculation(Bazett) 454 ms    P Axis 62 degrees    R Axis 7 degrees    T Axis 74 degrees    Diagnosis Line Sinus tachycardia with frequent premature ventricular complexes  Possible Left atrial enlargement  Nonspecific ST and T wave abnormality  Abnormal ECG    Confirmed by ever Shepard ([id number]) on 10/30/2024 2:26:39 PM (10-29-24 @ 19:43)

## 2024-10-31 NOTE — CARE COORDINATION ASSESSMENT. - TRANSPORTATION UTILIZED PRIOR TO ADMISSION
drives self/family/friend provides transportation Albendazole Pregnancy And Lactation Text: This medication is Pregnancy Category C and it isn't known if it is safe during pregnancy. It is also excreted in breast milk.

## 2024-10-31 NOTE — PHYSICAL THERAPY INITIAL EVALUATION ADULT - PERTINENT HX OF CURRENT PROBLEM, REHAB EVAL
79 year old female adm 10/29 with PMHx HTN, well-controlled asthma, HLD, admitted for acute sigmoid diverticulitis with abscess and perforation. 10/30- s/p ex lap, partial colectomy with end colostomy and closure of distal segment

## 2024-10-31 NOTE — PHYSICAL THERAPY INITIAL EVALUATION ADULT - ADDITIONAL COMMENTS
Pt lives alone in a house and stating she does not need to use the steps. Pt was an independent ambulator without device and independent with ADLs. + driving.

## 2024-10-31 NOTE — CASE MANAGEMENT PROGRESS NOTE - NSCMPROGRESSNOTE_GEN_ALL_CORE
Discussed pt on rounds, pt remains acute, NGT to LWS, jacome, IV Zosyn. CM will continue to collaborate with interdisciplinary team and remain available to assist.  Discussed pt on rounds, pt remains acute, NGT to STEVE, jacome, IV Zosyn, pending PT, pending wound care consult. CM will continue to collaborate with interdisciplinary team and remain available to assist.

## 2024-10-31 NOTE — PATIENT CHOICE NOTE. - NSPTCHOICENOTES_GEN_ALL_CORE
D/C resource folder provided at bedside which includes lists for both rehab facilities and home care agencies and transportation letter advising on ambulance and ambulette transportation. CM reviewed folder during assessment. Pt / daughter chose [facility] [phone]

## 2024-10-31 NOTE — CARE COORDINATION ASSESSMENT. - PRO ARRIVE FROM
CM met w pt and Elma Amaya - daughter- [phone]. Pt provided verbal consent to speak with daughter at bedside. Per pt lives alone. Pt was independent w ADL, ambulation, driving and med management prior to admission. Pt drives to appointments, has 3 stairs to enter home, 13 steps inside to bedroom and bathroom. Pt denies community services, denies DME or need for usage. CM verified: PCP: Dr. Nacho Eduardo Pharmacy: [facility]. : stated no. Pt stated her daughter will  when medically safe for discharge./home

## 2024-10-31 NOTE — PROGRESS NOTE ADULT - PROBLEM SELECTOR PLAN 1
- s/p surgery 10/30/2024. NGT in place   - NPO/IVF  - IV Abx; zosyn  - Monitor for bowel function  -Monitor counts, electrolytes

## 2024-10-31 NOTE — PROGRESS NOTE ADULT - ASSESSMENT
Case of an 80 y/o F S/p D2 of an exploratory laparotomy with Johnathan's for perforated sigmoid diverticulitis with adnexal abscess. Patient tolerated procedure well. Nauseous overnight. Refers NG tube not functioning appropriately, for which the same was flushed and placed back to suction. Denies Chest pain, fever, chills. Adequate ostomy function. Will monitor.    Plan:   NPO/IVF/NGT  I&O  IV Abx  Pain/nausea control  DVT ppx, SCDs LVX

## 2024-10-31 NOTE — PROGRESS NOTE ADULT - SUBJECTIVE AND OBJECTIVE BOX
[facility]  INFECTIOUS DISEASES   [address] [place] [address]  Tel: [phone]     Fax: [phone]  ========================================================  MD Leodan Moura Michelle, MD Shah, Kaushal, MD Sunjit, Jaspal, MD Sehrish Shahid, MD   ========================================================    [id number]  VISHAL SOTOSAPNA     Follow up: diverticulitis w/ perforation and abscess     Lying in bed, looks NAD but still has the NG tube that is bothering her. Abdominal pain has improved.   No fever.     PAST MEDICAL & SURGICAL HISTORY:  Hypertension  Hyperlipidemia  Asthma, well controlled  Prediabetes  No significant past surgical history    Social Hx: No current smoking, EtOH or drugs     FAMILY HISTORY:  No pertinent family history in first degree relatives    Allergies  sulfa drugs (Unknown)    MEDICATIONS  (STANDING):  dextrose 5% + sodium chloride 0.9%. 1000 milliLiter(s) (125 mL/Hr) IV Continuous <Continuous>  dextrose 5%. 1000 milliLiter(s) (100 mL/Hr) IV Continuous <Continuous>  dextrose 5%. 1000 milliLiter(s) (50 mL/Hr) IV Continuous <Continuous>  dextrose 50% Injectable 12.5 Gram(s) IV Push once  dextrose 50% Injectable 25 Gram(s) IV Push once  dextrose 50% Injectable 25 Gram(s) IV Push once  glucagon  Injectable 1 milliGRAM(s) IntraMuscular once  heparin   Injectable 5000 Unit(s) SubCutaneous every 8 hours  influenza  Vaccine (HIGH DOSE) 0.5 milliLiter(s) IntraMuscular once  insulin lispro (ADMELOG) corrective regimen sliding scale   SubCutaneous every 6 hours  metoprolol tartrate Injectable 5 milliGRAM(s) IV Push every 6 hours  piperacillin/tazobactam IVPB.. 3.375 Gram(s) IV Intermittent every 8 hours    MEDICATIONS  (PRN):  albuterol    90 MICROgram(s) HFA Inhaler 1 Puff(s) Inhalation every 8 hours PRN Shortness of Breath and/or Wheezing  dextrose Oral Gel 15 Gram(s) Oral once PRN Blood Glucose LESS THAN 70 milliGRAM(s)/deciliter  ondansetron Injectable 4 milliGRAM(s) IV Push every 8 hours PRN Nausea and/or Vomiting     REVIEW OF SYSTEMS:  CONSTITUTIONAL:  No Fever or chills  HEENT:  No diplopia or blurred vision.  No sore throat or runny nose.  CARDIOVASCULAR:  No chest pain   RESPIRATORY:  No cough, shortness of breath, PND or orthopnea.  GASTROINTESTINAL:  No nausea, vomiting or diarrhea. + abd pain  GENITOURINARY:  No dysuria, frequency or urgency. No Blood in urine  MUSCULOSKELETAL:  no joint aches, no muscle pain  SKIN:  No change in skin, hair or nails.    Physical Exam:  Vital Signs Last 24 Hrs  T(C): 36.5 (31 Oct 2024 11:24), Max: 36.9 (31 Oct 2024 05:11)  T(F): 97.7 (31 Oct 2024 11:24), Max: 98.4 (31 Oct 2024 05:11)  HR: 89 (31 Oct 2024 11:24) (72 - 91)  BP: 187/96 (31 Oct 2024 14:03) (109/69 - 187/96)  BP(mean): --  RR: 19 (31 Oct 2024 11:24) (11 - 19)  SpO2: 90% (31 Oct 2024 14:03) (90% - 99%)  Parameters below as of 31 Oct 2024 14:03  Patient On (Oxygen Delivery Method): room air  GEN: NAD  HEENT: normocephalic and atraumatic.   NECK: Supple.  No lymphadenopathy   LUNGS: Clear to auscultation.  HEART: Regular rate and rhythm   ABDOMEN: Soft, +Lower abd tenderness,   nondistended.  + Colostomy minimal bloody fluid   : No CVA tenderness  EXTREMITIES: + edema.  NEUROLOGIC: grossly intact.  PSYCHIATRIC: Appropriate affect .  SKIN: No rash     Labs:                        9.2    20.26 )-----------( 267      ( 31 Oct 2024 06:07 )             28.2     10-31    135  |  104  |  26[H]  ----------------------------<  106[H]  4.5   |  21[L]  |  1.50[H]    Ca    7.7[L]      31 Oct 2024 06:07  Phos  3.6     10-31  Mg     2.0     10-31    TPro  8.0  /  Alb  3.2[L]  /  TBili  1.0  /  DBili  x   /  AST  21  /  ALT  25  /  AlkPhos  51  10-29    Culture - Body Fluid with Gram Stain (collected 10-30-24 @ 11:30)  Source: Peritoneal  Gram Stain (10-30-24 @ 23:42):    polymorphonuclear leukocytes seen    No organisms seen    by cytocentrifuge    Urinalysis with Rflx Culture (collected 10-29-24 @ 19:10)    Culture - Blood (collected 10-29-24 @ 18:20)  Source: .Blood BLOOD  Preliminary Report (10-31-24 @ 01:02):    No growth at 24 hours    Culture - Blood (collected 10-29-24 @ 18:15)  Source: .Blood BLOOD  Preliminary Report (10-31-24 @ 01:02):    No growth at 24 hours    WBC Count: 20.26 K/uL (10-31-24 @ 06:07)  WBC Count: 20.62 K/uL (10-30-24 @ 11:30)  WBC Count: 25.13 K/uL (10-30-24 @ 05:15)  WBC Count: 21.12 K/uL (10-29-24 @ 18:20)    Creatinine: 1.50 mg/dL (10-31-24 @ 06:07)  Creatinine: 1.30 mg/dL (10-30-24 @ 11:30)  Creatinine: 1.30 mg/dL (10-30-24 @ 05:15)  Creatinine: 1.30 mg/dL (10-29-24 @ 18:20)    All imaging and other data have been reviewed.  < from: CT Abdomen and Pelvis w/ IV Cont (10.29.24 @ 20:21) >  IMPRESSION:  Acute sigmoid diverticulitis with associated right adnexal abscess and   perforation evidenced by small pneumoperitoneum.  After resolution of acute infection/inflammation, follow-up colonoscopy   can be considered to rule out underlying lesion.  Findings were discussed with PHOEBE MOLINA [id number] 10/29/2024   8:38 PM by Dr. Rodríguez with read back confirmation.  Indeterminant left adrenal 2.0 cm nodule. Consider adrenal CT in 12   months.    Assessment and Plan:   79 year old female with PMHx HTN, well-controlled asthma, HLD presenting to [place] ED with abdominal pain.    CT showed a perforated sigmoid colon with diverticulitis and collection. WBC 25k and Tmax 100.9.   s/p Campa procedure with colostomy on 10/30    # Perforated diverticulitis   # LAURI    - Will follow cultures   - Monitor WBC today 25-->20  - Continue zosyn 3.375gm q8 to cover abdominal henrik  - Surgery follow up     Will follow.    Tremayne Schwartz MD  Division of Infectious Diseases   Please call ID service at [phone] with any question.    50 minutes spent on total encounter assessing patient, examination, chart review, counseling and coordinating care by the attending physician/nurse/care manager.

## 2024-10-31 NOTE — PROGRESS NOTE ADULT - ASSESSMENT
79 year old female with PMHx HTN, well-controlled asthma, HLD, admitted for acute sigmoid diverticulitis with abscess and perforation, s/p Surgery/Colostomy placement 10/30/2024

## 2024-10-31 NOTE — CARE COORDINATION ASSESSMENT. - NSPASTMEDSURGHISTORY_GEN_ALL_CORE_FT
PAST MEDICAL & SURGICAL HISTORY:  Prediabetes      Asthma, well controlled      Hyperlipidemia      Hypertension      No significant past surgical history

## 2024-10-31 NOTE — CARE COORDINATION ASSESSMENT. - OTHER PERTINENT DISCHARGE PLANNING INFORMATION:
Met patient at bedside.  Explained role of CM, pt verbalized understanding. Pt was made aware a CM will remain available through hospitalization.  Contact information given in discharge/ transitions resource folder.

## 2024-10-31 NOTE — CHART NOTE - NSCHARTNOTEFT_GEN_A_CORE
Notified by nursing that ostomy was leaking , with liquid contents involving midline dressings. On evaluation, exterior dressings from midline incision were moderately soaked with seronsanguineous fluid, as well as with ostomy contents. Dressings were taken out, including Iodoform packing. Midline incision was clean, with moderate surrounding tenderness and erythema. No purulence expressed from wound. Wound was irrigated with 50cc 0.9NS, then repacked with Iodoform 0.5in-->4x4 gauze pads and secured with tape.

## 2024-10-31 NOTE — PROGRESS NOTE ADULT - SUBJECTIVE AND OBJECTIVE BOX
Patient is a 79y old  Female who presents with a chief complaint of diverticulitis w/ perforation and abscess (31 Oct 2024 08:12)       INTERVAL HPI/OVERNIGHT EVENTS: Patient seen and examined at bedside. Awake, alert. Denies abdominal pain at present, no n/v/d, denies chest pain, palpitations, sob     MEDICATIONS  (STANDING):  acetaminophen   IVPB .. 1000 milliGRAM(s) IV Intermittent every 6 hours  enoxaparin Injectable 40 milliGRAM(s) SubCutaneous every 24 hours  influenza  Vaccine (HIGH DOSE) 0.5 milliLiter(s) IntraMuscular once  metoprolol tartrate Injectable 5 milliGRAM(s) IV Push every 6 hours  pantoprazole  Injectable 40 milliGRAM(s) IV Push daily  piperacillin/tazobactam IVPB.. 3.375 Gram(s) IV Intermittent every 8 hours  sodium chloride 0.9%. 1000 milliLiter(s) (100 mL/Hr) IV Continuous <Continuous>    MEDICATIONS  (PRN):  benzocaine 20% Spray 1 Spray(s) Topical every 4 hours PRN sore throat/nausea  HYDROmorphone  Injectable 0.5 milliGRAM(s) IV Push every 6 hours PRN Severe Pain (7 - 10)  ondansetron Injectable 4 milliGRAM(s) IV Push every 6 hours PRN Nausea and/or Vomiting      Allergies    sulfa drugs (Unknown)    Intolerances        REVIEW OF SYSTEMS:  CONSTITUTIONAL: No fever or fatigue   EYES: No eye pain, visual disturbances, or discharge  ENMT:  No difficulty hearing, tinnitus, vertigo; No sinus or throat pain  NECK: No pain or stiffness  RESPIRATORY: No cough, wheezing, chills or hemoptysis; No shortness of breath  CARDIOVASCULAR: No chest pain, palpitations, dizziness, or leg swelling  GASTROINTESTINAL: No abdominal or epigastric pain. No nausea, vomiting, or hematemesis  GENITOURINARY: No dysuria, frequency, hematuria, or incontinence  NEUROLOGICAL: No headaches, memory loss, loss of strength, numbness, or tremors  SKIN: No itching, burning, rashes, or lesions     Vital Signs Last 24 Hrs  T(C): 36.9 (31 Oct 2024 05:11), Max: 36.9 (31 Oct 2024 05:11)  T(F): 98.4 (31 Oct 2024 05:11), Max: 98.4 (31 Oct 2024 05:11)  HR: 91 (31 Oct 2024 05:11) (72 - 98)  BP: 159/81 (31 Oct 2024 05:11) (109/69 - 159/81)  BP(mean): --  RR: 18 (31 Oct 2024 05:11) (11 - 18)  SpO2: 94% (31 Oct 2024 05:11) (93% - 100%)    Parameters below as of 31 Oct 2024 05:11  Patient On (Oxygen Delivery Method): room air        PHYSICAL EXAM:  GENERAL: NAD, comfortable   HEAD:  Atraumatic, Normocephalic  EYES: EOMI, PERRLA, conjunctiva and sclera clear  ENMT: No tonsillar erythema, exudates, or enlargement; Moist mucous membranes  NECK: Supple, No JVD, Normal thyroid  NERVOUS SYSTEM:  Alert & Oriented X3, Good concentration; Motor Strength 5/5 B/L upper and lower extremities  CHEST/LUNG: Clear to auscultation bilaterally; No rales, rhonchi, wheezing, or rubs  HEART: Regular rate and rhythm; No murmurs, rubs, or gallops  ABDOMEN: Soft, hypoactive BS   EXTREMITIES:  2+ Peripheral Pulses, No clubbing, cyanosis  LYMPH: No lymphadenopathy noted  SKIN: No rashes or lesions    LABS:                        9.2    20.26 )-----------( 267      ( 31 Oct 2024 06:07 )             28.2     31 Oct 2024 06:07    135    |  104    |  26     ----------------------------<  106    4.5     |  21     |  1.50     Ca    7.7        31 Oct 2024 06:07  Phos  3.6       31 Oct 2024 06:07  Mg     2.0       31 Oct 2024 06:07      PT/INR - ( 30 Oct 2024 05:15 )   PT: 14.3 sec;   INR: 1.21 ratio         PTT - ( 30 Oct 2024 05:15 )  PTT:31.9 sec  CAPILLARY BLOOD GLUCOSE      POCT Blood Glucose.: 146 mg/dL (30 Oct 2024 13:29)    BLOOD CULTURE  10-29 @ 18:20   No growth at 24 hours  --  --  10-29 @ 18:15   No growth at 24 hours  --  --    RADIOLOGY & ADDITIONAL TESTS:    Imaging Personally Reviewed:  [ ] YES     Consultant(s) Notes Reviewed:      Care Discussed with Consultants/Other Providers:

## 2024-10-31 NOTE — CARE COORDINATION ASSESSMENT. - NSCAREPROVIDERS_GEN_ALL_CORE_FT
CARE PROVIDERS:  Accepting Physician: Kirt Morales  Administration: Madi Ruiz  Administration: Derrick Brush  Admitting: Kirt Morales  Attending: Kirt Morales  Case Management: Hari Black  Consultant: Juanpablo Shepard  Consultant: Nikki Mueller  Consultant: Eulalio Newby  Consultant: Tremayne Schwartz  Consultant: Indu Guthrie  Consultant: Paul Patton  Consultant: Joana Arambula  Consultant: Milka Diaz  Consultant: Antonia Macias  Covering Team: Hadley Garza  ED ACP: La Dotson  ED Attending: Sudeep Ortega  ED Nurse: Brett Shook  Emergency Medicine: Alphonso Long  Nurse: Brisa Tinsley  Nurse: Jolanta Fields  Nurse Edu/Staff Development: Mandi Dunn  Ordered: ADM, User  Ordered: Doctor, Unknown  Outpatient Provider: Juanpablo Shepard  Override: Brenna Zabala  Override: Danyell Hdez  Override: Lizbeth Tamez  Override: Damaso Cox  Override: Christopher Raya  PCA/Nursing Assistant: Agnes Cruz  Physical Therapy: Jacy Murray  Primary Team: Tera Casper  Primary Team: Xiomara Zafar  Primary Team: Heidi Garcia  Primary Team: Kirt Morales  Primary Team: Gustavo Huff  Primary Team: Patel Gamble  Primary Team: Saroj Luis  Registered Dietitian: Sanam Hawk  Respiratory Therapy: Lidya Jameson  Team: [facility], Team

## 2024-10-31 NOTE — PROGRESS NOTE ADULT - SUBJECTIVE AND OBJECTIVE BOX
S: Patient seen and evaluated at bedside. Patient resting comfortably. States NG tube with poor output.     Vital Signs Last 24 Hrs  T(C): 36.9 (31 Oct 2024 05:11), Max: 37.3 (30 Oct 2024 08:33)  T(F): 98.4 (31 Oct 2024 05:11), Max: 99.1 (30 Oct 2024 08:33)  HR: 91 (31 Oct 2024 05:11) (72 - 111)  BP: 159/81 (31 Oct 2024 05:11) (109/69 - 159/81)  BP(mean): --  RR: 18 (31 Oct 2024 05:11) (11 - 26)  SpO2: 94% (31 Oct 2024 05:11) (93% - 100%)    PE:   General: Resting comfortably at bedside, NAD  Resp: B/l symmetric chest expansion, Normal work of breathing  CV: RRR  GI: Soft, mildly distended, nontender. NG tube in place, clotted output when suctioned/flushed. Ostomy with adequate function (Gas + Serosang fluid). Incisions c/d/i.  : Lennon in place producing clear urine.        Parameters below as of 31 Oct 2024 05:11  Patient On (Oxygen Delivery Method): room air      CBC Full  -  ( 31 Oct 2024 06:07 )  WBC Count : 20.26 K/uL  RBC Count : 3.19 M/uL  Hemoglobin : 9.2 g/dL  Hematocrit : 28.2 %  Platelet Count - Automated : 267 K/uL  Mean Cell Volume : 88.4 fl  Mean Cell Hemoglobin : 28.8 pg  Mean Cell Hemoglobin Concentration : 32.6 g/dL  Auto Neutrophil # : 16.77 K/uL  Auto Lymphocyte # : 2.34 K/uL  Auto Monocyte # : 0.99 K/uL  Auto Eosinophil # : 0.01 K/uL  Auto Basophil # : 0.02 K/uL  Auto Neutrophil % : 82.9 %  Auto Lymphocyte % : 11.5 %  Auto Monocyte % : 4.9 %  Auto Eosinophil % : 0.0 %  Auto Basophil % : 0.1 %      10-31    135  |  104  |  26[H]  ----------------------------<  106[H]  4.5   |  21[L]  |  1.50[H]    Ca    7.7[L]      31 Oct 2024 06:07  Phos  3.6     10-31  Mg     2.0     10-31    TPro  8.0  /  Alb  3.2[L]  /  TBili  1.0  /  DBili  x   /  AST  21  /  ALT  25  /  AlkPhos  51  10-29      I&O's Summary    30 Oct 2024 07:01  -  31 Oct 2024 07:00  --------------------------------------------------------  IN: 150 mL / OUT: 1000 mL / NET: -850 mL        I&O's Detail    30 Oct 2024 07:01  -  31 Oct 2024 07:00  --------------------------------------------------------  IN:    Lactated Ringers: 150 mL  Total IN: 150 mL    OUT:    Indwelling Catheter - Urethral (mL): 540 mL    Nasogastric/Oral tube (mL): 460 mL  Total OUT: 1000 mL    Total NET: -850 mL

## 2024-10-31 NOTE — PROGRESS NOTE ADULT - ASSESSMENT
79 year old female with PMHx HTN, well-controlled asthma, HLD, prediabetes presenting to [place] ED with abdominal pain, admitted for acute sigmoid diverticulitis with abscess and perforation.  Admitted for diverticulitis w/ perforation and abscess.    - Pt a/w diverticulitis w/ perforation and abscess.  - S/p Exploratory laparotomy with Johnathan procedure, drainage of abdominal abscess   - Tolerated procedure well, cardiac status optimal post operatively   - NGT in place     - EKG sinus tach with PVCs, possible L atrial enlargement, rate 106, Qtc 454  - CT Coronary 2023: Calcium score 340, no significant stenosis  - No evidence of any active ischemia   - Resume home Fenofibrate 160mg qd, Atorvastatin 10mg qhs when pt tolerates PO diet    - Previous TTE  (1/2024): LVEF 67%, LVH, mild left atrial dilation.  - No meaningful evidence of volume overload.  - Creatinine:  <--1.50,  <--1.30,  <--1.30,  <--1.30  - Agree w/ IV hydration   - Strict I/Os, daily weights.    - BP stable and controlled   - Continue IV metoprolol 5mg Q6 while NPO  - Holding home HCTZ 25mg qd, Valsartan 320mg qd, Metoprolol tartrate 100mg TID  - Tele w/ SR 60-80s, PVC, no events, can d/c telemetry    - Monitor and replete lytes, keep K>4, Mg>2.  - Will continue to follow.    Eulalio Newby, MS FNP, AGACNP  Nurse Practitioner- Cardiology   Please call on TEAMS   79 year old female with PMHx HTN, well-controlled asthma, HLD, prediabetes presenting to [place] ED with abdominal pain, admitted for acute sigmoid diverticulitis with abscess and perforation.  Admitted for diverticulitis w/ perforation and abscess.    - Pt a/w diverticulitis w/ perforation and abscess.  - S/p Exploratory laparotomy with Johnathan procedure, drainage of abdominal abscess   - Tolerated procedure well, cardiac status optimal post operatively   - NGT in place     - EKG sinus tach with PVCs, possible L atrial enlargement, rate 106, Qtc 454  - CT Coronary 2023: Calcium score 340, no significant stenosis  - No evidence of any active ischemia   - Resume home Fenofibrate 160mg qd, Atorvastatin 10mg qhs when pt tolerates PO diet    - Previous TTE  (1/2024): LVEF 67%, LVH, mild left atrial dilation.  - No meaningful evidence of volume overload.  - Creatinine:  <--1.50,  <--1.30,  <--1.30,  <--1.30  - Agree w/ IV hydration   - Strict I/Os, daily weights.    - BP stable and controlled   - Continue IV metoprolol 5mg Q6 while NPO  - Holding home HCTZ 25mg qd, Valsartan 320mg qd, Metoprolol tartrate 100mg TID as she is NPO  - Tele w/ SR 60-80s, PVC, no events, can d/c telemetry    - Monitor and replete lytes, keep K>4, Mg>2.  - Will continue to follow.    Eulalio Newby, MS FNP, AGACNP  Nurse Practitioner- Cardiology   Please call on TEAMS

## 2024-11-01 LAB
ALBUMIN SERPL ELPH-MCNC: 2.3 G/DL — LOW (ref 3.3–5)
ALP SERPL-CCNC: 61 U/L — SIGNIFICANT CHANGE UP (ref 40–120)
ALT FLD-CCNC: 20 U/L — SIGNIFICANT CHANGE UP (ref 12–78)
ANION GAP SERPL CALC-SCNC: 10 MMOL/L — SIGNIFICANT CHANGE UP (ref 5–17)
ANION GAP SERPL CALC-SCNC: 9 MMOL/L — SIGNIFICANT CHANGE UP (ref 5–17)
AST SERPL-CCNC: 21 U/L — SIGNIFICANT CHANGE UP (ref 15–37)
BASOPHILS # BLD AUTO: 0.04 K/UL — SIGNIFICANT CHANGE UP (ref 0–0.2)
BASOPHILS NFR BLD AUTO: 0.2 % — SIGNIFICANT CHANGE UP (ref 0–2)
BILIRUB SERPL-MCNC: 0.6 MG/DL — SIGNIFICANT CHANGE UP (ref 0.2–1.2)
BUN SERPL-MCNC: 29 MG/DL — HIGH (ref 7–23)
BUN SERPL-MCNC: 32 MG/DL — HIGH (ref 7–23)
CALCIUM SERPL-MCNC: 8 MG/DL — LOW (ref 8.5–10.1)
CALCIUM SERPL-MCNC: 8.3 MG/DL — LOW (ref 8.5–10.1)
CHLORIDE SERPL-SCNC: 105 MMOL/L — SIGNIFICANT CHANGE UP (ref 96–108)
CHLORIDE SERPL-SCNC: 106 MMOL/L — SIGNIFICANT CHANGE UP (ref 96–108)
CO2 SERPL-SCNC: 23 MMOL/L — SIGNIFICANT CHANGE UP (ref 22–31)
CO2 SERPL-SCNC: 24 MMOL/L — SIGNIFICANT CHANGE UP (ref 22–31)
CREAT SERPL-MCNC: 1.5 MG/DL — HIGH (ref 0.5–1.3)
CREAT SERPL-MCNC: 1.5 MG/DL — HIGH (ref 0.5–1.3)
EGFR: 35 ML/MIN/1.73M2 — LOW
EGFR: 35 ML/MIN/1.73M2 — LOW
EOSINOPHIL # BLD AUTO: 0.12 K/UL — SIGNIFICANT CHANGE UP (ref 0–0.5)
EOSINOPHIL NFR BLD AUTO: 0.7 % — SIGNIFICANT CHANGE UP (ref 0–6)
GLUCOSE SERPL-MCNC: 116 MG/DL — HIGH (ref 70–99)
GLUCOSE SERPL-MCNC: 125 MG/DL — HIGH (ref 70–99)
HCT VFR BLD CALC: 30 % — LOW (ref 34.5–45)
HGB BLD-MCNC: 9.9 G/DL — LOW (ref 11.5–15.5)
IMM GRANULOCYTES NFR BLD AUTO: 0.9 % — SIGNIFICANT CHANGE UP (ref 0–0.9)
LYMPHOCYTES # BLD AUTO: 12.4 % — LOW (ref 13–44)
LYMPHOCYTES # BLD AUTO: 2.15 K/UL — SIGNIFICANT CHANGE UP (ref 1–3.3)
MAGNESIUM SERPL-MCNC: 2.1 MG/DL — SIGNIFICANT CHANGE UP (ref 1.6–2.6)
MCHC RBC-ENTMCNC: 28.7 PG — SIGNIFICANT CHANGE UP (ref 27–34)
MCHC RBC-ENTMCNC: 33 G/DL — SIGNIFICANT CHANGE UP (ref 32–36)
MCV RBC AUTO: 87 FL — SIGNIFICANT CHANGE UP (ref 80–100)
MONOCYTES # BLD AUTO: 0.91 K/UL — HIGH (ref 0–0.9)
MONOCYTES NFR BLD AUTO: 5.3 % — SIGNIFICANT CHANGE UP (ref 2–14)
NEUTROPHILS # BLD AUTO: 13.91 K/UL — HIGH (ref 1.8–7.4)
NEUTROPHILS NFR BLD AUTO: 80.5 % — HIGH (ref 43–77)
NRBC # BLD: 0 /100 WBCS — SIGNIFICANT CHANGE UP (ref 0–0)
PHOSPHATE SERPL-MCNC: 2.9 MG/DL — SIGNIFICANT CHANGE UP (ref 2.5–4.5)
PLATELET # BLD AUTO: 352 K/UL — SIGNIFICANT CHANGE UP (ref 150–400)
POTASSIUM SERPL-MCNC: 3.7 MMOL/L — SIGNIFICANT CHANGE UP (ref 3.5–5.3)
POTASSIUM SERPL-MCNC: 3.8 MMOL/L — SIGNIFICANT CHANGE UP (ref 3.5–5.3)
POTASSIUM SERPL-SCNC: 3.7 MMOL/L — SIGNIFICANT CHANGE UP (ref 3.5–5.3)
POTASSIUM SERPL-SCNC: 3.8 MMOL/L — SIGNIFICANT CHANGE UP (ref 3.5–5.3)
PROT SERPL-MCNC: 6.7 G/DL — SIGNIFICANT CHANGE UP (ref 6–8.3)
RBC # BLD: 3.45 M/UL — LOW (ref 3.8–5.2)
RBC # FLD: 14.4 % — SIGNIFICANT CHANGE UP (ref 10.3–14.5)
SODIUM SERPL-SCNC: 138 MMOL/L — SIGNIFICANT CHANGE UP (ref 135–145)
SODIUM SERPL-SCNC: 139 MMOL/L — SIGNIFICANT CHANGE UP (ref 135–145)
SURGICAL PATHOLOGY STUDY: SIGNIFICANT CHANGE UP
WBC # BLD: 17.28 K/UL — HIGH (ref 3.8–10.5)
WBC # FLD AUTO: 17.28 K/UL — HIGH (ref 3.8–10.5)

## 2024-11-01 PROCEDURE — 99232 SBSQ HOSP IP/OBS MODERATE 35: CPT

## 2024-11-01 PROCEDURE — 99233 SBSQ HOSP IP/OBS HIGH 50: CPT

## 2024-11-01 RX ORDER — OXYCODONE HYDROCHLORIDE 30 MG/1
5 TABLET ORAL EVERY 4 HOURS
Refills: 0 | Status: DISCONTINUED | OUTPATIENT
Start: 2024-11-01 | End: 2024-11-05

## 2024-11-01 RX ORDER — SODIUM CHLORIDE 9 MG/ML
1000 INJECTION, SOLUTION INTRAMUSCULAR; INTRAVENOUS; SUBCUTANEOUS ONCE
Refills: 0 | Status: COMPLETED | OUTPATIENT
Start: 2024-11-01 | End: 2024-11-01

## 2024-11-01 RX ORDER — METOPROLOL TARTRATE 50 MG
100 TABLET ORAL THREE TIMES A DAY
Refills: 0 | Status: DISCONTINUED | OUTPATIENT
Start: 2024-11-01 | End: 2024-11-02

## 2024-11-01 RX ORDER — VALSARTAN 160 MG/1
320 TABLET ORAL DAILY
Refills: 0 | Status: DISCONTINUED | OUTPATIENT
Start: 2024-11-01 | End: 2024-11-05

## 2024-11-01 RX ORDER — SIMETHICONE 80 MG/1
80 TABLET, CHEWABLE ORAL ONCE
Refills: 0 | Status: COMPLETED | OUTPATIENT
Start: 2024-11-01 | End: 2024-11-01

## 2024-11-01 RX ORDER — ACETAMINOPHEN 500 MG
1000 TABLET ORAL EVERY 6 HOURS
Refills: 0 | Status: DISCONTINUED | OUTPATIENT
Start: 2024-11-01 | End: 2024-11-05

## 2024-11-01 RX ORDER — PANTOPRAZOLE SODIUM 40 MG/1
40 TABLET, DELAYED RELEASE ORAL
Refills: 0 | Status: DISCONTINUED | OUTPATIENT
Start: 2024-11-01 | End: 2024-11-05

## 2024-11-01 RX ADMIN — PIPERACILLIN AND TAZOBACTAM 25 GRAM(S): .5; 4 INJECTION, POWDER, LYOPHILIZED, FOR SOLUTION INTRAVENOUS at 02:04

## 2024-11-01 RX ADMIN — Medication 400 MILLIGRAM(S): at 06:22

## 2024-11-01 RX ADMIN — SIMETHICONE 80 MILLIGRAM(S): 80 TABLET, CHEWABLE ORAL at 22:53

## 2024-11-01 RX ADMIN — Medication 400 MILLIGRAM(S): at 11:28

## 2024-11-01 RX ADMIN — KETOROLAC TROMETHAMINE 15 MILLIGRAM(S): 30 INJECTION INTRAMUSCULAR; INTRAVENOUS at 06:19

## 2024-11-01 RX ADMIN — Medication 2 PUFF(S): at 21:36

## 2024-11-01 RX ADMIN — Medication 1000 MILLIGRAM(S): at 17:04

## 2024-11-01 RX ADMIN — Medication 5 MILLIGRAM(S): at 00:41

## 2024-11-01 RX ADMIN — PIPERACILLIN AND TAZOBACTAM 25 GRAM(S): .5; 4 INJECTION, POWDER, LYOPHILIZED, FOR SOLUTION INTRAVENOUS at 06:18

## 2024-11-01 RX ADMIN — Medication 40 MILLIGRAM(S): at 11:28

## 2024-11-01 RX ADMIN — Medication 5 MILLIGRAM(S): at 06:20

## 2024-11-01 RX ADMIN — Medication 5 MILLIGRAM(S): at 11:27

## 2024-11-01 RX ADMIN — SODIUM CHLORIDE 100 MILLILITER(S): 9 INJECTION, SOLUTION INTRAMUSCULAR; INTRAVENOUS; SUBCUTANEOUS at 11:28

## 2024-11-01 RX ADMIN — PIPERACILLIN AND TAZOBACTAM 25 GRAM(S): .5; 4 INJECTION, POWDER, LYOPHILIZED, FOR SOLUTION INTRAVENOUS at 21:23

## 2024-11-01 RX ADMIN — Medication 100 MILLIGRAM(S): at 21:23

## 2024-11-01 RX ADMIN — SODIUM CHLORIDE 250 MILLILITER(S): 9 INJECTION, SOLUTION INTRAMUSCULAR; INTRAVENOUS; SUBCUTANEOUS at 10:26

## 2024-11-01 RX ADMIN — PANTOPRAZOLE SODIUM 40 MILLIGRAM(S): 40 TABLET, DELAYED RELEASE ORAL at 11:28

## 2024-11-01 RX ADMIN — KETOROLAC TROMETHAMINE 15 MILLIGRAM(S): 30 INJECTION INTRAMUSCULAR; INTRAVENOUS at 00:33

## 2024-11-01 NOTE — DIETITIAN INITIAL EVALUATION ADULT - OTHER INFO
Reason for Admission: diverticulitis w/ perforation and abscess  History of Present Illness:   79 year old female with PMHx HTN, well-controlled asthma, HLD presenting to [place] ED with abdominal pain.  Patient states abdominal pain started about 1 week ago.  She locates pain to RLQ, states is sharp in nature and at time of onset was 7/10 in intensity.  Over the past week, patient states pain has increased to now 10/10.  Patient saw PCP earlier today who advised come to ED for further evaluation.  Patient also notes fever 102 taken at home prior to arrival.  Patient states pain has radiated now into LLQ.  Patient denies change in bowel habits, chills, n/v, PO intolerance, chest pain, SOB, calf pain, palpitations.   weight 205#   reports with change in diet some recent weight loss  IV NS 100ml hr  NGT removed  patient status post exp LAP Johnathan's

## 2024-11-01 NOTE — PROGRESS NOTE ADULT - SUBJECTIVE AND OBJECTIVE BOX
[facility] -- Gerry Wiseman, Juliet, Devyn Doherty, , Emmanuel Diaz  Office # [phone]    Follow Up:  Edema, HTN    Subjective/Observations: Patient seen and examined. Sitting upright in chair. Reports feeling well, happy NGT is out. Denies chest pain, palpitations, SOB/SILVERMAN, orthopnea or any other complaints. Tolerating 2.5L nasal cannula well.      REVIEW OF SYSTEMS: All other review of systems is negative unless indicated above  PAST MEDICAL & SURGICAL HISTORY:  Hypertension      Hyperlipidemia      Asthma, well controlled      Prediabetes      No significant past surgical history        MEDICATIONS  (STANDING):  enoxaparin Injectable 40 milliGRAM(s) SubCutaneous every 24 hours  influenza  Vaccine (HIGH DOSE) 0.5 milliLiter(s) IntraMuscular once  metoprolol tartrate Injectable 5 milliGRAM(s) IV Push every 6 hours  pantoprazole  Injectable 40 milliGRAM(s) IV Push daily  piperacillin/tazobactam IVPB.. 3.375 Gram(s) IV Intermittent every 8 hours  sodium chloride 0.9%. 1000 milliLiter(s) (100 mL/Hr) IV Continuous <Continuous>    MEDICATIONS  (PRN):  albuterol    90 MICROgram(s) HFA Inhaler 2 Puff(s) Inhalation every 6 hours PRN for bronchospasm  benzocaine 20% Spray 1 Spray(s) Topical every 4 hours PRN sore throat/nausea  hydrALAZINE Injectable 10 milliGRAM(s) IV Push every 6 hours PRN SBP> 155  ondansetron Injectable 4 milliGRAM(s) IV Push every 6 hours PRN Nausea and/or Vomiting    Allergies    sulfa drugs (Unknown)    Intolerances      Vital Signs Last 24 Hrs  T(C): 36.9 (01 Nov 2024 11:22), Max: 36.9 (01 Nov 2024 05:02)  T(F): 98.4 (01 Nov 2024 11:22), Max: 98.4 (01 Nov 2024 05:02)  HR: 94 (01 Nov 2024 11:22) (94 - 108)  BP: 161/82 (01 Nov 2024 11:22) (152/82 - 187/96)  BP(mean): --  RR: 18 (01 Nov 2024 11:22) (18 - 19)  SpO2: 94% (01 Nov 2024 11:22) (90% - 96%)    Parameters below as of 01 Nov 2024 11:22  Patient On (Oxygen Delivery Method): room air      I&O's Summary    31 Oct 2024 07:01  -  01 Nov 2024 07:00  --------------------------------------------------------  IN: 1300 mL / OUT: 1130 mL / NET: 170 mL        PHYSICAL EXAM:  TELE:   Constitutional: NAD, awake and alert, well-developed  HEENT: Moist Mucous Membranes, Anicteric  Pulmonary: Non-labored, breath sounds are clear bilaterally, No wheezing, rales or rhonchi  Cardiovascular: Regular, S1 and S2, No murmurs, rubs, gallops or clicks  Gastrointestinal: Bowel Sounds present, soft, nontender.   Lymph: No peripheral edema. No lymphadenopathy.  Skin: No visible rashes or ulcers.  Psych:  Mood & affect appropriate  LABS: All Labs Reviewed:                        9.9    17.28 )-----------( 352      ( 01 Nov 2024 07:30 )             30.0                         9.2    20.26 )-----------( 267      ( 31 Oct 2024 06:07 )             28.2                         8.6    20.62 )-----------( 296      ( 30 Oct 2024 11:30 )             26.6     01 Nov 2024 07:30    139    |  105    |  32     ----------------------------<  125    3.7     |  24     |  1.50   31 Oct 2024 06:07    135    |  104    |  26     ----------------------------<  106    4.5     |  21     |  1.50   30 Oct 2024 11:30    137    |  105    |  23     ----------------------------<  136    3.5     |  22     |  1.30     Ca    8.0        01 Nov 2024 07:30  Ca    7.7        31 Oct 2024 06:07  Ca    7.9        30 Oct 2024 11:30  Phos  2.9       01 Nov 2024 07:30  Phos  3.6       31 Oct 2024 06:07  Phos  2.5       30 Oct 2024 05:15  Mg     2.1       01 Nov 2024 07:30  Mg     2.0       31 Oct 2024 06:07  Mg     1.8       30 Oct 2024 05:15    TPro  6.7    /  Alb  2.3    /  TBili  0.6    /  DBili  x      /  AST  21     /  ALT  20     /  AlkPhos  61     01 Nov 2024 07:30  TPro  8.0    /  Alb  3.2    /  TBili  1.0    /  DBili  x      /  AST  21     /  ALT  25     /  AlkPhos  51     29 Oct 2024 18:20          12 Lead ECG:   Ventricular Rate 85 BPM    Atrial Rate 85 BPM    P-R Interval 148 ms    QRS Duration 84 ms    Q-T Interval 376 ms    QTC Calculation(Bazett) 447 ms    P Axis 73 degrees    R Axis 9 degrees    T Axis 44 degrees    Diagnosis Line Normal sinus rhythm  T wave abnormality, consider lateral ischemia  Abnormal ECG    Confirmed by Milka Diaz ([id number]) on 10/31/2024 1:50:09 PM (10-31-24 @ 08:23)      TRANSTHORACIC ECHOCARDIOGRAM REPORT  ________________________________________________________________________________                                      _______       Pt. Name:       VISHAL FULTON Study Date:    10/30/2024  MRN:            [id number]      YOB: 1945  Accession #:    [id number]     Age:           79 years  Account#:       [id number]    Gender:        F  Heart Rate:                   Height:        64.17 in (163.00 cm)  Rhythm:                       Weight:        207.23 lb (94.00 kg)  Blood Pressure: 127/77 mmHg   BSA/BMI:       1.99 m² / 35.38 kg/m²  ________________________________________________________________________________________  Referring Physician:    [id number] Kirt Morales  Interpreting Physician: Eliezer Doherty  Primary Sonographer:    Isidro Duque    CPT:               ECHO TTE WO CON COMP W DOPP - 57671.m  Indication(s):     Heart failure, unspecified - I50.9  Procedure:         Transthoracic echocardiogram with 2-D, M-mode and complete                     spectral and color flow Doppler.  Ordering Location: [facility]  Admission Status:  Inpatient  Study Information: Image quality for this study is technically difficult.    _______________________________________________________________________________________     CONCLUSIONS:      1. Technically difficult image quality.   2. Left ventricular endocardium is not well visualized; however, the left ventricular systolic function appears grossly normal.   3. Left ventricular systolic function is normal with an ejection fraction of 59 % by Soto's method of disks.   4. There is normal LV mass and concentric remodeling.   5. Normal right ventricular cavity size and probably normal right ventricular systolic function.   6. The right atrium is normal in size.   7. Left atrium is normal in size.   8. Mitral valve leaflets are diffusely calcified.   9. Mild mitral regurgitation.  10. Trileaflet aortic valve with normal systolic excursion. There is focal calcification of the aortic valve leaflets.  11. Estimated pulmonary artery systolic pressure is 33 mmHg, consistent with normal pulmonary artery pressure.    ________________________________________________________________________________________  FINDINGS:     Left Ventricle:  Left ventricular systolic function is normal with a calculated ejection fraction of 59 % by the Soto's biplane method of disks. There is normal LV mass and concentric remodeling. Left ventricular endocardium is not well visualized; however, the left ventricular systolic function appears grossly normal. The left ventricular diastolic function is indeterminate.     Right Ventricle:  The right ventricular cavity is normal in size and right ventricular systolic function is probably normal. Tricuspid annular plane systolic excursion (TAPSE) is 2.3 cm (normal >=1.7 cm).     Left Atrium:  The left atrium is normal in size with an indexed volume of 22.72 ml/m².     Right Atrium:  The right atrium is normal in size.     Interatrial Septum:  The interatrial septum appears intact.     Aortic Valve:  The aortic valve appears trileaflet with normal systolic excursion. There is focal calcification of the aortic valve leaflets.     Mitral Valve:  There is calcification of the mitral valve annulus. Mitral valve leaflets are diffusely calcified. There is mild mitral regurgitation.     Tricuspid Valve:  There is mild tricuspid regurgitation. Estimated pulmonary artery systolic pressure is 33 mmHg, consistent with normal pulmonary artery pressure.     Pulmonic Valve:  The pulmonic valve was not well visualized.     Aorta:  The aortic root at the sinuses of Valsalva is normal in size, measuring 2.60 cm (indexed 1.31 cm/m²). The ascending aorta is normal in size, measuring 2.30 cm (indexed 1.16 cm/m²).     Systemic Veins:  The inferior vena cava is dilated measuring 2.49 cm in diameter, (dilated >2.1cm) with normal inspiratory collapse (normal >50%) consistent with mildly elevated right atrial pressure (~8, range 5-10mmHg).  ____________________________________________________________________  QUANTITATIVE DATA:  Left Ventricle Measurements: (Indexed to BSA)     IVSd (2D):   1.0 cm  LVPWd (2D):  1.0 cm  LVIDd (2D):  4.1 cm  LVIDs (2D):  2.4 cm  LV Mass:     137 g  69.0 g/m²  LV Vol d, MOD A2C:41.9 ml 21.06 ml/m²  LV Vol d, MOD A4C: 51.0 ml 25.63 ml/m²  LV Vol d, MOD BP:  46.0 ml 23.12 ml/m²  LV Vol s, MOD A2C: 16.5 ml 8.29 ml/m²  LV Vol s, MOD A4C: 21.0 ml 10.55 ml/m²  LV Vol s, MOD BP:  18.8 ml 9.44 ml/m²  LVOT SV MOD BP:    27.2 ml  LV EF% MOD BP:     59 %     MV E Vmax:    1.13 m/s  MV A Vmax:    1.06 m/s  MV E/A:       1.07  e' lateral:   10.40 cm/s  e' medial:    8.81 cm/s  E/e' lateral: 10.87  E/e' medial:  12.83  E/e' Average: 11.76  MV DT:        201 msec    Aorta Measurements: (Normal range) (Indexed to BSA)     Ao Root d     2.60 cm (2.7 - 3.3 cm) 1.31 cm/m²  Ao Asc d, 2D: 2.30  Ao Asc prox:  2.30 cm                1.16 cm/m²            Left Atrium Measurements: (Indexed to BSA)  LA Diam 2D: 3.90 cm         Right Ventricle Measurements:     TAPSE:            2.3 cm  RV Base (RVID1):  3.2 cm  RV Mid (RVID2):   2.2 cm  RV Major (RVID3): 5.8 cm       LVOT / RVOT/ Qp/Qs Data: (Indexed to BSA)  LVOT Diameter: 2.00 cm  LVOT Area:     3.14 cm²    Mitral Valve Measurements:     MV E Vmax: 1.1 m/s         MR Vmax:          4.81 m/s  MV A Vmax: 1.1 m/s         MR Peak Gradient: 92.5 mmHg  MV E/A:    1.1       Tricuspid Valve Measurements:     TR Vmax:          2.5 m/s  TR Peak Gradient: 24.6 mmHg  RA Pressure:      8 mmHg  PASP:             33 mmHg    ________________________________________________________________________________________  Electronically signed on 10/31/2024 at 1:33:33 PM by Eliezer Doherty         *** Final ***

## 2024-11-01 NOTE — CASE MANAGEMENT PROGRESS NOTE - NSCMPROGRESSNOTE_GEN_ALL_CORE
CM met w/ pt & daughter at bedside and discussed transition planning. Floor staff to start ostomy teaching over weekend. [facility] referral has been started and [facility] initiated by previous CM. Patient requesting a RW, referral initiated and Rx in chart. Pt's daughters will be alternating staying with pt at home on DC. CM will continue to follow.

## 2024-11-01 NOTE — PROGRESS NOTE ADULT - ASSESSMENT
79 year old female with PMHx HTN, well-controlled asthma, HLD, prediabetes presenting to [place] ED with abdominal pain, admitted for acute sigmoid diverticulitis with abscess and perforation.  Admitted for diverticulitis w/ perforation and abscess.    - Pt a/w diverticulitis w/ perforation and abscess.  - S/p Exploratory laparotomy with Johnathan procedure, drainage of abdominal abscess   - Tolerated procedure well, cardiac status optimal post operatively   - s/p NGT, diet advanced per surgery    - EKG sinus tach with PVCs, possible L atrial enlargement, rate 106, Qtc 454  - CT Coronary 2023: Calcium score 340, no significant stenosis  - No evidence of any active ischemia   - Resume home Fenofibrate 160mg qd, Atorvastatin 10mg qhs when pt tolerates PO diet    - Previous TTE  (1/2024): LVEF 67%, LVH, mild left atrial dilation.  - No meaningful evidence of volume overload.  - Creatinine:  1.50 <--1.50,  <--1.30,  <--1.30,  <--1.30  - Agree w/ IV hydration   - Strict I/Os, daily weights.    - BP stable and controlled   - Continue IV metoprolol 5mg Q6 while NPO  - Transition to home HCTZ 25mg qd, Valsartan 320mg qd, Metoprolol tartrate 100mg TID once able to tolerate diet  - Tele w/ SR 60-80s, PVC, no events, can d/c telemetry    - Monitor and replete lytes, keep K>4, Mg>2.  - Will continue to follow.    Sona Gilbert PA-C  Cardiology  Call TEAMS

## 2024-11-01 NOTE — DIETITIAN INITIAL EVALUATION ADULT - NSFNSGIIOFT_GEN_A_CORE
10-31-24 @ 07:01  -  11-01-24 @ 07:00  --------------------------------------------------------  OUT:    Colostomy (mL): 80 mL    Nasogastric/Oral tube (mL): 250 mL  Total OUT: 330 mL    Total NET: -330 mL

## 2024-11-01 NOTE — DIETITIAN INITIAL EVALUATION ADULT - ORAL INTAKE PTA/DIET HISTORY
patient seen with family in room. patient sitting in chair. clear liquids taken this AM with No N/V reported.   no food allergies  no difficulties chewing swallowing noted PTA  patient reports to follow [facility] [place] diet. A1c 6.5% hx Pre DM uses stevia

## 2024-11-01 NOTE — PROGRESS NOTE ADULT - PROBLEM SELECTOR PLAN 1
- s/p surgery 10/30/2024. Post Ope Ileus. NGT in place  -Darío   - NPO/IVF  - IV Abx; zosyn  - Monitor for bowel function  -Monitor counts, electrolytes

## 2024-11-01 NOTE — PROGRESS NOTE ADULT - SUBJECTIVE AND OBJECTIVE BOX
[facility]  INFECTIOUS DISEASES   [address] [place] [address]  Tel: [phone]     Fax: [phone]  ========================================================  MD Leodan Moura Michelle, MD Shah, Kaushal, MD Sunjit, Jaspal, MD Sehrish Shahid, MD   ========================================================    [id number]  VISHAL KIRSTIN     Follow up: diverticulitis w/ perforation and abscess     Sitting on a chair, looks NAD, NG tube has been removed. Had PT yesterday.   Abdominal pain has improved.   No fever.     PAST MEDICAL & SURGICAL HISTORY:  Hypertension  Hyperlipidemia  Asthma, well controlled  Prediabetes  No significant past surgical history    Social Hx: No current smoking, EtOH or drugs     FAMILY HISTORY:  No pertinent family history in first degree relatives    Allergies  sulfa drugs (Unknown)    MEDICATIONS  (STANDING):  dextrose 5% + sodium chloride 0.9%. 1000 milliLiter(s) (125 mL/Hr) IV Continuous <Continuous>  dextrose 5%. 1000 milliLiter(s) (100 mL/Hr) IV Continuous <Continuous>  dextrose 5%. 1000 milliLiter(s) (50 mL/Hr) IV Continuous <Continuous>  dextrose 50% Injectable 12.5 Gram(s) IV Push once  dextrose 50% Injectable 25 Gram(s) IV Push once  dextrose 50% Injectable 25 Gram(s) IV Push once  glucagon  Injectable 1 milliGRAM(s) IntraMuscular once  heparin   Injectable 5000 Unit(s) SubCutaneous every 8 hours  influenza  Vaccine (HIGH DOSE) 0.5 milliLiter(s) IntraMuscular once  insulin lispro (ADMELOG) corrective regimen sliding scale   SubCutaneous every 6 hours  metoprolol tartrate Injectable 5 milliGRAM(s) IV Push every 6 hours  piperacillin/tazobactam IVPB.. 3.375 Gram(s) IV Intermittent every 8 hours    MEDICATIONS  (PRN):  albuterol    90 MICROgram(s) HFA Inhaler 1 Puff(s) Inhalation every 8 hours PRN Shortness of Breath and/or Wheezing  dextrose Oral Gel 15 Gram(s) Oral once PRN Blood Glucose LESS THAN 70 milliGRAM(s)/deciliter  ondansetron Injectable 4 milliGRAM(s) IV Push every 8 hours PRN Nausea and/or Vomiting     REVIEW OF SYSTEMS:  CONSTITUTIONAL:  No Fever or chills  HEENT:  No diplopia or blurred vision.  No sore throat or runny nose.  CARDIOVASCULAR:  No chest pain   RESPIRATORY:  No cough, shortness of breath, PND or orthopnea.  GASTROINTESTINAL:  No nausea, vomiting or diarrhea. + abd pain  GENITOURINARY:  No dysuria, frequency or urgency. No Blood in urine  MUSCULOSKELETAL:  no joint aches, no muscle pain  SKIN:  No change in skin, hair or nails.    Physical Exam:  Vital Signs Last 24 Hrs  T(C): 36.9 (01 Nov 2024 05:02), Max: 36.9 (01 Nov 2024 05:02)  T(F): 98.4 (01 Nov 2024 05:02), Max: 98.4 (01 Nov 2024 05:02)  HR: 108 (01 Nov 2024 05:02) (89 - 108)  BP: 165/81 (01 Nov 2024 05:02) (152/82 - 187/96)  BP(mean): --  RR: 18 (01 Nov 2024 05:02) (18 - 19)  SpO2: 94% (01 Nov 2024 05:02) (90% - 96%)  Parameters below as of 01 Nov 2024 05:02  Patient On (Oxygen Delivery Method): nasal cannula  GEN: NAD  HEENT: normocephalic and atraumatic.   NECK: Supple.  No lymphadenopathy   LUNGS: Clear to auscultation.  HEART: Regular rate and rhythm   ABDOMEN: Soft, +Lower abd tenderness, Wound clean  nondistended.  + Colostomy minimal bloody fluid   : No CVA tenderness  EXTREMITIES: + edema.  NEUROLOGIC: grossly intact.  PSYCHIATRIC: Appropriate affect .  SKIN: No rash     Labs:                        9.9    17.28 )-----------( 352      ( 01 Nov 2024 07:30 )             30.0     11-01    139  |  105  |  32[H]  ----------------------------<  125[H]  3.7   |  24  |  1.50[H]    Ca    8.0[L]      01 Nov 2024 07:30  Phos  2.9     11-01  Mg     2.1     11-01    TPro  6.7  /  Alb  2.3[L]  /  TBili  0.6  /  DBili  x   /  AST  21  /  ALT  20  /  AlkPhos  61  11-01    Culture - Body Fluid with Gram Stain (collected 10-30-24 @ 11:30)  Source: Peritoneal  Gram Stain (10-30-24 @ 23:42):    polymorphonuclear leukocytes seen    No organisms seen    by cytocentrifuge  Preliminary Report (10-31-24 @ 14:44):    No growth    Urinalysis with Rflx Culture (collected 10-29-24 @ 19:10)    Culture - Blood (collected 10-29-24 @ 18:20)  Source: .Blood BLOOD  Preliminary Report (11-01-24 @ 01:02):    No growth at 48 Hours    Culture - Blood (collected 10-29-24 @ 18:15)  Source: .Blood BLOOD  Preliminary Report (11-01-24 @ 01:02):    No growth at 48 Hours    WBC Count: 17.28 K/uL (11-01-24 @ 07:30)  WBC Count: 20.26 K/uL (10-31-24 @ 06:07)  WBC Count: 20.62 K/uL (10-30-24 @ 11:30)  WBC Count: 25.13 K/uL (10-30-24 @ 05:15)  WBC Count: 21.12 K/uL (10-29-24 @ 18:20)    Creatinine: 1.50 mg/dL (11-01-24 @ 07:30)  Creatinine: 1.50 mg/dL (10-31-24 @ 06:07)  Creatinine: 1.30 mg/dL (10-30-24 @ 11:30)  Creatinine: 1.30 mg/dL (10-30-24 @ 05:15)  Creatinine: 1.30 mg/dL (10-29-24 @ 18:20)    All imaging and other data have been reviewed.  < from: CT Abdomen and Pelvis w/ IV Cont (10.29.24 @ 20:21) >  IMPRESSION:  Acute sigmoid diverticulitis with associated right adnexal abscess and   perforation evidenced by small pneumoperitoneum.  After resolution of acute infection/inflammation, follow-up colonoscopy   can be considered to rule out underlying lesion.  Findings were discussed with PHOEBE MOLINA [id number] 10/29/2024   8:38 PM by Dr. Rodríguez with read back confirmation.  Indeterminant left adrenal 2.0 cm nodule. Consider adrenal CT in 12   months.    Assessment and Plan:   79 year old female with PMHx HTN, well-controlled asthma, HLD presenting to [place] ED with abdominal pain.    CT showed a perforated sigmoid colon with diverticulitis and collection. WBC 25k and Tmax 100.9.   s/p Campa procedure with colostomy on 10/30    # Perforated diverticulitis   # LAURI    - Blood cultures NGTD  - Peritoneal culture so far negative   - Monitor WBC today 25-->17  - Continue zosyn 3.375gm q8 to cover abdominal henrik  - Surgery follow up   - When ready for discharge and on regular diet will switch to oral Abx    Will follow PRN.  Dr. Flanagan is covering me this weekend.     Tremayne Schwartz MD  Division of Infectious Diseases   Please call ID service at [phone] with any question.    50 minutes spent on total encounter assessing patient, examination, chart review, counseling and coordinating care by the attending physician/nurse/care manager.

## 2024-11-01 NOTE — PROGRESS NOTE ADULT - SUBJECTIVE AND OBJECTIVE BOX
Patient is a 79y old  Female who presents with a chief complaint of diverticulitis w/ perforation and abscess (31 Oct 2024 14:34)       INTERVAL HPI/OVERNIGHT EVENTS: Patient seen and examined at bedside. NGT in place. Denies abdominal pain, n/v/ chest pain, palpitations, sob.     MEDICATIONS  (STANDING):  acetaminophen   IVPB .. 1000 milliGRAM(s) IV Intermittent once  enoxaparin Injectable 40 milliGRAM(s) SubCutaneous every 24 hours  influenza  Vaccine (HIGH DOSE) 0.5 milliLiter(s) IntraMuscular once  metoprolol tartrate Injectable 5 milliGRAM(s) IV Push every 6 hours  pantoprazole  Injectable 40 milliGRAM(s) IV Push daily  piperacillin/tazobactam IVPB.. 3.375 Gram(s) IV Intermittent every 8 hours  sodium chloride 0.9%. 1000 milliLiter(s) (100 mL/Hr) IV Continuous <Continuous>    MEDICATIONS  (PRN):  albuterol    90 MICROgram(s) HFA Inhaler 2 Puff(s) Inhalation every 6 hours PRN for bronchospasm  benzocaine 20% Spray 1 Spray(s) Topical every 4 hours PRN sore throat/nausea  hydrALAZINE Injectable 10 milliGRAM(s) IV Push every 6 hours PRN SBP> 155  ondansetron Injectable 4 milliGRAM(s) IV Push every 6 hours PRN Nausea and/or Vomiting      Allergies    sulfa drugs (Unknown)    Intolerances        REVIEW OF SYSTEMS:  CONSTITUTIONAL: No fever  EYES: No eye pain, visual disturbances, or discharge  ENMT:  No difficulty hearing, tinnitus, vertigo; No sinus or throat pain  NECK: No pain or stiffness  RESPIRATORY: No cough, wheezing, chills or hemoptysis; No shortness of breath  CARDIOVASCULAR: No chest pain, palpitations, dizziness, or leg swelling      Vital Signs Last 24 Hrs  T(C): 36.9 (01 Nov 2024 05:02), Max: 36.9 (01 Nov 2024 05:02)  T(F): 98.4 (01 Nov 2024 05:02), Max: 98.4 (01 Nov 2024 05:02)  HR: 108 (01 Nov 2024 05:02) (89 - 108)  BP: 165/81 (01 Nov 2024 05:02) (152/82 - 187/96)  BP(mean): --  RR: 18 (01 Nov 2024 05:02) (18 - 19)  SpO2: 94% (01 Nov 2024 05:02) (90% - 96%)    Parameters below as of 01 Nov 2024 05:02  Patient On (Oxygen Delivery Method): nasal cannula        PHYSICAL EXAM:  GENERAL: NAD, comfortable   HEAD:  Atraumatic, Normocephalic  EYES: EOMI, PERRLA, conjunctiva and sclera clear  ENMT: NGT in place   NECK: Supple, No JVD, Normal thyroid  NERVOUS SYSTEM:  Alert & Oriented X3, Good concentration; Motor Strength 5/5 B/L upper and lower extremities  CHEST/LUNG: Clear to auscultation bilaterally; No rales, rhonchi, wheezing, or rubs  HEART: Regular rate and rhythm; No murmurs, rubs, or gallops  ABDOMEN: Soft, hypoactive BS   EXTREMITIES:  2+ Peripheral Pulses, No clubbing, cyanosis  LYMPH: No lymphadenopathy noted  SKIN: No rashes or lesions      LABS:                        9.9    17.28 )-----------( 352      ( 01 Nov 2024 07:30 )             30.0       Ca    7.7        31 Oct 2024 06:07        CAPILLARY BLOOD GLUCOSE        BLOOD CULTURE  10-30 @ 11:30   No growth  --  --  10-29 @ 18:20   No growth at 48 Hours  --  --  10-29 @ 18:15   No growth at 48 Hours  --  --    RADIOLOGY & ADDITIONAL TESTS:    Imaging Personally Reviewed:  [ ] YES     Consultant(s) Notes Reviewed:      Care Discussed with Consultants/Other Providers:

## 2024-11-01 NOTE — DIETITIAN INITIAL EVALUATION ADULT - PERTINENT MEDS FT
MEDICATIONS  (STANDING):  acetaminophen     Tablet .. 1000 milliGRAM(s) Oral every 6 hours  enoxaparin Injectable 40 milliGRAM(s) SubCutaneous every 24 hours  influenza  Vaccine (HIGH DOSE) 0.5 milliLiter(s) IntraMuscular once  metoprolol tartrate 100 milliGRAM(s) Oral three times a day  pantoprazole    Tablet 40 milliGRAM(s) Oral before breakfast  piperacillin/tazobactam IVPB.. 3.375 Gram(s) IV Intermittent every 8 hours  sodium chloride 0.9%. 1000 milliLiter(s) (100 mL/Hr) IV Continuous <Continuous>  valsartan 320 milliGRAM(s) Oral daily    MEDICATIONS  (PRN):  albuterol    90 MICROgram(s) HFA Inhaler 2 Puff(s) Inhalation every 6 hours PRN for bronchospasm  benzocaine 20% Spray 1 Spray(s) Topical every 4 hours PRN sore throat/nausea  ondansetron Injectable 4 milliGRAM(s) IV Push every 6 hours PRN Nausea and/or Vomiting  oxyCODONE    IR 5 milliGRAM(s) Oral every 4 hours PRN Moderate Pain (4 - 6)

## 2024-11-01 NOTE — DIETITIAN INITIAL EVALUATION ADULT - PERTINENT LABORATORY DATA
11-01    139  |  105  |  32[H]  ----------------------------<  125[H]  3.7   |  24  |  1.50[H]    Ca    8.0[L]      01 Nov 2024 07:30  Phos  2.9     11-01  Mg     2.1     11-01    TPro  6.7  /  Alb  2.3[L]  /  TBili  0.6  /  DBili  x   /  AST  21  /  ALT  20  /  AlkPhos  61  11-01  A1C with Estimated Average Glucose Result: 6.5 % (10-30-24 @ 05:15)

## 2024-11-01 NOTE — PROGRESS NOTE ADULT - SUBJECTIVE AND OBJECTIVE BOX
SUBJECTIVE:  Patient seen and examined at bedside.  No overnight events.  Patient reports discomfort from NGT. Otherwise feeling ok. Ostomy functioning. Tolerating sips of clears and ice chips. Denies nausea. Ambulating with PT. NGT with 50cc output overnight.    VITALS  Vital Signs Last 24 Hrs  T(C): 36.9 (01 Nov 2024 05:02), Max: 36.9 (01 Nov 2024 05:02)  T(F): 98.4 (01 Nov 2024 05:02), Max: 98.4 (01 Nov 2024 05:02)  HR: 108 (01 Nov 2024 05:02) (89 - 108)  BP: 165/81 (01 Nov 2024 05:02) (152/82 - 187/96)  RR: 18 (01 Nov 2024 05:02) (18 - 19)  SpO2: 94% (01 Nov 2024 05:02) (90% - 96%)    Parameters below as of 01 Nov 2024 05:02  Patient On (Oxygen Delivery Method): nasal cannula    PHYSICAL EXAM  GENERAL:  Well-nourished, well-developed female lying comfortably in bed in NAD.  HEENT:  NC/AT. Sclera white.  RESPIRATORY:  On 2L O2 via NC. Nonlabored breathing, no accessory muscle use.  ABDOMEN:  Soft, nondistended, nontender. Dressing over midline incision clean/dry. No rebound tenderness or guarding.  SKIN:  No jaundice, pallor, or cyanosis  NEURO:  A&O x 3    INTAKE & OUTPUT  I&O's Summary    31 Oct 2024 07:01  -  01 Nov 2024 07:00  --------------------------------------------------------  IN: 1300 mL / OUT: 1130 mL / NET: 170 mL    I&O's Detail    31 Oct 2024 07:01  -  01 Nov 2024 07:00  --------------------------------------------------------  IN:    sodium chloride 0.9%: 1300 mL  Total IN: 1300 mL    OUT:    Colostomy (mL): 80 mL    Indwelling Catheter - Urethral (mL): 800 mL    Nasogastric/Oral tube (mL): 250 mL  Total OUT: 1130 mL    Total NET: 170 mL    MEDICATIONS  MEDICATIONS  (STANDING):  acetaminophen   IVPB .. 1000 milliGRAM(s) IV Intermittent once  enoxaparin Injectable 40 milliGRAM(s) SubCutaneous every 24 hours  influenza  Vaccine (HIGH DOSE) 0.5 milliLiter(s) IntraMuscular once  metoprolol tartrate Injectable 5 milliGRAM(s) IV Push every 6 hours  pantoprazole  Injectable 40 milliGRAM(s) IV Push daily  piperacillin/tazobactam IVPB.. 3.375 Gram(s) IV Intermittent every 8 hours  sodium chloride 0.9%. 1000 milliLiter(s) (100 mL/Hr) IV Continuous <Continuous>    MEDICATIONS  (PRN):  albuterol    90 MICROgram(s) HFA Inhaler 2 Puff(s) Inhalation every 6 hours PRN for bronchospasm  benzocaine 20% Spray 1 Spray(s) Topical every 4 hours PRN sore throat/nausea  hydrALAZINE Injectable 10 milliGRAM(s) IV Push every 6 hours PRN SBP> 155  ondansetron Injectable 4 milliGRAM(s) IV Push every 6 hours PRN Nausea and/or Vomiting    LABS:                        9.9    17.28 )-----------( 352      ( 01 Nov 2024 07:30 )             30.0     11-01    139  |  105  |  32[H]  ----------------------------<  125[H]  3.7   |  24  |  1.50[H]    Ca    8.0[L]      01 Nov 2024 07:30  Phos  2.9     11-01  Mg     2.1     11-01    TPro  6.7  /  Alb  2.3[L]  /  TBili  0.6  /  DBili  x   /  AST  21  /  ALT  20  /  AlkPhos  61  11-01    Culture - Body Fluid with Gram Stain (collected 30 Oct 2024 11:30)  Source: Peritoneal  Gram Stain (30 Oct 2024 23:42):    polymorphonuclear leukocytes seen    No organisms seen    by cytocentrifuge  Preliminary Report (31 Oct 2024 14:44):    No growth    Urinalysis with Rflx Culture (collected 29 Oct 2024 19:10)    Culture - Blood (collected 29 Oct 2024 18:20)  Source: .Blood BLOOD  Preliminary Report (01 Nov 2024 01:02):    No growth at 48 Hours    Culture - Blood (collected 29 Oct 2024 18:15)  Source: .Blood BLOOD  Preliminary Report (01 Nov 2024 01:02):    No growth at 48 Hours    ASSESSMENT & PLAN  79 year old female admitted with perforated sigmoid diverticulitis, now POD#2 s/p ex lap with Johnathan's and drainage of intra-abdominal abscess.    - NGT removed, started on clear liquids  - Is & Os  - Wean off O2  - DVT prophylaxis with lovenox  - Continue PT; encourage ambulation  - Incentive spirometry  - Follow up AM labs  - Case to be discussed with Dr. Morales

## 2024-11-02 ENCOUNTER — TRANSCRIPTION ENCOUNTER (OUTPATIENT)
Age: 79
End: 2024-11-02

## 2024-11-02 LAB
ALBUMIN SERPL ELPH-MCNC: 2.2 G/DL — LOW (ref 3.3–5)
ALP SERPL-CCNC: 74 U/L — SIGNIFICANT CHANGE UP (ref 40–120)
ALT FLD-CCNC: 20 U/L — SIGNIFICANT CHANGE UP (ref 12–78)
ANION GAP SERPL CALC-SCNC: 7 MMOL/L — SIGNIFICANT CHANGE UP (ref 5–17)
AST SERPL-CCNC: 21 U/L — SIGNIFICANT CHANGE UP (ref 15–37)
BASOPHILS # BLD AUTO: 0.06 K/UL — SIGNIFICANT CHANGE UP (ref 0–0.2)
BASOPHILS NFR BLD AUTO: 0.5 % — SIGNIFICANT CHANGE UP (ref 0–2)
BILIRUB SERPL-MCNC: 0.8 MG/DL — SIGNIFICANT CHANGE UP (ref 0.2–1.2)
BUN SERPL-MCNC: 26 MG/DL — HIGH (ref 7–23)
CALCIUM SERPL-MCNC: 8.1 MG/DL — LOW (ref 8.5–10.1)
CHLORIDE SERPL-SCNC: 109 MMOL/L — HIGH (ref 96–108)
CO2 SERPL-SCNC: 24 MMOL/L — SIGNIFICANT CHANGE UP (ref 22–31)
CREAT SERPL-MCNC: 1.2 MG/DL — SIGNIFICANT CHANGE UP (ref 0.5–1.3)
EGFR: 46 ML/MIN/1.73M2 — LOW
EOSINOPHIL # BLD AUTO: 0.26 K/UL — SIGNIFICANT CHANGE UP (ref 0–0.5)
EOSINOPHIL NFR BLD AUTO: 2.1 % — SIGNIFICANT CHANGE UP (ref 0–6)
GLUCOSE SERPL-MCNC: 113 MG/DL — HIGH (ref 70–99)
HCT VFR BLD CALC: 27.7 % — LOW (ref 34.5–45)
HGB BLD-MCNC: 8.9 G/DL — LOW (ref 11.5–15.5)
IMM GRANULOCYTES NFR BLD AUTO: 1.3 % — HIGH (ref 0–0.9)
LYMPHOCYTES # BLD AUTO: 16.5 % — SIGNIFICANT CHANGE UP (ref 13–44)
LYMPHOCYTES # BLD AUTO: 2.01 K/UL — SIGNIFICANT CHANGE UP (ref 1–3.3)
MAGNESIUM SERPL-MCNC: 1.7 MG/DL — SIGNIFICANT CHANGE UP (ref 1.6–2.6)
MCHC RBC-ENTMCNC: 28.5 PG — SIGNIFICANT CHANGE UP (ref 27–34)
MCHC RBC-ENTMCNC: 32.1 G/DL — SIGNIFICANT CHANGE UP (ref 32–36)
MCV RBC AUTO: 88.8 FL — SIGNIFICANT CHANGE UP (ref 80–100)
MONOCYTES # BLD AUTO: 0.87 K/UL — SIGNIFICANT CHANGE UP (ref 0–0.9)
MONOCYTES NFR BLD AUTO: 7.2 % — SIGNIFICANT CHANGE UP (ref 2–14)
NEUTROPHILS # BLD AUTO: 8.79 K/UL — HIGH (ref 1.8–7.4)
NEUTROPHILS NFR BLD AUTO: 72.4 % — SIGNIFICANT CHANGE UP (ref 43–77)
NRBC # BLD: 0 /100 WBCS — SIGNIFICANT CHANGE UP (ref 0–0)
PHOSPHATE SERPL-MCNC: 2.2 MG/DL — LOW (ref 2.5–4.5)
PLATELET # BLD AUTO: 339 K/UL — SIGNIFICANT CHANGE UP (ref 150–400)
POTASSIUM SERPL-MCNC: 3.5 MMOL/L — SIGNIFICANT CHANGE UP (ref 3.5–5.3)
POTASSIUM SERPL-SCNC: 3.5 MMOL/L — SIGNIFICANT CHANGE UP (ref 3.5–5.3)
PROT SERPL-MCNC: 6.3 G/DL — SIGNIFICANT CHANGE UP (ref 6–8.3)
RBC # BLD: 3.12 M/UL — LOW (ref 3.8–5.2)
RBC # FLD: 14.2 % — SIGNIFICANT CHANGE UP (ref 10.3–14.5)
SODIUM SERPL-SCNC: 140 MMOL/L — SIGNIFICANT CHANGE UP (ref 135–145)
WBC # BLD: 12.15 K/UL — HIGH (ref 3.8–10.5)
WBC # FLD AUTO: 12.15 K/UL — HIGH (ref 3.8–10.5)

## 2024-11-02 PROCEDURE — 99232 SBSQ HOSP IP/OBS MODERATE 35: CPT

## 2024-11-02 PROCEDURE — 99233 SBSQ HOSP IP/OBS HIGH 50: CPT

## 2024-11-02 RX ORDER — HYDROMORPHONE HCL/0.9% NACL/PF 6 MG/30 ML
0.5 PATIENT CONTROLLED ANALGESIA SYRINGE INTRAVENOUS ONCE
Refills: 0 | Status: DISCONTINUED | OUTPATIENT
Start: 2024-11-02 | End: 2024-11-02

## 2024-11-02 RX ORDER — METOPROLOL TARTRATE 50 MG
100 TABLET ORAL
Refills: 0 | Status: DISCONTINUED | OUTPATIENT
Start: 2024-11-02 | End: 2024-11-02

## 2024-11-02 RX ORDER — HYDRALAZINE HYDROCHLORIDE 50 MG/1
10 TABLET, FILM COATED ORAL EVERY 8 HOURS
Refills: 0 | Status: DISCONTINUED | OUTPATIENT
Start: 2024-11-02 | End: 2024-11-02

## 2024-11-02 RX ORDER — HYDRALAZINE HYDROCHLORIDE 50 MG/1
50 TABLET, FILM COATED ORAL EVERY 8 HOURS
Refills: 0 | Status: DISCONTINUED | OUTPATIENT
Start: 2024-11-02 | End: 2024-11-02

## 2024-11-02 RX ORDER — METOPROLOL TARTRATE 50 MG
100 TABLET ORAL
Refills: 0 | Status: DISCONTINUED | OUTPATIENT
Start: 2024-11-02 | End: 2024-11-05

## 2024-11-02 RX ORDER — POTASSIUM PHOSPHATE 236; 224 MG/ML; MG/ML
30 INJECTION, SOLUTION INTRAVENOUS ONCE
Refills: 0 | Status: COMPLETED | OUTPATIENT
Start: 2024-11-02 | End: 2024-11-02

## 2024-11-02 RX ORDER — ALPRAZOLAM 0.25 MG
0.25 TABLET ORAL EVERY 6 HOURS
Refills: 0 | Status: DISCONTINUED | OUTPATIENT
Start: 2024-11-02 | End: 2024-11-05

## 2024-11-02 RX ADMIN — Medication 1000 MILLIGRAM(S): at 18:42

## 2024-11-02 RX ADMIN — PIPERACILLIN AND TAZOBACTAM 25 GRAM(S): .5; 4 INJECTION, POWDER, LYOPHILIZED, FOR SOLUTION INTRAVENOUS at 06:12

## 2024-11-02 RX ADMIN — Medication 2 PUFF(S): at 06:20

## 2024-11-02 RX ADMIN — Medication 1000 MILLIGRAM(S): at 01:56

## 2024-11-02 RX ADMIN — Medication 1000 MILLIGRAM(S): at 06:13

## 2024-11-02 RX ADMIN — Medication 1000 MILLIGRAM(S): at 13:15

## 2024-11-02 RX ADMIN — Medication 1000 MILLIGRAM(S): at 00:56

## 2024-11-02 RX ADMIN — Medication 0.5 MILLIGRAM(S): at 02:42

## 2024-11-02 RX ADMIN — POTASSIUM PHOSPHATE 83.33 MILLIMOLE(S): 236; 224 INJECTION, SOLUTION INTRAVENOUS at 10:07

## 2024-11-02 RX ADMIN — Medication 1000 MILLIGRAM(S): at 17:37

## 2024-11-02 RX ADMIN — HYDRALAZINE HYDROCHLORIDE 50 MILLIGRAM(S): 50 TABLET, FILM COATED ORAL at 07:40

## 2024-11-02 RX ADMIN — PIPERACILLIN AND TAZOBACTAM 25 GRAM(S): .5; 4 INJECTION, POWDER, LYOPHILIZED, FOR SOLUTION INTRAVENOUS at 22:18

## 2024-11-02 RX ADMIN — Medication 100 MILLIGRAM(S): at 06:13

## 2024-11-02 RX ADMIN — Medication 100 MILLIGRAM(S): at 17:37

## 2024-11-02 RX ADMIN — Medication 0.25 MILLIGRAM(S): at 08:03

## 2024-11-02 RX ADMIN — PIPERACILLIN AND TAZOBACTAM 25 GRAM(S): .5; 4 INJECTION, POWDER, LYOPHILIZED, FOR SOLUTION INTRAVENOUS at 13:38

## 2024-11-02 RX ADMIN — Medication 1000 MILLIGRAM(S): at 12:11

## 2024-11-02 RX ADMIN — VALSARTAN 320 MILLIGRAM(S): 160 TABLET ORAL at 06:13

## 2024-11-02 RX ADMIN — PANTOPRAZOLE SODIUM 40 MILLIGRAM(S): 40 TABLET, DELAYED RELEASE ORAL at 06:13

## 2024-11-02 RX ADMIN — Medication 0.5 MILLIGRAM(S): at 01:42

## 2024-11-02 RX ADMIN — Medication 40 MILLIGRAM(S): at 10:07

## 2024-11-02 NOTE — DISCHARGE NOTE PROVIDER - NSDCCPTREATMENT_GEN_ALL_CORE_FT
PRINCIPAL PROCEDURE  Procedure: Exploratory laparotomy with Johnathan procedure  Findings and Treatment:       SECONDARY PROCEDURE  Procedure: Drainage of abdominal abscess  Findings and Treatment:

## 2024-11-02 NOTE — DISCHARGE NOTE PROVIDER - HOSPITAL COURSE
79 year old female with PMHx HTN, well-controlled asthma, HLD presenting to [place] ED with abdominal pain.  Patient states abdominal pain started about 1 week ago.  She locates pain to RLQ, states is sharp in nature and at time of onset was 7/10 in intensity.  Over the past week, patient states pain has increased to now 10/10.  Patient saw PCP earlier today who advised come to ED for further evaluation.  Patient also notes fever 102 taken at home prior to arrival.  Patient states pain has radiated now into LLQ.  Patient denies change in bowel habits, chills, n/v, PO intolerance, chest pain, SOB, calf pain, palpitations.   (30 Oct 2024 00:43)    In the ED patient with VSS, leukocytosis noted to 21k. Abdominal exam with TTP RLQ/LLQ without peritoneal findings. CT findings significant for acute sigmoid diverticulitis with associated right adnexal abscess and perforation evidenced by small pneumoperitoneum.     Hospital course:    On 10/30/24, patient underwent exploratory laparotomy with Johnathan procedure and drainage of abdominal abscess. Patient was sent to PACU then to the floor for monitoring.  NGT d/c'd on 11/1, jacome catheter d/c'd 11/2.     On 11/2 midline wound was noted to have sanguinous drainage, surgicel applied with resolution.     Over the next few days patient was continued on antibiotics, given pain control. Diet was advanced appropriately until return of normal GI function was obtained as evidence by passing of flatus and BM in addition to toleration of diet. Lab values were monitored.    Disposition: Patient to follow-up with Dr. Morales as outpatient in 1 week.           79 year old female with PMHx HTN, well-controlled asthma, HLD presenting to [place] ED with abdominal pain.  Patient states abdominal pain started about 1 week ago.  She locates pain to RLQ, states is sharp in nature and at time of onset was 7/10 in intensity.  Over the past week, patient states pain has increased to now 10/10.  Patient saw PCP earlier today who advised come to ED for further evaluation.  Patient also notes fever 102 taken at home prior to arrival.  Patient states pain has radiated now into LLQ.  Patient denies change in bowel habits, chills, n/v, PO intolerance, chest pain, SOB, calf pain, palpitations.   (30 Oct 2024 00:43)    In the ED patient with VSS, leukocytosis noted to 21k. Abdominal exam with TTP RLQ/LLQ without peritoneal findings. CT findings significant for acute sigmoid diverticulitis with associated right adnexal abscess and perforation evidenced by small pneumoperitoneum.     Hospital course:    On 10/30/24, patient underwent exploratory laparotomy with Johnathan procedure and drainage of abdominal abscess. Patient was sent to PACU then to the floor for monitoring.  NGT d/c'd on 11/1, jacome catheter d/c'd 11/2.     On 11/2 midline wound was noted to have sanguinous drainage, surgicel applied with resolution.     Over the next few days patient was continued on antibiotics, given pain control. Diet was advanced appropriately until return of normal GI function was obtained as evidence by passing of flatus and BM in addition to toleration of diet. Lab values were monitored.    Pt with elevated WBC and continued on abx; CT scan without any collections or abscess. Seen by ID    Disposition: Patient to follow-up with Dr. Morales as outpatient in 1 week.           79 year old female with PMHx HTN, well-controlled asthma, HLD presenting to [place] ED with abdominal pain.  Patient states abdominal pain started about 1 week ago.  She locates pain to RLQ, states is sharp in nature and at time of onset was 7/10 in intensity.  Over the past week, patient states pain has increased to now 10/10.  Patient saw PCP earlier today who advised come to ED for further evaluation.  Patient also notes fever 102 taken at home prior to arrival.  Patient states pain has radiated now into LLQ.  Patient denies change in bowel habits, chills, n/v, PO intolerance, chest pain, SOB, calf pain, palpitations.   (30 Oct 2024 00:43)    In the ED patient with VSS, leukocytosis noted to 21k. Abdominal exam with TTP RLQ/LLQ without peritoneal findings. CT findings significant for acute sigmoid diverticulitis with associated right adnexal abscess and perforation evidenced by small pneumoperitoneum.     Hospital course:    On 10/30/24, patient underwent exploratory laparotomy with Johnathan procedure and drainage of abdominal abscess. Patient was sent to PACU then to the floor for monitoring.  NGT d/c'd on 11/1, jacome catheter d/c'd 11/2.     On 11/2 midline wound was noted to have sanguinous drainage, surgicel applied with resolution.     Over the next few days patient was continued on antibiotics, given pain control. Diet was advanced appropriately until return of normal GI function was obtained as evidence by passing of flatus and BM in addition to toleration of diet. Lab values were monitored.    Pt with elevated WBC and continued on abx; CT scan without any collections or abscess. Seen by ID and po antibiotics recommended x 2 weeks post surgery    Disposition: Patient to follow-up with Dr. Morales as outpatient in 1 week.

## 2024-11-02 NOTE — PROGRESS NOTE ADULT - ASSESSMENT
79 year old female admitted with perforated sigmoid diverticulitis, now POD#3 s/p ex lap with Johnathan's    Plan:  - will pack incision with surgicell  - I&O's  - passed TOV  - trending labs, Cr normalized, trending H/H  - IS/OOBA/LVX  - ADAT

## 2024-11-02 NOTE — DISCHARGE NOTE PROVIDER - CARE PROVIDER_API CALL
Kirt Morales [facility]  [address], [address]  [place] [address]  Phone: [phone]  Fax: [phone]  Follow Up Time:

## 2024-11-02 NOTE — DISCHARGE NOTE PROVIDER - NSDCMRMEDTOKEN_GEN_ALL_CORE_FT
Albuterol (Eqv-Proventil HFA) 90 mcg/inh inhalation aerosol: 2 inhaled as needed for  bronchospasm  atorvastatin 10 mg oral tablet: 1 tab(s) orally once a day (at bedtime)  fenofibrate 160 mg oral tablet: 1 tab(s) orally once a day  hydroCHLOROthiazide 25 mg oral tablet: 1 tab(s) orally once a day  metoprolol tartrate 100 mg oral tablet: 1 tab(s) orally 3 times a day  montelukast 10 mg oral tablet: 1 tab(s) orally once a day (at bedtime)  valsartan 320 mg oral tablet: 1 tab(s) orally once a day   acetaminophen 500 mg oral tablet: 2 tab(s) orally every 6 hours  Albuterol (Eqv-Proventil HFA) 90 mcg/inh inhalation aerosol: 2 puff(s) inhaled every 6 hours as needed for  bronchospasm  atorvastatin 10 mg oral tablet: 1 tab(s) orally once a day (at bedtime)  cefpodoxime 200 mg oral tablet: 1 tab(s) orally 2 times a day  fenofibrate 160 mg oral tablet: 1 tab(s) orally once a day  hydroCHLOROthiazide 25 mg oral tablet: 1 tab(s) orally once a day  metoprolol tartrate 100 mg oral tablet: 1 tab(s) orally 2 times a day  metroNIDAZOLE 500 mg oral tablet: 1 tab(s) orally 2 times a day  montelukast 10 mg oral tablet: 1 tab(s) orally once a day (at bedtime)  valsartan 320 mg oral tablet: 1 tab(s) orally once a day

## 2024-11-02 NOTE — DISCHARGE NOTE PROVIDER - NSDCFUSCHEDAPPT_GEN_ALL_CORE_FT
Juanpablo Shepard  [facility] Physician [facility]  CARDIOLOGY [address] [facility]  Scheduled Appointment: 11/11/2024    Stas Arreola  [facility] Physician [facility]  PULMMED [address] [facility]  Scheduled Appointment: 11/13/2024

## 2024-11-02 NOTE — PROGRESS NOTE ADULT - ASSESSMENT
79 year old female with PMHx HTN, well-controlled asthma, HLD, prediabetes presenting to [place] ED with abdominal pain, admitted for acute sigmoid diverticulitis with abscess and perforation.  Admitted for diverticulitis w/ perforation and abscess.    - Pt a/w diverticulitis w/ perforation and abscess.  - S/p Exploratory laparotomy with Johnathan procedure, drainage of abdominal abscess   - Tolerated procedure well, cardiac status optimal post operatively   - s/p NGT, diet advanced per surgery    - EKG sinus tach with PVCs, possible L atrial enlargement, rate 106, Qtc 454  - CT Coronary 2023: Calcium score 340, no significant stenosis  - No evidence of any active ischemia   - Resume home Fenofibrate 160mg qd, Atorvastatin 10mg qhs when pt tolerates PO diet    - Previous TTE  (1/2024): LVEF 67%, LVH, mild left atrial dilation.  - No meaningful evidence of volume overload.  - Creatinine:  1.50 <--1.50,  <--1.30,  <--1.30,  <--1.30  - Agree w/ IV hydration   - Strict I/Os, daily weights.    - BP elevated overnight likely reactive multifactorial (missed med dosages d/t NPO, anxiety and discomfort)   - would resume home HCTZ 25mg qd, Valsartan 320mg qd, Metoprolol tartrate 100mg TID       - Monitor and replete lytes, keep K>4, Mg>2.  - Will continue to follow.    Dave Pringle DNP, AGACNP-BC  Cardiology   Call Teams  79 year old female with PMHx HTN, well-controlled asthma, HLD, prediabetes presenting to [place] ED with abdominal pain, admitted for acute sigmoid diverticulitis with abscess and perforation.  Admitted for diverticulitis w/ perforation and abscess.    - Pt a/w diverticulitis w/ perforation and abscess.  - S/p Exploratory laparotomy with Johnathan procedure, drainage of abdominal abscess   - Tolerated procedure well, cardiac status optimal post operatively   - s/p NGT, diet advanced per surgery    - EKG sinus tach with PVCs, possible L atrial enlargement, rate 106, Qtc 454  - CT Coronary 2023: Calcium score 340, no significant stenosis  - No evidence of any active ischemia   - Resume home Fenofibrate 160mg qd, Atorvastatin 10mg qhs when pt tolerates PO diet    - Previous TTE  (1/2024): LVEF 67%, LVH, mild left atrial dilation.  - No meaningful evidence of volume overload.  - Creatinine:  1.50 <--1.50,  <--1.30,  <--1.30,  <--1.30  - Agree w/ IV hydration   - Strict I/Os, daily weights.    - BP elevated overnight likely reactive multifactorial (missed med dosages d/t NPO, anxiety and discomfort)   - would resume home HCTZ 25mg qd, Valsartan 320mg qd, Metoprolol tartrate 100mg BID       - Monitor and replete lytes, keep K>4, Mg>2.  - Will continue to follow.    Dave Pringle DNP, AGAP-BC  Cardiology   Call Teams

## 2024-11-02 NOTE — PROGRESS NOTE ADULT - PROBLEM SELECTOR PLAN 1
- s/p surgery 10/30/2024. Post Ope Ileus. NGT removed. Tolerating full liquid diet   -Darío. S/p IVF. Renal function improved   - Advance diet as tolerated per Surgery   - On  zosyn. ID following   - Monitor for bowel function  -Monitor counts, electrolytes  -Hypophosphatemia: Repleted. Check level in am

## 2024-11-02 NOTE — PROGRESS NOTE ADULT - SUBJECTIVE AND OBJECTIVE BOX
Patient is a 79y old  Female who presents with a chief complaint of abdominal pain      INTERVAL HPI/OVERNIGHT EVENTS: Patient seen and examined at bedside. Denies chest pain, sob, abdominal pain, n/v/d/c. Wants to eat regular diet.     MEDICATIONS  (STANDING):  acetaminophen     Tablet .. 1000 milliGRAM(s) Oral every 6 hours  enoxaparin Injectable 40 milliGRAM(s) SubCutaneous every 24 hours  hydrALAZINE 50 milliGRAM(s) Oral every 8 hours  influenza  Vaccine (HIGH DOSE) 0.5 milliLiter(s) IntraMuscular once  metoprolol tartrate 100 milliGRAM(s) Oral three times a day  pantoprazole    Tablet 40 milliGRAM(s) Oral before breakfast  piperacillin/tazobactam IVPB.. 3.375 Gram(s) IV Intermittent every 8 hours  potassium phosphate IVPB 30 milliMole(s) IV Intermittent once  valsartan 320 milliGRAM(s) Oral daily    MEDICATIONS  (PRN):  albuterol    90 MICROgram(s) HFA Inhaler 2 Puff(s) Inhalation every 6 hours PRN for bronchospasm  ALPRAZolam 0.25 milliGRAM(s) Oral every 6 hours PRN anxiety  benzocaine 20% Spray 1 Spray(s) Topical every 4 hours PRN sore throat/nausea  ondansetron Injectable 4 milliGRAM(s) IV Push every 6 hours PRN Nausea and/or Vomiting  oxyCODONE    IR 5 milliGRAM(s) Oral every 4 hours PRN Moderate Pain (4 - 6)      Allergies    sulfa drugs (Unknown)    Intolerances        REVIEW OF SYSTEMS:  Per HPI. All other ROS noted negative   Vital Signs Last 24 Hrs  T(C): 36.8 (02 Nov 2024 07:22), Max: 37.1 (02 Nov 2024 00:14)  T(F): 98.2 (02 Nov 2024 07:22), Max: 98.7 (02 Nov 2024 00:14)  HR: 80 (02 Nov 2024 07:22) (80 - 113)  BP: 180/89 (02 Nov 2024 07:22) (160/79 - 193/96)  BP(mean): --  RR: 18 (02 Nov 2024 07:22) (18 - 18)  SpO2: 95% (02 Nov 2024 07:22) (92% - 96%)    Parameters below as of 02 Nov 2024 07:22  Patient On (Oxygen Delivery Method): nasal cannula  O2 Flow (L/min): 2      PHYSICAL EXAM:  GENERAL: NAD, comfortable   HEAD:  Atraumatic, Normocephalic  EYES: EOMI, PERRLA, conjunctiva and sclera clear  ENMT: Moist Mucus Membranes   NECK: Supple, No JVD, Normal thyroid  NERVOUS SYSTEM:  Alert & Oriented X3, Good concentration; Motor Strength 5/5 B/L upper and lower extremities  CHEST/LUNG: Clear to auscultation bilaterally; No rales, rhonchi, wheezing, or rubs  HEART: Regular rate and rhythm; No murmurs, rubs, or gallops  ABDOMEN: Soft, + colostomy   EXTREMITIES:  2+ Peripheral Pulses, No clubbing, cyanosis  LYMPH: No lymphadenopathy noted  SKIN: No rashes or lesions      LABS:                        8.9    12.15 )-----------( 339      ( 02 Nov 2024 07:12 )             27.7     02 Nov 2024 07:12    140    |  109    |  26     ----------------------------<  113    3.5     |  24     |  1.20     Ca    8.1        02 Nov 2024 07:12  Phos  2.2       02 Nov 2024 07:12  Mg     1.7       02 Nov 2024 07:12    TPro  6.3    /  Alb  2.2    /  TBili  0.8    /  DBili  x      /  AST  21     /  ALT  20     /  AlkPhos  74     02 Nov 2024 07:12      CAPILLARY BLOOD GLUCOSE        BLOOD CULTURE  10-30 @ 11:30   No growth  --  --  10-29 @ 18:20   No growth at 72 Hours  --  --  10-29 @ 18:15   No growth at 72 Hours  --  --    RADIOLOGY & ADDITIONAL TESTS:    Imaging Personally Reviewed:  [ ] YES     Consultant(s) Notes Reviewed:      Care Discussed with Consultants/Other Providers:

## 2024-11-02 NOTE — PROGRESS NOTE ADULT - SUBJECTIVE AND OBJECTIVE BOX
[facility] -- Gerry Wiseman Pannella, Patel, Savella, Goodger, Cohen: Office # [phone]    Follow Up:  Edema, HTN    Subjective/Observations: No events overnight resting comfortably in bed.  No complaints of chest pain, dyspnea, or palpitations reported. No signs of orthopnea or PND.     REVIEW OF SYSTEMS: All other review of systems are negative unless indicated above    PAST MEDICAL & SURGICAL HISTORY:  Hypertension      Hyperlipidemia      Asthma, well controlled      Prediabetes      No significant past surgical history          MEDICATIONS  (STANDING):  acetaminophen     Tablet .. 1000 milliGRAM(s) Oral every 6 hours  enoxaparin Injectable 40 milliGRAM(s) SubCutaneous every 24 hours  influenza  Vaccine (HIGH DOSE) 0.5 milliLiter(s) IntraMuscular once  metoprolol tartrate 100 milliGRAM(s) Oral three times a day  pantoprazole    Tablet 40 milliGRAM(s) Oral before breakfast  piperacillin/tazobactam IVPB.. 3.375 Gram(s) IV Intermittent every 8 hours  valsartan 320 milliGRAM(s) Oral daily    MEDICATIONS  (PRN):  albuterol    90 MICROgram(s) HFA Inhaler 2 Puff(s) Inhalation every 6 hours PRN for bronchospasm  ALPRAZolam 0.25 milliGRAM(s) Oral every 6 hours PRN anxiety  benzocaine 20% Spray 1 Spray(s) Topical every 4 hours PRN sore throat/nausea  ondansetron Injectable 4 milliGRAM(s) IV Push every 6 hours PRN Nausea and/or Vomiting  oxyCODONE    IR 5 milliGRAM(s) Oral every 4 hours PRN Moderate Pain (4 - 6)    Allergies    sulfa drugs (Unknown)    Intolerances      Vital Signs Last 24 Hrs  T(C): 36.8 (02 Nov 2024 09:30), Max: 37.1 (02 Nov 2024 00:14)  T(F): 98.2 (02 Nov 2024 09:30), Max: 98.7 (02 Nov 2024 00:14)  HR: 89 (02 Nov 2024 09:30) (80 - 113)  BP: 115/68 (02 Nov 2024 09:30) (115/68 - 193/96)  BP(mean): --  RR: 17 (02 Nov 2024 09:30) (17 - 18)  SpO2: 94% (02 Nov 2024 09:30) (92% - 96%)    Parameters below as of 02 Nov 2024 09:30  Patient On (Oxygen Delivery Method): nasal cannula  O2 Flow (L/min): 2    I&O's Summary    01 Nov 2024 07:01  -  02 Nov 2024 07:00  --------------------------------------------------------  IN: 0 mL / OUT: 1300 mL / NET: -1300 mL          TELE: Off tele   PHYSICAL EXAM:  Constitutional: NAD, awake and alert  HEENT: Moist Mucous Membranes, Anicteric  Pulmonary: Non-labored, breath sounds are clear bilaterally, No wheezing, rales or rhonchi  Cardiovascular: Regular, S1 and S2, No murmurs, No rubs, gallops or clicks  Gastrointestinal:  soft, nontender, nondistended   Lymph: No peripheral edema. No lymphadenopathy.   Skin: No visible rashes or ulcers.  Psych:  Mood & affect appropriate      LABS: All Labs Reviewed:                        8.9    12.15 )-----------( 339      ( 02 Nov 2024 07:12 )             27.7                         9.9    17.28 )-----------( 352      ( 01 Nov 2024 07:30 )             30.0                         9.2    20.26 )-----------( 267      ( 31 Oct 2024 06:07 )             28.2     02 Nov 2024 07:12    140    |  109    |  26     ----------------------------<  113    3.5     |  24     |  1.20   01 Nov 2024 16:00    138    |  106    |  29     ----------------------------<  116    3.8     |  23     |  1.50   01 Nov 2024 07:30    139    |  105    |  32     ----------------------------<  125    3.7     |  24     |  1.50     Ca    8.1        02 Nov 2024 07:12  Ca    8.3        01 Nov 2024 16:00  Ca    8.0        01 Nov 2024 07:30  Phos  2.2       02 Nov 2024 07:12  Phos  2.9       01 Nov 2024 07:30  Phos  3.6       31 Oct 2024 06:07  Mg     1.7       02 Nov 2024 07:12  Mg     2.1       01 Nov 2024 07:30  Mg     2.0       31 Oct 2024 06:07    TPro  6.3    /  Alb  2.2    /  TBili  0.8    /  DBili  x      /  AST  21     /  ALT  20     /  AlkPhos  74     02 Nov 2024 07:12  TPro  6.7    /  Alb  2.3    /  TBili  0.6    /  DBili  x      /  AST  21     /  ALT  20     /  AlkPhos  61     01 Nov 2024 07:30   LIVER FUNCTIONS - ( 02 Nov 2024 07:12 )  Alb: 2.2 g/dL / Pro: 6.3 g/dL / ALK PHOS: 74 U/L / ALT: 20 U/L / AST: 21 U/L / GGT: x             12 Lead ECG:   Ventricular Rate 85 BPM    Atrial Rate 85 BPM    P-R Interval 148 ms    QRS Duration 84 ms    Q-T Interval 376 ms    QTC Calculation(Bazett) 447 ms    P Axis 73 degrees    R Axis 9 degrees    T Axis 44 degrees    Diagnosis Line Normal sinus rhythm  T wave abnormality, consider lateral ischemia  Abnormal ECG    Confirmed by Milka Diaz ([id number]) on 10/31/2024 1:50:09 PM (10-31-24 @ 08:23)      TRANSTHORACIC ECHOCARDIOGRAM REPORT  ________________________________________________________________________________                                      _______       Pt. Name:       VISHAL FULTON Study Date:    10/30/2024  MRN:            [id number]      YOB: 1945  Accession #:    [id number]     Age:           79 years  Account#:       [id number]    Gender:        F  Heart Rate:                   Height:        64.17 in (163.00 cm)  Rhythm:                       Weight:        207.23 lb (94.00 kg)  Blood Pressure: 127/77 mmHg   BSA/BMI:       1.99 m² / 35.38 kg/m²  ________________________________________________________________________________________  Referring Physician:    [id number] Kirt Morales  Interpreting Physician: Eliezer Doherty  Primary Sonographer:    Isidro Duque    CPT:               ECHO TTE WO CON COMP W DOPP - 07615.m  Indication(s):     Heart failure, unspecified - I50.9  Procedure:         Transthoracic echocardiogram with 2-D, M-mode and complete                     spectral and color flow Doppler.  Ordering Location: [facility]  Admission Status:  Inpatient  Study Information: Image quality for this study is technically difficult.    _______________________________________________________________________________________     CONCLUSIONS:      1. Technically difficult image quality.   2. Left ventricular endocardium is not well visualized; however, the left ventricular systolic function appears grossly normal.   3. Left ventricular systolic function is normal with an ejection fraction of 59 % by Soto's method of disks.   4. There is normal LV mass and concentric remodeling.   5. Normal right ventricular cavity size and probably normal right ventricular systolic function.   6. The right atrium is normal in size.   7. Left atrium is normal in size.   8. Mitral valve leaflets are diffusely calcified.   9. Mild mitral regurgitation.  10. Trileaflet aortic valve with normal systolic excursion. There is focal calcification of the aortic valve leaflets.  11. Estimated pulmonary artery systolic pressure is 33 mmHg, consistent with normal pulmonary artery pressure.    ________________________________________________________________________________________  FINDINGS:     Left Ventricle:  Left ventricular systolic function is normal with a calculated ejection fraction of 59 % by the Soto's biplane method of disks. There is normal LV mass and concentric remodeling. Left ventricular endocardium is not well visualized; however, the left ventricular systolic function appears grossly normal. The left ventricular diastolic function is indeterminate.     Right Ventricle:  The right ventricular cavity is normal in size and right ventricular systolic function is probably normal. Tricuspid annular plane systolic excursion (TAPSE) is 2.3 cm (normal >=1.7 cm).     Left Atrium:  The left atrium is normal in size with an indexed volume of 22.72 ml/m².     Right Atrium:  The right atrium is normal in size.     Interatrial Septum:  The interatrial septum appears intact.     Aortic Valve:  The aortic valve appears trileaflet with normal systolic excursion. There is focal calcification of the aortic valve leaflets.     Mitral Valve:  There is calcification of the mitral valve annulus. Mitral valve leaflets are diffusely calcified. There is mild mitral regurgitation.     Tricuspid Valve:  There is mild tricuspid regurgitation. Estimated pulmonary artery systolic pressure is 33 mmHg, consistent with normal pulmonary artery pressure.     Pulmonic Valve:  The pulmonic valve was not well visualized.     Aorta:  The aortic root at the sinuses of Valsalva is normal in size, measuring 2.60 cm (indexed 1.31 cm/m²). The ascending aorta is normal in size, measuring 2.30 cm (indexed 1.16 cm/m²).     Systemic Veins:  The inferior vena cava is dilated measuring 2.49 cm in diameter, (dilated >2.1cm) with normal inspiratory collapse (normal >50%) consistent with mildly elevated right atrial pressure (~8, range 5-10mmHg).  ____________________________________________________________________  QUANTITATIVE DATA:  Left Ventricle Measurements: (Indexed to BSA)     IVSd (2D):   1.0 cm  LVPWd (2D):  1.0 cm  LVIDd (2D):  4.1 cm  LVIDs (2D):  2.4 cm  LV Mass:     137 g  69.0 g/m²  LV Vol d, MOD A2C:41.9 ml 21.06 ml/m²  LV Vol d, MOD A4C: 51.0 ml 25.63 ml/m²  LV Vol d, MOD BP:  46.0 ml 23.12 ml/m²  LV Vol s, MOD A2C: 16.5 ml 8.29 ml/m²  LV Vol s, MOD A4C: 21.0 ml 10.55 ml/m²  LV Vol s, MOD BP:  18.8 ml 9.44 ml/m²  LVOT SV MOD BP:    27.2 ml  LV EF% MOD BP:     59 %     MV E Vmax:    1.13 m/s  MV A Vmax:    1.06 m/s  MV E/A:       1.07  e' lateral:   10.40 cm/s  e' medial:    8.81 cm/s  E/e' lateral: 10.87  E/e' medial:  12.83  E/e' Average: 11.76  MV DT:        201 msec    Aorta Measurements: (Normal range) (Indexed to BSA)     Ao Root d     2.60 cm (2.7 - 3.3 cm) 1.31 cm/m²  Ao Asc d, 2D: 2.30  Ao Asc prox:  2.30 cm                1.16 cm/m²            Left Atrium Measurements: (Indexed to BSA)  LA Diam 2D: 3.90 cm         Right Ventricle Measurements:     TAPSE:            2.3 cm  RV Base (RVID1):  3.2 cm  RV Mid (RVID2):   2.2 cm  RV Major (RVID3): 5.8 cm       LVOT / RVOT/ Qp/Qs Data: (Indexed to BSA)  LVOT Diameter: 2.00 cm  LVOT Area:     3.14 cm²    Mitral Valve Measurements:     MV E Vmax: 1.1 m/s         MR Vmax:          4.81 m/s  MV A Vmax: 1.1 m/s         MR Peak Gradient: 92.5 mmHg  MV E/A:    1.1       Tricuspid Valve Measurements:     TR Vmax:          2.5 m/s  TR Peak Gradient: 24.6 mmHg  RA Pressure:      8 mmHg  PASP:             33 mmHg    ________________________________________________________________________________________  Electronically signed on 10/31/2024 at 1:33:33 PM by Eliezer Doherty         *** Final ***

## 2024-11-02 NOTE — CHART NOTE - NSCHARTNOTEFT_GEN_A_CORE
RN reported patient received hydralazine dose and her blood pressure dropped to 115/68. Patient seen and examined at bedside. Awake, alert. Feeling better. No dizziness, chest pain, palpitations noted. Will d/c Hydralazine for now and monitor Vitals closely. Patient tolerating full liquid diet.

## 2024-11-02 NOTE — PROGRESS NOTE ADULT - SUBJECTIVE AND OBJECTIVE BOX
POD #3: Johnathan's  O/N: HTN and bleeding from midline     SUBJECTIVE:  Patient seen and examined at bedside this morning, denies any acute complaints, reports that she was bleeding from her midline this morning, incision was packed and dressing was replaced, now with some positional discomfort at the incision. Reports that she is anxious, usually takes xanax at home PRN. Denies any nausea, vomiting, CP, SOB, fever or chills. OOBA, tolerating diet, ostomy with some gas and blood, states that she is still uncomfortable managing the appliance on her own .    MEDICATIONS  (STANDING):  acetaminophen     Tablet .. 1000 milliGRAM(s) Oral every 6 hours  enoxaparin Injectable 40 milliGRAM(s) SubCutaneous every 24 hours  hydrALAZINE 50 milliGRAM(s) Oral every 8 hours  influenza  Vaccine (HIGH DOSE) 0.5 milliLiter(s) IntraMuscular once  metoprolol tartrate 100 milliGRAM(s) Oral three times a day  pantoprazole    Tablet 40 milliGRAM(s) Oral before breakfast  piperacillin/tazobactam IVPB.. 3.375 Gram(s) IV Intermittent every 8 hours  potassium phosphate IVPB 30 milliMole(s) IV Intermittent once  valsartan 320 milliGRAM(s) Oral daily    MEDICATIONS  (PRN):  albuterol    90 MICROgram(s) HFA Inhaler 2 Puff(s) Inhalation every 6 hours PRN for bronchospasm  ALPRAZolam 0.25 milliGRAM(s) Oral every 6 hours PRN anxiety  benzocaine 20% Spray 1 Spray(s) Topical every 4 hours PRN sore throat/nausea  ondansetron Injectable 4 milliGRAM(s) IV Push every 6 hours PRN Nausea and/or Vomiting  oxyCODONE    IR 5 milliGRAM(s) Oral every 4 hours PRN Moderate Pain (4 - 6)      Vital Signs Last 24 Hrs  T(C): 36.8 (02 Nov 2024 07:22), Max: 37.1 (02 Nov 2024 00:14)  T(F): 98.2 (02 Nov 2024 07:22), Max: 98.7 (02 Nov 2024 00:14)  HR: 80 (02 Nov 2024 07:22) (80 - 113)  BP: 180/89 (02 Nov 2024 07:22) (160/79 - 193/96)  BP(mean): --  RR: 18 (02 Nov 2024 07:22) (18 - 18)  SpO2: 95% (02 Nov 2024 07:22) (92% - 96%)    Parameters below as of 02 Nov 2024 07:22  Patient On (Oxygen Delivery Method): nasal cannula  O2 Flow (L/min): 2      PHYSICAL EXAM:  Constitutional: AAOx3, no acute distress  HEENT: NCAT, airway patent  Cardiovascular: RRR, pulses present bilaterally  Respiratory: nonlabored breathing  Gastrointestinal: abdomen soft, nontender, non distended, no rebound or guarding, no palpable masses, incision with some sanguinous staining on the dressing  Neuro: no focal deficits  Extremities: non edematous, no calf pain bilaterally     I&O's Detail    01 Nov 2024 07:01  -  02 Nov 2024 07:00  --------------------------------------------------------  IN:  Total IN: 0 mL    OUT:    Indwelling Catheter - Urethral (mL): 1300 mL  Total OUT: 1300 mL    Total NET: -1300 mL          LABS:                        8.9    12.15 )-----------( 339      ( 02 Nov 2024 07:12 )             27.7     11-02    140  |  109[H]  |  26[H]  ----------------------------<  113[H]  3.5   |  24  |  1.20    Ca    8.1[L]      02 Nov 2024 07:12  Phos  2.2     11-02  Mg     1.7     11-02    TPro  6.3  /  Alb  2.2[L]  /  TBili  0.8  /  DBili  x   /  AST  21  /  ALT  20  /  AlkPhos  74  11-02      Urinalysis Basic - ( 02 Nov 2024 07:12 )    Color: x / Appearance: x / SG: x / pH: x  Gluc: 113 mg/dL / Ketone: x  / Bili: x / Urobili: x   Blood: x / Protein: x / Nitrite: x   Leuk Esterase: x / RBC: x / WBC x   Sq Epi: x / Non Sq Epi: x / Bacteria: x

## 2024-11-03 LAB
ANION GAP SERPL CALC-SCNC: 6 MMOL/L — SIGNIFICANT CHANGE UP (ref 5–17)
BASOPHILS # BLD AUTO: 0.07 K/UL — SIGNIFICANT CHANGE UP (ref 0–0.2)
BASOPHILS NFR BLD AUTO: 0.6 % — SIGNIFICANT CHANGE UP (ref 0–2)
BUN SERPL-MCNC: 28 MG/DL — HIGH (ref 7–23)
CALCIUM SERPL-MCNC: 8.5 MG/DL — SIGNIFICANT CHANGE UP (ref 8.5–10.1)
CHLORIDE SERPL-SCNC: 109 MMOL/L — HIGH (ref 96–108)
CO2 SERPL-SCNC: 26 MMOL/L — SIGNIFICANT CHANGE UP (ref 22–31)
CREAT SERPL-MCNC: 1.2 MG/DL — SIGNIFICANT CHANGE UP (ref 0.5–1.3)
EGFR: 46 ML/MIN/1.73M2 — LOW
EOSINOPHIL # BLD AUTO: 0.34 K/UL — SIGNIFICANT CHANGE UP (ref 0–0.5)
EOSINOPHIL NFR BLD AUTO: 2.7 % — SIGNIFICANT CHANGE UP (ref 0–6)
GLUCOSE SERPL-MCNC: 148 MG/DL — HIGH (ref 70–99)
HCT VFR BLD CALC: 28.3 % — LOW (ref 34.5–45)
HGB BLD-MCNC: 9.1 G/DL — LOW (ref 11.5–15.5)
IMM GRANULOCYTES NFR BLD AUTO: 2.7 % — HIGH (ref 0–0.9)
LYMPHOCYTES # BLD AUTO: 19.8 % — SIGNIFICANT CHANGE UP (ref 13–44)
LYMPHOCYTES # BLD AUTO: 2.46 K/UL — SIGNIFICANT CHANGE UP (ref 1–3.3)
MAGNESIUM SERPL-MCNC: 2.1 MG/DL — SIGNIFICANT CHANGE UP (ref 1.6–2.6)
MCHC RBC-ENTMCNC: 28.8 PG — SIGNIFICANT CHANGE UP (ref 27–34)
MCHC RBC-ENTMCNC: 32.2 G/DL — SIGNIFICANT CHANGE UP (ref 32–36)
MCV RBC AUTO: 89.6 FL — SIGNIFICANT CHANGE UP (ref 80–100)
MONOCYTES # BLD AUTO: 0.86 K/UL — SIGNIFICANT CHANGE UP (ref 0–0.9)
MONOCYTES NFR BLD AUTO: 6.9 % — SIGNIFICANT CHANGE UP (ref 2–14)
NEUTROPHILS # BLD AUTO: 8.34 K/UL — HIGH (ref 1.8–7.4)
NEUTROPHILS NFR BLD AUTO: 67.3 % — SIGNIFICANT CHANGE UP (ref 43–77)
NRBC # BLD: 0 /100 WBCS — SIGNIFICANT CHANGE UP (ref 0–0)
PHOSPHATE SERPL-MCNC: 2.4 MG/DL — LOW (ref 2.5–4.5)
PLATELET # BLD AUTO: 361 K/UL — SIGNIFICANT CHANGE UP (ref 150–400)
POTASSIUM SERPL-MCNC: 3.5 MMOL/L — SIGNIFICANT CHANGE UP (ref 3.5–5.3)
POTASSIUM SERPL-SCNC: 3.5 MMOL/L — SIGNIFICANT CHANGE UP (ref 3.5–5.3)
RBC # BLD: 3.16 M/UL — LOW (ref 3.8–5.2)
RBC # FLD: 14.4 % — SIGNIFICANT CHANGE UP (ref 10.3–14.5)
SODIUM SERPL-SCNC: 141 MMOL/L — SIGNIFICANT CHANGE UP (ref 135–145)
WBC # BLD: 12.41 K/UL — HIGH (ref 3.8–10.5)
WBC # FLD AUTO: 12.41 K/UL — HIGH (ref 3.8–10.5)

## 2024-11-03 PROCEDURE — 99233 SBSQ HOSP IP/OBS HIGH 50: CPT

## 2024-11-03 RX ORDER — DIBASIC SODIUM PHOSPHATE, MONOBASIC POTASSIUM PHOSPHATE AND MONOBASIC SODIUM PHOSPHATE 852; 155; 130 MG/1; MG/1; MG/1
1 TABLET ORAL EVERY 6 HOURS
Refills: 0 | Status: COMPLETED | OUTPATIENT
Start: 2024-11-03 | End: 2024-11-03

## 2024-11-03 RX ORDER — FENOFIBRATE 145 MG/1
145 TABLET, FILM COATED ORAL DAILY
Refills: 0 | Status: DISCONTINUED | OUTPATIENT
Start: 2024-11-03 | End: 2024-11-05

## 2024-11-03 RX ADMIN — Medication 1000 MILLIGRAM(S): at 01:42

## 2024-11-03 RX ADMIN — DIBASIC SODIUM PHOSPHATE, MONOBASIC POTASSIUM PHOSPHATE AND MONOBASIC SODIUM PHOSPHATE 1 PACKET(S): 852; 155; 130 TABLET ORAL at 18:43

## 2024-11-03 RX ADMIN — PIPERACILLIN AND TAZOBACTAM 25 GRAM(S): .5; 4 INJECTION, POWDER, LYOPHILIZED, FOR SOLUTION INTRAVENOUS at 22:00

## 2024-11-03 RX ADMIN — Medication 1000 MILLIGRAM(S): at 19:20

## 2024-11-03 RX ADMIN — PIPERACILLIN AND TAZOBACTAM 25 GRAM(S): .5; 4 INJECTION, POWDER, LYOPHILIZED, FOR SOLUTION INTRAVENOUS at 06:53

## 2024-11-03 RX ADMIN — VALSARTAN 320 MILLIGRAM(S): 160 TABLET ORAL at 06:54

## 2024-11-03 RX ADMIN — DIBASIC SODIUM PHOSPHATE, MONOBASIC POTASSIUM PHOSPHATE AND MONOBASIC SODIUM PHOSPHATE 1 PACKET(S): 852; 155; 130 TABLET ORAL at 23:47

## 2024-11-03 RX ADMIN — Medication 40 MILLIGRAM(S): at 09:14

## 2024-11-03 RX ADMIN — PIPERACILLIN AND TAZOBACTAM 25 GRAM(S): .5; 4 INJECTION, POWDER, LYOPHILIZED, FOR SOLUTION INTRAVENOUS at 17:21

## 2024-11-03 RX ADMIN — Medication 10 MILLIGRAM(S): at 23:53

## 2024-11-03 RX ADMIN — Medication 1000 MILLIGRAM(S): at 13:34

## 2024-11-03 RX ADMIN — Medication 100 MILLIGRAM(S): at 06:54

## 2024-11-03 RX ADMIN — Medication 100 MILLIGRAM(S): at 18:42

## 2024-11-03 RX ADMIN — Medication 1000 MILLIGRAM(S): at 01:12

## 2024-11-03 RX ADMIN — Medication 1000 MILLIGRAM(S): at 18:43

## 2024-11-03 RX ADMIN — Medication 1000 MILLIGRAM(S): at 06:55

## 2024-11-03 RX ADMIN — PANTOPRAZOLE SODIUM 40 MILLIGRAM(S): 40 TABLET, DELAYED RELEASE ORAL at 06:54

## 2024-11-03 NOTE — PROGRESS NOTE ADULT - SUBJECTIVE AND OBJECTIVE BOX
[facility] -- Gerry Wiseman Pannella, Patel, Savella, Goodger, Cohen: Office # [phone]    Follow Up:  Edema, HTN    Subjective/Observations: No events overnight resting comfortably in bed.  No complaints of chest pain, dyspnea, or palpitations reported. No signs of orthopnea or PND.     REVIEW OF SYSTEMS: All other review of systems are negative unless indicated above    PAST MEDICAL & SURGICAL HISTORY:  Hypertension      Hyperlipidemia      Asthma, well controlled      Prediabetes      No significant past surgical history          MEDICATIONS  (STANDING):  acetaminophen     Tablet .. 1000 milliGRAM(s) Oral every 6 hours  enoxaparin Injectable 40 milliGRAM(s) SubCutaneous every 24 hours  hydrochlorothiazide 25 milliGRAM(s) Oral daily  influenza  Vaccine (HIGH DOSE) 0.5 milliLiter(s) IntraMuscular once  metoprolol tartrate 100 milliGRAM(s) Oral two times a day  pantoprazole    Tablet 40 milliGRAM(s) Oral before breakfast  piperacillin/tazobactam IVPB.. 3.375 Gram(s) IV Intermittent every 8 hours  valsartan 320 milliGRAM(s) Oral daily    MEDICATIONS  (PRN):  albuterol    90 MICROgram(s) HFA Inhaler 2 Puff(s) Inhalation every 6 hours PRN for bronchospasm  ALPRAZolam 0.25 milliGRAM(s) Oral every 6 hours PRN anxiety  benzocaine 20% Spray 1 Spray(s) Topical every 4 hours PRN sore throat/nausea  ondansetron Injectable 4 milliGRAM(s) IV Push every 6 hours PRN Nausea and/or Vomiting  oxyCODONE    IR 5 milliGRAM(s) Oral every 4 hours PRN Moderate Pain (4 - 6)    Allergies    sulfa drugs (Unknown)    Intolerances      Vital Signs Last 24 Hrs  T(C): 36.6 (03 Nov 2024 06:21), Max: 36.8 (02 Nov 2024 19:45)  T(F): 97.8 (03 Nov 2024 06:21), Max: 98.3 (02 Nov 2024 19:45)  HR: 91 (03 Nov 2024 06:21) (88 - 92)  BP: 167/82 (03 Nov 2024 06:21) (151/83 - 168/80)  BP(mean): --  RR: 18 (03 Nov 2024 06:21) (18 - 18)  SpO2: 97% (03 Nov 2024 06:21) (96% - 97%)    Parameters below as of 03 Nov 2024 06:21  Patient On (Oxygen Delivery Method): nasal cannula  O2 Flow (L/min): 3    I&O's Summary    02 Nov 2024 08:01  -  03 Nov 2024 07:00  --------------------------------------------------------  IN: 0 mL / OUT: 600 mL / NET: -600 mL          TELE: Off tele  PHYSICAL EXAM:  Constitutional: NAD, awake and alert  HEENT: Moist Mucous Membranes, Anicteric  Pulmonary: Non-labored, breath sounds are clear bilaterally, No wheezing, rales or rhonchi  Cardiovascular: Regular, S1 and S2, No murmurs, No rubs, gallops or clicks  Gastrointestinal:  soft, nontender, nondistended   Lymph: + peripheral edema. No lymphadenopathy.   Skin: No visible rashes or ulcers.  Psych:  Mood & affect appropriate      LABS: All Labs Reviewed:                        9.1    12.41 )-----------( 361      ( 03 Nov 2024 09:45 )             28.3                         8.9    12.15 )-----------( 339      ( 02 Nov 2024 07:12 )             27.7                         9.9    17.28 )-----------( 352      ( 01 Nov 2024 07:30 )             30.0     02 Nov 2024 07:12    140    |  109    |  26     ----------------------------<  113    3.5     |  24     |  1.20   01 Nov 2024 16:00    138    |  106    |  29     ----------------------------<  116    3.8     |  23     |  1.50   01 Nov 2024 07:30    139    |  105    |  32     ----------------------------<  125    3.7     |  24     |  1.50     Ca    8.1        02 Nov 2024 07:12  Ca    8.3        01 Nov 2024 16:00  Ca    8.0        01 Nov 2024 07:30  Phos  2.2       02 Nov 2024 07:12  Phos  2.9       01 Nov 2024 07:30  Mg     1.7       02 Nov 2024 07:12  Mg     2.1       01 Nov 2024 07:30    TPro  6.3    /  Alb  2.2    /  TBili  0.8    /  DBili  x      /  AST  21     /  ALT  20     /  AlkPhos  74     02 Nov 2024 07:12  TPro  6.7    /  Alb  2.3    /  TBili  0.6    /  DBili  x      /  AST  21     /  ALT  20     /  AlkPhos  61     01 Nov 2024 07:30   LIVER FUNCTIONS - ( 02 Nov 2024 07:12 )  Alb: 2.2 g/dL / Pro: 6.3 g/dL / ALK PHOS: 74 U/L / ALT: 20 U/L / AST: 21 U/L / GGT: x             12 Lead ECG:   Ventricular Rate 85 BPM    Atrial Rate 85 BPM    P-R Interval 148 ms    QRS Duration 84 ms    Q-T Interval 376 ms    QTC Calculation(Bazett) 447 ms    P Axis 73 degrees    R Axis 9 degrees    T Axis 44 degrees    Diagnosis Line Normal sinus rhythm  T wave abnormality, consider lateral ischemia  Abnormal ECG    Confirmed by Milka Diaz ([id number]) on 10/31/2024 1:50:09 PM (10-31-24 @ 08:23)      TRANSTHORACIC ECHOCARDIOGRAM REPORT  ________________________________________________________________________________                                      _______       Pt. Name:       VISHAL FULTON Study Date:    10/30/2024  MRN:            [id number]      YOB: 1945  Accession #:    [id number]     Age:           79 years  Account#:       [id number]    Gender:        F  Heart Rate:                   Height:        64.17 in (163.00 cm)  Rhythm:                       Weight:        207.23 lb (94.00 kg)  Blood Pressure: 127/77 mmHg   BSA/BMI:       1.99 m² / 35.38 kg/m²  ________________________________________________________________________________________  Referring Physician:    [id number] Kirt Morales  Interpreting Physician: Eliezer Doherty  Primary Sonographer:    Isidro Duque    CPT:               ECHO TTE WO CON COMP W DOPP - 65554.m  Indication(s):     Heart failure, unspecified - I50.9  Procedure:         Transthoracic echocardiogram with 2-D, M-mode and complete                     spectral and color flow Doppler.  Ordering Location: [facility]  Admission Status:  Inpatient  Study Information: Image quality for this study is technically difficult.    _______________________________________________________________________________________     CONCLUSIONS:      1. Technically difficult image quality.   2. Left ventricular endocardium is not well visualized; however, the left ventricular systolic function appears grossly normal.   3. Left ventricular systolic function is normal with an ejection fraction of 59 % by Soto's method of disks.   4. There is normal LV mass and concentric remodeling.   5. Normal right ventricular cavity size and probably normal right ventricular systolic function.   6. The right atrium is normal in size.   7. Left atrium is normal in size.   8. Mitral valve leaflets are diffusely calcified.   9. Mild mitral regurgitation.  10. Trileaflet aortic valve with normal systolic excursion. There is focal calcification of the aortic valve leaflets.  11. Estimated pulmonary artery systolic pressure is 33 mmHg, consistent with normal pulmonary artery pressure.    ________________________________________________________________________________________  FINDINGS:     Left Ventricle:  Left ventricular systolic function is normal with a calculated ejection fraction of 59 % by the Soto's biplane method of disks. There is normal LV mass and concentric remodeling. Left ventricular endocardium is not well visualized; however, the left ventricular systolic function appears grossly normal. The left ventricular diastolic function is indeterminate.     Right Ventricle:  The right ventricular cavity is normal in size and right ventricular systolic function is probably normal. Tricuspid annular plane systolic excursion (TAPSE) is 2.3 cm (normal >=1.7 cm).     Left Atrium:  The left atrium is normal in size with an indexed volume of 22.72 ml/m².     Right Atrium:  The right atrium is normal in size.     Interatrial Septum:  The interatrial septum appears intact.     Aortic Valve:  The aortic valve appears trileaflet with normal systolic excursion. There is focal calcification of the aortic valve leaflets.     Mitral Valve:  There is calcification of the mitral valve annulus. Mitral valve leaflets are diffusely calcified. There is mild mitral regurgitation.     Tricuspid Valve:  There is mild tricuspid regurgitation. Estimated pulmonary artery systolic pressure is 33 mmHg, consistent with normal pulmonary artery pressure.     Pulmonic Valve:  The pulmonic valve was not well visualized.     Aorta:  The aortic root at the sinuses of Valsalva is normal in size, measuring 2.60 cm (indexed 1.31 cm/m²). The ascending aorta is normal in size, measuring 2.30 cm (indexed 1.16 cm/m²).     Systemic Veins:  The inferior vena cava is dilated measuring 2.49 cm in diameter, (dilated >2.1cm) with normal inspiratory collapse (normal >50%) consistent with mildly elevated right atrial pressure (~8, range 5-10mmHg).  ____________________________________________________________________  QUANTITATIVE DATA:  Left Ventricle Measurements: (Indexed to BSA)     IVSd (2D):   1.0 cm  LVPWd (2D):  1.0 cm  LVIDd (2D):  4.1 cm  LVIDs (2D):  2.4 cm  LV Mass:     137 g  69.0 g/m²  LV Vol d, MOD A2C:41.9 ml 21.06 ml/m²  LV Vol d, MOD A4C: 51.0 ml 25.63 ml/m²  LV Vol d, MOD BP:  46.0 ml 23.12 ml/m²  LV Vol s, MOD A2C: 16.5 ml 8.29 ml/m²  LV Vol s, MOD A4C: 21.0 ml 10.55 ml/m²  LV Vol s, MOD BP:  18.8 ml 9.44 ml/m²  LVOT SV MOD BP:    27.2 ml  LV EF% MOD BP:     59 %     MV E Vmax:    1.13 m/s  MV A Vmax:    1.06 m/s  MV E/A:       1.07  e' lateral:   10.40 cm/s  e' medial:    8.81 cm/s  E/e' lateral: 10.87  E/e' medial:  12.83  E/e' Average: 11.76  MV DT:        201 msec    Aorta Measurements: (Normal range) (Indexed to BSA)     Ao Root d     2.60 cm (2.7 - 3.3 cm) 1.31 cm/m²  Ao Asc d, 2D: 2.30  Ao Asc prox:  2.30 cm                1.16 cm/m²            Left Atrium Measurements: (Indexed to BSA)  LA Diam 2D: 3.90 cm         Right Ventricle Measurements:     TAPSE:            2.3 cm  RV Base (RVID1):  3.2 cm  RV Mid (RVID2):   2.2 cm  RV Major (RVID3): 5.8 cm       LVOT / RVOT/ Qp/Qs Data: (Indexed to BSA)  LVOT Diameter: 2.00 cm  LVOT Area:     3.14 cm²    Mitral Valve Measurements:     MV E Vmax: 1.1 m/s         MR Vmax:          4.81 m/s  MV A Vmax: 1.1 m/s         MR Peak Gradient: 92.5 mmHg  MV E/A:    1.1       Tricuspid Valve Measurements:     TR Vmax:          2.5 m/s  TR Peak Gradient: 24.6 mmHg  RA Pressure:      8 mmHg  PASP:             33 mmHg    ________________________________________________________________________________________  Electronically signed on 10/31/2024 at 1:33:33 PM by Eliezer Doherty         *** Final ***

## 2024-11-03 NOTE — PROGRESS NOTE ADULT - PROBLEM SELECTOR PLAN 1
- s/p surgery 10/30/2024. Post Ope Ileus. NGT removed. Tolerating full liquid diet   -Darío. S/p IVF. Renal function improved   - On Low fiber  diet, tolerating   - On  zosyn. ID following, plan to switch to oral antibiotics to complete the course per ID   - Monitor for bowel function  -Monitor counts, electrolytes  -Hypophosphatemia: Replete as needed   -Patient is medically stable for discharge home vs [facility]

## 2024-11-03 NOTE — PROGRESS NOTE ADULT - ASSESSMENT
79 year old female admitted with perforated sigmoid diverticulitis, now POD#4 s/p ex lap with Johnathan's    Plan:  - daily packing   - I&O's  - trending labs, Cr normalized, trending H/H  - IS/OOBA/LVX  - Dispo planning with home care

## 2024-11-03 NOTE — PROGRESS NOTE ADULT - ASSESSMENT
79 year old female with PMHx HTN, well-controlled asthma, HLD, prediabetes presenting to [facility] with abdominal pain, admitted for acute sigmoid diverticulitis with abscess and perforation.  Admitted for diverticulitis w/ perforation and abscess.    - Pt a/w diverticulitis w/ perforation and abscess.  - S/p Exploratory laparotomy with Johnathan procedure, drainage of abdominal abscess   - Tolerated procedure well, cardiac status optimal post operatively   - s/p NGT, diet advanced per surgery    - EKG sinus tach with PVCs, possible L atrial enlargement, rate 106, Qtc 454  - CT Coronary 2023: Calcium score 340, no significant stenosis  - No evidence of any active ischemia   - Resume home Fenofibrate 160mg qd, Atorvastatin 10mg qhs when pt tolerates PO diet    - Previous TTE  (1/2024): LVEF 67%, LVH, mild left atrial dilation.  -S/P IV hydration for elevated creatinine  -w/ some dependent edema on exam   - labs pending   -If creat stable can give lasix 20 mg IV x1    - Strict I/Os, daily weights.    - BP elevated overnight likely reactive multifactorial (missed med dosages d/t NPO, anxiety and discomfort)   -  home Valsartan 320mg qd, Metoprolol tartrate 100mg BID resumed  - resume HCTZ 25mg qd first dose pending     - Monitor and replete lytes, keep K>4, Mg>2.  - Will continue to follow.    Dave Pringle DNP, AGACNP-BC  Cardiology   Call Teams

## 2024-11-03 NOTE — PROGRESS NOTE ADULT - SUBJECTIVE AND OBJECTIVE BOX
Patient is a 79y old  Female who presents with a chief complaint of diverticulitis w/ perforation and abscess (02 Nov 2024 21:04)       INTERVAL HPI/OVERNIGHT EVENTS: Patient seen and examined at bedside. Denies any new symptoms, complaints. No chest pain, palpitations, sob, n/v/d/c     MEDICATIONS  (STANDING):  acetaminophen     Tablet .. 1000 milliGRAM(s) Oral every 6 hours  enoxaparin Injectable 40 milliGRAM(s) SubCutaneous every 24 hours  hydrochlorothiazide 25 milliGRAM(s) Oral daily  influenza  Vaccine (HIGH DOSE) 0.5 milliLiter(s) IntraMuscular once  metoprolol tartrate 100 milliGRAM(s) Oral two times a day  pantoprazole    Tablet 40 milliGRAM(s) Oral before breakfast  piperacillin/tazobactam IVPB.. 3.375 Gram(s) IV Intermittent every 8 hours  valsartan 320 milliGRAM(s) Oral daily    MEDICATIONS  (PRN):  albuterol    90 MICROgram(s) HFA Inhaler 2 Puff(s) Inhalation every 6 hours PRN for bronchospasm  ALPRAZolam 0.25 milliGRAM(s) Oral every 6 hours PRN anxiety  benzocaine 20% Spray 1 Spray(s) Topical every 4 hours PRN sore throat/nausea  ondansetron Injectable 4 milliGRAM(s) IV Push every 6 hours PRN Nausea and/or Vomiting  oxyCODONE    IR 5 milliGRAM(s) Oral every 4 hours PRN Moderate Pain (4 - 6)      Allergies    sulfa drugs (Unknown)    Intolerances        REVIEW OF SYSTEMS:  Per HPI. All other ROS noted negative   Vital Signs Last 24 Hrs  T(C): 36.6 (03 Nov 2024 06:21), Max: 36.8 (02 Nov 2024 09:30)  T(F): 97.8 (03 Nov 2024 06:21), Max: 98.3 (02 Nov 2024 19:45)  HR: 91 (03 Nov 2024 06:21) (88 - 92)  BP: 167/82 (03 Nov 2024 06:21) (115/68 - 168/80)  BP(mean): --  RR: 18 (03 Nov 2024 06:21) (17 - 18)  SpO2: 97% (03 Nov 2024 06:21) (94% - 97%)    Parameters below as of 03 Nov 2024 06:21  Patient On (Oxygen Delivery Method): nasal cannula  O2 Flow (L/min): 3      PHYSICAL EXAM:  GENERAL: NAD, comfortable   HEAD:  Atraumatic, Normocephalic  EYES: EOMI, PERRLA, conjunctiva and sclera clear  ENMT: Moist Mucus Membranes   NECK: Supple, No JVD, Normal thyroid  NERVOUS SYSTEM:  Alert & Oriented X3, Good concentration; Motor Strength 5/5 B/L upper and lower extremities  CHEST/LUNG: Clear to auscultation bilaterally; No rales, rhonchi, wheezing, or rubs  HEART: Regular rate and rhythm; No murmurs, rubs, or gallops  ABDOMEN: Soft, + colostomy   EXTREMITIES:  2+ Peripheral Pulses, No clubbing, cyanosis  LYMPH: No lymphadenopathy noted  SKIN: No rashes or lesions    LABS:      Ca    8.1        02 Nov 2024 07:12        CAPILLARY BLOOD GLUCOSE        BLOOD CULTURE  10-30 @ 11:30   No growth  --  --    RADIOLOGY & ADDITIONAL TESTS:    Imaging Personally Reviewed:  [ ] YES     Consultant(s) Notes Reviewed:      Care Discussed with Consultants/Other Providers:

## 2024-11-03 NOTE — PROGRESS NOTE ADULT - SUBJECTIVE AND OBJECTIVE BOX
POD #4: Johnathan's   O/N: SBP still elevated, anxious about going home     SUBJECTIVE:  Patient seen and examined at bedside this morning, denies any acute complaints, reports that she is anxious about going home with the new appliance and she does not feel ready yet. She denies any nausea, vomiting, CP, SOB fever or chills, OOBA, tolerating diet with ostomy function.     MEDICATIONS  (STANDING):  acetaminophen     Tablet .. 1000 milliGRAM(s) Oral every 6 hours  enoxaparin Injectable 40 milliGRAM(s) SubCutaneous every 24 hours  hydrochlorothiazide 25 milliGRAM(s) Oral daily  influenza  Vaccine (HIGH DOSE) 0.5 milliLiter(s) IntraMuscular once  metoprolol tartrate 100 milliGRAM(s) Oral two times a day  pantoprazole    Tablet 40 milliGRAM(s) Oral before breakfast  piperacillin/tazobactam IVPB.. 3.375 Gram(s) IV Intermittent every 8 hours  valsartan 320 milliGRAM(s) Oral daily    MEDICATIONS  (PRN):  albuterol    90 MICROgram(s) HFA Inhaler 2 Puff(s) Inhalation every 6 hours PRN for bronchospasm  ALPRAZolam 0.25 milliGRAM(s) Oral every 6 hours PRN anxiety  benzocaine 20% Spray 1 Spray(s) Topical every 4 hours PRN sore throat/nausea  ondansetron Injectable 4 milliGRAM(s) IV Push every 6 hours PRN Nausea and/or Vomiting  oxyCODONE    IR 5 milliGRAM(s) Oral every 4 hours PRN Moderate Pain (4 - 6)      Vital Signs Last 24 Hrs  T(C): 36.6 (03 Nov 2024 06:21), Max: 36.8 (02 Nov 2024 09:30)  T(F): 97.8 (03 Nov 2024 06:21), Max: 98.3 (02 Nov 2024 19:45)  HR: 91 (03 Nov 2024 06:21) (88 - 92)  BP: 167/82 (03 Nov 2024 06:21) (115/68 - 168/80)  BP(mean): --  RR: 18 (03 Nov 2024 06:21) (17 - 18)  SpO2: 97% (03 Nov 2024 06:21) (94% - 97%)    Parameters below as of 03 Nov 2024 06:21  Patient On (Oxygen Delivery Method): nasal cannula  O2 Flow (L/min): 3      PHYSICAL EXAM:  Constitutional: AAOx3, no acute distress  HEENT: NCAT, airway patent  Cardiovascular: RRR, pulses present bilaterally  Respiratory: nonlabored breathing  Gastrointestinal: abdomen soft, nontender, non distended, no rebound or guarding, no palpable masses, incisions are clean, dry and intact without any surrounding erythema, drainage, bleeding or signs of infection, stoma p/p/p  Neuro: no focal deficits  Extremities: non edematous, no calf pain bilaterally     I&O's Detail    02 Nov 2024 08:01  -  03 Nov 2024 07:00  --------------------------------------------------------  IN:  Total IN: 0 mL    OUT:    Voided (mL): 600 mL  Total OUT: 600 mL    Total NET: -600 mL          LABS:  [pending]

## 2024-11-04 LAB
ANION GAP SERPL CALC-SCNC: 5 MMOL/L — SIGNIFICANT CHANGE UP (ref 5–17)
BASOPHILS # BLD AUTO: 0.13 K/UL — SIGNIFICANT CHANGE UP (ref 0–0.2)
BASOPHILS NFR BLD AUTO: 0.8 % — SIGNIFICANT CHANGE UP (ref 0–2)
BUN SERPL-MCNC: 25 MG/DL — HIGH (ref 7–23)
CALCIUM SERPL-MCNC: 9.7 MG/DL — SIGNIFICANT CHANGE UP (ref 8.5–10.1)
CHLORIDE SERPL-SCNC: 107 MMOL/L — SIGNIFICANT CHANGE UP (ref 96–108)
CO2 SERPL-SCNC: 27 MMOL/L — SIGNIFICANT CHANGE UP (ref 22–31)
CREAT SERPL-MCNC: 1.2 MG/DL — SIGNIFICANT CHANGE UP (ref 0.5–1.3)
CULTURE RESULTS: SIGNIFICANT CHANGE UP
EGFR: 46 ML/MIN/1.73M2 — LOW
EOSINOPHIL # BLD AUTO: 0.43 K/UL — SIGNIFICANT CHANGE UP (ref 0–0.5)
EOSINOPHIL NFR BLD AUTO: 2.7 % — SIGNIFICANT CHANGE UP (ref 0–6)
GLUCOSE SERPL-MCNC: 136 MG/DL — HIGH (ref 70–99)
HCT VFR BLD CALC: 31.6 % — LOW (ref 34.5–45)
HGB BLD-MCNC: 10.2 G/DL — LOW (ref 11.5–15.5)
IMM GRANULOCYTES NFR BLD AUTO: 5.3 % — HIGH (ref 0–0.9)
LYMPHOCYTES # BLD AUTO: 24.8 % — SIGNIFICANT CHANGE UP (ref 13–44)
LYMPHOCYTES # BLD AUTO: 3.94 K/UL — HIGH (ref 1–3.3)
MAGNESIUM SERPL-MCNC: 2.2 MG/DL — SIGNIFICANT CHANGE UP (ref 1.6–2.6)
MCHC RBC-ENTMCNC: 29 PG — SIGNIFICANT CHANGE UP (ref 27–34)
MCHC RBC-ENTMCNC: 32.3 G/DL — SIGNIFICANT CHANGE UP (ref 32–36)
MCV RBC AUTO: 89.8 FL — SIGNIFICANT CHANGE UP (ref 80–100)
MONOCYTES # BLD AUTO: 1.06 K/UL — HIGH (ref 0–0.9)
MONOCYTES NFR BLD AUTO: 6.7 % — SIGNIFICANT CHANGE UP (ref 2–14)
NEUTROPHILS # BLD AUTO: 9.5 K/UL — HIGH (ref 1.8–7.4)
NEUTROPHILS NFR BLD AUTO: 59.7 % — SIGNIFICANT CHANGE UP (ref 43–77)
NRBC # BLD: 0 /100 WBCS — SIGNIFICANT CHANGE UP (ref 0–0)
PHOSPHATE SERPL-MCNC: 2.6 MG/DL — SIGNIFICANT CHANGE UP (ref 2.5–4.5)
PLATELET # BLD AUTO: 482 K/UL — HIGH (ref 150–400)
POTASSIUM SERPL-MCNC: 3.6 MMOL/L — SIGNIFICANT CHANGE UP (ref 3.5–5.3)
POTASSIUM SERPL-SCNC: 3.6 MMOL/L — SIGNIFICANT CHANGE UP (ref 3.5–5.3)
RBC # BLD: 3.52 M/UL — LOW (ref 3.8–5.2)
RBC # FLD: 14.7 % — HIGH (ref 10.3–14.5)
SODIUM SERPL-SCNC: 139 MMOL/L — SIGNIFICANT CHANGE UP (ref 135–145)
SPECIMEN SOURCE: SIGNIFICANT CHANGE UP
WBC # BLD: 15.91 K/UL — HIGH (ref 3.8–10.5)
WBC # FLD AUTO: 15.91 K/UL — HIGH (ref 3.8–10.5)

## 2024-11-04 PROCEDURE — 74177 CT ABD & PELVIS W/CONTRAST: CPT | Mod: 26

## 2024-11-04 PROCEDURE — 99232 SBSQ HOSP IP/OBS MODERATE 35: CPT

## 2024-11-04 PROCEDURE — 99233 SBSQ HOSP IP/OBS HIGH 50: CPT

## 2024-11-04 RX ORDER — ETHACRYNIC ACID 25 MG/1
25 TABLET ORAL ONCE
Refills: 0 | Status: DISCONTINUED | OUTPATIENT
Start: 2024-11-04 | End: 2024-11-04

## 2024-11-04 RX ORDER — FUROSEMIDE 40 MG
20 TABLET ORAL ONCE
Refills: 0 | Status: COMPLETED | OUTPATIENT
Start: 2024-11-04 | End: 2024-11-04

## 2024-11-04 RX ADMIN — VALSARTAN 320 MILLIGRAM(S): 160 TABLET ORAL at 06:25

## 2024-11-04 RX ADMIN — PIPERACILLIN AND TAZOBACTAM 25 GRAM(S): .5; 4 INJECTION, POWDER, LYOPHILIZED, FOR SOLUTION INTRAVENOUS at 13:54

## 2024-11-04 RX ADMIN — Medication 40 MILLIGRAM(S): at 09:31

## 2024-11-04 RX ADMIN — Medication 1000 MILLIGRAM(S): at 13:53

## 2024-11-04 RX ADMIN — Medication 1000 MILLIGRAM(S): at 14:31

## 2024-11-04 RX ADMIN — PIPERACILLIN AND TAZOBACTAM 25 GRAM(S): .5; 4 INJECTION, POWDER, LYOPHILIZED, FOR SOLUTION INTRAVENOUS at 06:25

## 2024-11-04 RX ADMIN — Medication 100 MILLIGRAM(S): at 17:50

## 2024-11-04 RX ADMIN — PANTOPRAZOLE SODIUM 40 MILLIGRAM(S): 40 TABLET, DELAYED RELEASE ORAL at 06:25

## 2024-11-04 RX ADMIN — Medication 10 MILLIGRAM(S): at 21:55

## 2024-11-04 RX ADMIN — Medication 1000 MILLIGRAM(S): at 06:30

## 2024-11-04 RX ADMIN — Medication 100 MILLIGRAM(S): at 06:24

## 2024-11-04 RX ADMIN — Medication 1000 MILLIGRAM(S): at 18:56

## 2024-11-04 RX ADMIN — Medication 1000 MILLIGRAM(S): at 00:01

## 2024-11-04 RX ADMIN — FENOFIBRATE 145 MILLIGRAM(S): 145 TABLET, FILM COATED ORAL at 11:16

## 2024-11-04 RX ADMIN — Medication 1000 MILLIGRAM(S): at 18:04

## 2024-11-04 RX ADMIN — PIPERACILLIN AND TAZOBACTAM 25 GRAM(S): .5; 4 INJECTION, POWDER, LYOPHILIZED, FOR SOLUTION INTRAVENOUS at 21:55

## 2024-11-04 RX ADMIN — Medication 20 MILLIGRAM(S): at 11:17

## 2024-11-04 NOTE — PROGRESS NOTE ADULT - SUBJECTIVE AND OBJECTIVE BOX
[facility]  INFECTIOUS DISEASES   [address] [place] [address]  Tel: [phone]     Fax: [phone]  ========================================================  MD Leodan Moura Michelle, MD Shah, Kaushal, MD Sunjit, Jaspal, MD Sehrish Shahid, MD   ========================================================    [id number]  VISHAL FULTON     Follow up: diverticulitis w/ perforation and abscess     Sitting on bed, looks NAD, no abdominal pain, was on regular diet.   No fever.     PAST MEDICAL & SURGICAL HISTORY:  Hypertension  Hyperlipidemia  Asthma, well controlled  Prediabetes  No significant past surgical history    Social Hx: No current smoking, EtOH or drugs     FAMILY HISTORY:  No pertinent family history in first degree relatives    Allergies  sulfa drugs (Unknown)    MEDICATIONS  (STANDING):  dextrose 5% + sodium chloride 0.9%. 1000 milliLiter(s) (125 mL/Hr) IV Continuous <Continuous>  dextrose 5%. 1000 milliLiter(s) (100 mL/Hr) IV Continuous <Continuous>  dextrose 5%. 1000 milliLiter(s) (50 mL/Hr) IV Continuous <Continuous>  dextrose 50% Injectable 12.5 Gram(s) IV Push once  dextrose 50% Injectable 25 Gram(s) IV Push once  dextrose 50% Injectable 25 Gram(s) IV Push once  glucagon  Injectable 1 milliGRAM(s) IntraMuscular once  heparin   Injectable 5000 Unit(s) SubCutaneous every 8 hours  influenza  Vaccine (HIGH DOSE) 0.5 milliLiter(s) IntraMuscular once  insulin lispro (ADMELOG) corrective regimen sliding scale   SubCutaneous every 6 hours  metoprolol tartrate Injectable 5 milliGRAM(s) IV Push every 6 hours  piperacillin/tazobactam IVPB.. 3.375 Gram(s) IV Intermittent every 8 hours    MEDICATIONS  (PRN):  albuterol    90 MICROgram(s) HFA Inhaler 1 Puff(s) Inhalation every 8 hours PRN Shortness of Breath and/or Wheezing  dextrose Oral Gel 15 Gram(s) Oral once PRN Blood Glucose LESS THAN 70 milliGRAM(s)/deciliter  ondansetron Injectable 4 milliGRAM(s) IV Push every 8 hours PRN Nausea and/or Vomiting     REVIEW OF SYSTEMS:  CONSTITUTIONAL:  No Fever or chills  HEENT:  No diplopia or blurred vision.  No sore throat or runny nose.  CARDIOVASCULAR:  No chest pain   RESPIRATORY:  No cough, shortness of breath, PND or orthopnea.  GASTROINTESTINAL:  No nausea, vomiting or diarrhea. + abd pain  GENITOURINARY:  No dysuria, frequency or urgency. No Blood in urine  MUSCULOSKELETAL:  no joint aches, no muscle pain  SKIN:  No change in skin, hair or nails.    Physical Exam:  Vital Signs Last 24 Hrs  T(C): 36.9 (04 Nov 2024 04:54), Max: 36.9 (03 Nov 2024 11:41)  T(F): 98.5 (04 Nov 2024 04:54), Max: 98.5 (04 Nov 2024 04:54)  HR: 88 (04 Nov 2024 04:54) (84 - 102)  BP: 162/79 (04 Nov 2024 04:54) (156/82 - 169/85)  BP(mean): --  RR: 18 (04 Nov 2024 04:54) (18 - 18)  SpO2: 93% (04 Nov 2024 04:54) (93% - 93%)  Parameters below as of 04 Nov 2024 04:54  Patient On (Oxygen Delivery Method): room air  GEN: NAD  HEENT: normocephalic and atraumatic.   NECK: Supple.  No lymphadenopathy   LUNGS: Clear to auscultation.  HEART: Regular rate and rhythm   ABDOMEN: Soft, +Lower abd tenderness,   Wound clean packed between sutures  nondistended. +Colostomy with some yellow fluid   : No CVA tenderness  EXTREMITIES: + edema.  NEUROLOGIC: grossly intact.  PSYCHIATRIC: Appropriate affect .  SKIN: No rash     Labs:                        10.2   15.91 )-----------( 482      ( 04 Nov 2024 07:05 )             31.6     11-04    139  |  107  |  25[H]  ----------------------------<  136[H]  3.6   |  27  |  1.20    Ca    9.7      04 Nov 2024 07:05  Phos  2.6     11-04  Mg     2.2     11-04    Culture - Body Fluid with Gram Stain (collected 10-30-24 @ 11:30)  Source: Peritoneal  Gram Stain (10-30-24 @ 23:42):    polymorphonuclear leukocytes seen    No organisms seen    by cytocentrifuge  Preliminary Report (10-31-24 @ 14:44):    No growth    Urinalysis with Rflx Culture (collected 10-29-24 @ 19:10)    Culture - Blood (collected 10-29-24 @ 18:20)  Source: .Blood BLOOD  Final Report (11-04-24 @ 01:01):    No growth at 5 days    Culture - Blood (collected 10-29-24 @ 18:15)  Source: .Blood BLOOD  Final Report (11-04-24 @ 01:00):    No growth at 5 days    WBC Count: 15.91 K/uL (11-04-24 @ 07:05)  WBC Count: 12.41 K/uL (11-03-24 @ 09:45)  WBC Count: 12.15 K/uL (11-02-24 @ 07:12)  WBC Count: 17.28 K/uL (11-01-24 @ 07:30)  WBC Count: 20.26 K/uL (10-31-24 @ 06:07)  WBC Count: 20.62 K/uL (10-30-24 @ 11:30)    Creatinine: 1.20 mg/dL (11-04-24 @ 07:05)  Creatinine: 1.20 mg/dL (11-03-24 @ 09:45)  Creatinine: 1.20 mg/dL (11-02-24 @ 07:12)  Creatinine: 1.50 mg/dL (11-01-24 @ 16:00)  Creatinine: 1.50 mg/dL (11-01-24 @ 07:30)  Creatinine: 1.50 mg/dL (10-31-24 @ 06:07)  Creatinine: 1.30 mg/dL (10-30-24 @ 11:30)    All imaging and other data have been reviewed.  < from: CT Abdomen and Pelvis w/ IV Cont (10.29.24 @ 20:21) >  IMPRESSION:  Acute sigmoid diverticulitis with associated right adnexal abscess and   perforation evidenced by small pneumoperitoneum.  After resolution of acute infection/inflammation, follow-up colonoscopy   can be considered to rule out underlying lesion.  Findings were discussed with PHOEBE MOLINA [id number] 10/29/2024   8:38 PM by Dr. Rodríguez with read back confirmation.  Indeterminant left adrenal 2.0 cm nodule. Consider adrenal CT in 12   months.    Assessment and Plan:   79 year old female with PMHx HTN, well-controlled asthma, HLD presenting to [place] ED with abdominal pain.    CT showed a perforated sigmoid colon with diverticulitis and collection. WBC 25k and Tmax 100.9.   s/p Campa procedure with colostomy on 10/30    # Perforated diverticulitis   # LAURI    - Blood cultures NGTD  - Peritoneal culture negative   - Monitor WBC today 25-->12-->15 went up  - Continue zosyn 3.375gm q8 to cover abdominal henrik  - Surgery follow up   - Repeat abdominal CT today to rule out any collection     Will follow.    Tremayne Schwartz MD  Division of Infectious Diseases   Please call ID service at [phone] with any question.    50 minutes spent on total encounter assessing patient, examination, chart review, counseling and coordinating care by the attending physician/nurse/care manager.

## 2024-11-04 NOTE — PROGRESS NOTE ADULT - ASSESSMENT
in progress    79 year old female with PMHx HTN, well-controlled asthma, HLD, prediabetes presenting to [place] ED with abdominal pain, admitted for acute sigmoid diverticulitis with abscess and perforation.  Admitted for diverticulitis w/ perforation and abscess.    - Pt a/w diverticulitis w/ perforation and abscess.  - S/p Exploratory laparotomy with Johnathan procedure, drainage of abdominal abscess   - Tolerated procedure well, cardiac status optimal post operatively   - s/p NGT, diet advanced per surgery    - EKG sinus tach with PVCs, possible L atrial enlargement, rate 106, Qtc 454  - CT Coronary 2023: Calcium score 340, no significant stenosis  - No evidence of any active ischemia   - continue Fenofibrate 160mg qd, Atorvastatin 10mg qhs when pt tolerates PO diet    - Previous TTE  (1/2024): LVEF 67%, LVH, mild left atrial dilation.  -S/P IV hydration for elevated creatinine  -w/ some dependent edema on exam   - Strict I/Os, daily weights.    - BP elevated overnight likely reactive multifactorial (missed med dosages d/t NPO, anxiety and discomfort)   -  home Valsartan 320mg qd, Metoprolol tartrate 100mg BID resumed, would increase HCTZ dose, BP remains elevated    - Monitor and replete lytes, keep K>4, Mg>2.  - Will continue to follow.    Consuelo Stevenson NP  Nurse Practitioner- Cardiology   Call TEAMS 79 year old female with PMHx HTN, well-controlled asthma, HLD, prediabetes presenting to [place] ED with abdominal pain, admitted for acute sigmoid diverticulitis with abscess and perforation.  Admitted for diverticulitis w/ perforation and abscess.    - Pt a/w diverticulitis w/ perforation and abscess.  - S/p Exploratory laparotomy with Johnathan procedure, drainage of abdominal abscess   - Tolerated procedure well, cardiac status optimal post operatively   - s/p NGT, diet advanced per surgery    - EKG sinus tach with PVCs, possible L atrial enlargement, rate 106, Qtc 454  - CT Coronary 2023: Calcium score 340, no significant stenosis  - No evidence of any active ischemia   - continue Fenofibrate 160mg qd, Atorvastatin 10mg qhs when pt tolerates PO diet    - Previous TTE  (1/2024): LVEF 67%, LVH, mild left atrial dilation.  -S/P IV hydration for elevated creatinine  -w/ some dependent edema on exam, cr stable, would give lasix 20mg ivp x1 today  - Strict I/Os, daily weights.    - BP elevated overnight likely reactive multifactorial (missed med dosages d/t NPO, anxiety and discomfort)   -  home Valsartan 320mg qd, Metoprolol tartrate 100mg BID resumed, would increase HCTZ dose    - Monitor and replete lytes, keep K>4, Mg>2.  - Will continue to follow.    Consuelo Stevenson NP  Nurse Practitioner- Cardiology   Call TEAMS 79 year old female with PMHx HTN, well-controlled asthma, HLD, prediabetes presenting to [place] ED with abdominal pain, admitted for acute sigmoid diverticulitis with abscess and perforation.  Admitted for diverticulitis w/ perforation and abscess.    - Pt a/w diverticulitis w/ perforation and abscess.  - S/p Exploratory laparotomy with Johnathan procedure, drainage of abdominal abscess   - Tolerated procedure well, cardiac status optimal post operatively   - s/p NGT, diet advanced per surgery    - EKG sinus tach with PVCs, possible L atrial enlargement, rate 106, Qtc 454  - CT Coronary 2023: Calcium score 340, no significant stenosis  - No evidence of any active ischemia   - continue Fenofibrate 160mg qd, Atorvastatin 10mg qhs when pt tolerates PO diet    - Previous TTE  (1/2024): LVEF 67%, LVH, mild left atrial dilation.  -S/P IV hydration for elevated creatinine  -w/ some dependent edema on exam, cr stable, would give lasix 20mg ivp x1 today, however since allergic to sulfa, can trial edecrin 25mg po x1 ( ordered)  - Strict I/Os, daily weights.    - BP elevated overnight likely reactive multifactorial (missed med dosages d/t NPO, anxiety and discomfort)   -  home Valsartan 320mg qd, Metoprolol tartrate 100mg BID resumed, would increase HCTZ dose    - Monitor and replete lytes, keep K>4, Mg>2.  - Will continue to follow.    Consuelo Stevenson NP  Nurse Practitioner- Cardiology   Call TEAMS 79 year old female with PMHx HTN, well-controlled asthma, HLD, prediabetes presenting to [place] ED with abdominal pain, admitted for acute sigmoid diverticulitis with abscess and perforation.  Admitted for diverticulitis w/ perforation and abscess.    - Pt a/w diverticulitis w/ perforation and abscess.  - S/p Exploratory laparotomy with Johnathan procedure, drainage of abdominal abscess   - Tolerated procedure well, cardiac status optimal post operatively   - s/p NGT, diet advanced per surgery    - EKG sinus tach with PVCs, possible L atrial enlargement, rate 106, Qtc 454  - CT Coronary 2023: Calcium score 340, no significant stenosis  - No evidence of any active ischemia   - continue Fenofibrate 160mg qd, Atorvastatin 10mg qhs when pt tolerates PO diet    - Previous TTE  (1/2024): LVEF 67%, LVH, mild left atrial dilation.  -S/P IV hydration for elevated creatinine  -w/ some dependent edema on exam, cr stable, would give lasix 20mg ivp x1 today, appears to be have been on lasix 20mg po at home  - Strict I/Os, daily weights.    - BP elevated overnight likely reactive multifactorial (missed med dosages d/t NPO, anxiety and discomfort)   -  home Valsartan 320mg qd, Metoprolol tartrate 100mg BID resumed, would increase HCTZ dose    - Monitor and replete lytes, keep K>4, Mg>2.  - Will continue to follow.    Consuelo Stevenson NP  Nurse Practitioner- Cardiology   Call TEAMS 79 year old female with PMHx HTN, well-controlled asthma, HLD, prediabetes presenting to [place] ED with abdominal pain, admitted for acute sigmoid diverticulitis with abscess and perforation.  Admitted for diverticulitis w/ perforation and abscess.    - Pt a/w diverticulitis w/ perforation and abscess.  - S/p Exploratory laparotomy with Johnathan procedure, drainage of abdominal abscess   - Tolerated procedure well, cardiac status optimal post operatively   - s/p NGT, diet advanced per surgery    - EKG sinus tach with PVCs, possible L atrial enlargement, rate 106, Qtc 454  - CT Coronary 2023: Calcium score 340, no significant stenosis  - No evidence of any active ischemia   - continue Fenofibrate 160mg qd, Atorvastatin 10mg qhs when pt tolerates PO diet    - Previous TTE  (1/2024): LVEF 67%, LVH, mild left atrial dilation.  -S/P IV hydration for elevated creatinine  -w/ some dependent edema on exam, cr stable, would give lasix 20mg ivp x1 today. pt a/ sulfa allergy, tolerating  hctz,   - Strict I/Os, daily weights.    - BP elevated overnight likely reactive multifactorial (missed med dosages d/t NPO, anxiety and discomfort)   -  home Valsartan 320mg qd, Metoprolol tartrate 100mg BID resumed, would increase HCTZ dose    - Monitor and replete lytes, keep K>4, Mg>2.  - Will continue to follow.    Consuelo Stevenson NP  Nurse Practitioner- Cardiology   Call TEAMS

## 2024-11-04 NOTE — PROGRESS NOTE ADULT - SUBJECTIVE AND OBJECTIVE BOX
SURGERY PA NOTE ON BEHALF OF DR. Morales/ Gen SURGERY:    S: Patient seen and examined at bedside.   Pt reports pain is well controlled; tolerating diet; voiding freely; ambulating independently. enies any CP, palps, SOB, N/V, fever or chills.     MEDICATIONS:  acetaminophen     Tablet .. 1000 milliGRAM(s) Oral every 6 hours  albuterol    90 MICROgram(s) HFA Inhaler 2 Puff(s) Inhalation every 6 hours PRN  ALPRAZolam 0.25 milliGRAM(s) Oral every 6 hours PRN  atorvastatin 10 milliGRAM(s) Oral at bedtime  benzocaine 20% Spray 1 Spray(s) Topical every 4 hours PRN  enoxaparin Injectable 40 milliGRAM(s) SubCutaneous every 24 hours  fenofibrate Tablet 145 milliGRAM(s) Oral daily  hydrochlorothiazide 25 milliGRAM(s) Oral daily  influenza  Vaccine (HIGH DOSE) 0.5 milliLiter(s) IntraMuscular once  metoprolol tartrate 100 milliGRAM(s) Oral two times a day  ondansetron Injectable 4 milliGRAM(s) IV Push every 6 hours PRN  oxyCODONE    IR 5 milliGRAM(s) Oral every 4 hours PRN  pantoprazole    Tablet 40 milliGRAM(s) Oral before breakfast  piperacillin/tazobactam IVPB.. 3.375 Gram(s) IV Intermittent every 8 hours  valsartan 320 milliGRAM(s) Oral daily      O:  Vital Signs Last 24 Hrs  T(C): 36.9 (04 Nov 2024 04:54), Max: 36.9 (03 Nov 2024 11:41)  T(F): 98.5 (04 Nov 2024 04:54), Max: 98.5 (04 Nov 2024 04:54)  HR: 88 (04 Nov 2024 04:54) (84 - 102)  BP: 162/79 (04 Nov 2024 04:54) (156/82 - 169/85)  BP(mean): --  RR: 18 (04 Nov 2024 04:54) (18 - 18)  SpO2: 93% (04 Nov 2024 04:54) (93% - 93%)    Parameters below as of 04 Nov 2024 04:54  Patient On (Oxygen Delivery Method): room air        I&O SUMMARY:    11-03-24 @ 07:01  -  11-04-24 @ 07:00  --------------------------------------------------------  IN: 0 mL / OUT: 800 mL / NET: -800 mL        PHYSICAL EXAM:  Lungs: CTA bilat    Card: S1S2  Abd: Soft, ND, appropriately tender. No rebound/guarding.  Ostomy with pink, viable stoma and + gas and stool in bag. Left lateral abd with area of erythema. Midline incision with serosang drainage-packed with iodoform gauze and DSD   Ext: Calves soft, NT, with tr edema bilat    LABS:                        9.1    12.41 )-----------( 361      ( 03 Nov 2024 09:45 )             28.3     11-03    141  |  109[H]  |  28[H]  ----------------------------<  148[H]  3.5   |  26  |  1.20    Ca    8.5      03 Nov 2024 09:45  Phos  2.4     11-03  Mg     2.1     11-03                RADIOLOGY:

## 2024-11-04 NOTE — CASE MANAGEMENT PROGRESS NOTE - NSCMPROGRESSNOTE_GEN_ALL_CORE
Discussed pt on rounds, pt remains acute, awaiting further testing, NPO, for urgent abd CT today, new colostomy, RN to reenforce colostomy education with family today, on IV Zosyn. CM will continue to collaborate with interdisciplinary team and remain available to assist.

## 2024-11-04 NOTE — PROGRESS NOTE ADULT - ASSESSMENT
79 year old female admitted with perforated sigmoid diverticulitis, now POD#5 s/p ex lap with Johnathan's    Plan:  - daily packing   - I&O's  - trending labs, WBC  - IS/OOBA/LVX  - Dispo planning with home care

## 2024-11-04 NOTE — PROGRESS NOTE ADULT - PROBLEM SELECTOR PLAN 1
- s/p surgery 10/30/2024. Post Ope Ileus. NGT removed. Tolerating full liquid diet   -Darío. S/p IVF. Renal function improved   - On Low fiber  diet, tolerating   - On  zosyn. ID following, plan to switch to oral antibiotics to complete the course per ID. Noted Leukocytosis   - Monitor for bowel function  -Monitor counts, electrolytes  -Hypophosphatemia: Replete as needed   -Patient is medically stable for discharge home vs [facility] - s/p surgery 10/30/2024. Post Ope Ileus. NGT removed. Tolerating full liquid diet   -Darío. S/p IVF. Renal function improved   - On Low fiber  diet, tolerating   - On  zosyn. ID following, plan to switch to oral antibiotics to complete the course per ID. Noted Leukocytosis, plan for CT abdomen  - Monitor for bowel function  -Monitor counts, electrolytes  -Hypophosphatemia: Replete as needed   -Patient is medically stable for discharge home vs [facility]

## 2024-11-04 NOTE — PROGRESS NOTE ADULT - SUBJECTIVE AND OBJECTIVE BOX
[facility] --  Gerry Wiseman Pannella, Patel, Savella, Goodger, Cohen  Office # [phone]    Follow Up:  Edema, HTN    Subjective/Observations: No events overnight resting comfortably in bed.  No complaints of chest pain, dyspnea, or palpitations reported. No signs of orthopnea or PND.     REVIEW OF SYSTEMS: All other review of systems is negative unless indicated above  PAST MEDICAL & SURGICAL HISTORY:  Hypertension      Hyperlipidemia      Asthma, well controlled      Prediabetes      No significant past surgical history        MEDICATIONS  (STANDING):  acetaminophen     Tablet .. 1000 milliGRAM(s) Oral every 6 hours  atorvastatin 10 milliGRAM(s) Oral at bedtime  enoxaparin Injectable 40 milliGRAM(s) SubCutaneous every 24 hours  fenofibrate Tablet 145 milliGRAM(s) Oral daily  hydrochlorothiazide 25 milliGRAM(s) Oral daily  influenza  Vaccine (HIGH DOSE) 0.5 milliLiter(s) IntraMuscular once  metoprolol tartrate 100 milliGRAM(s) Oral two times a day  pantoprazole    Tablet 40 milliGRAM(s) Oral before breakfast  piperacillin/tazobactam IVPB.. 3.375 Gram(s) IV Intermittent every 8 hours  valsartan 320 milliGRAM(s) Oral daily    MEDICATIONS  (PRN):  albuterol    90 MICROgram(s) HFA Inhaler 2 Puff(s) Inhalation every 6 hours PRN for bronchospasm  ALPRAZolam 0.25 milliGRAM(s) Oral every 6 hours PRN anxiety  benzocaine 20% Spray 1 Spray(s) Topical every 4 hours PRN sore throat/nausea  ondansetron Injectable 4 milliGRAM(s) IV Push every 6 hours PRN Nausea and/or Vomiting  oxyCODONE    IR 5 milliGRAM(s) Oral every 4 hours PRN Moderate Pain (4 - 6)    Allergies    sulfa drugs (Unknown)    Intolerances      Vital Signs Last 24 Hrs  T(C): 36.9 (04 Nov 2024 04:54), Max: 36.9 (03 Nov 2024 11:41)  T(F): 98.5 (04 Nov 2024 04:54), Max: 98.5 (04 Nov 2024 04:54)  HR: 88 (04 Nov 2024 04:54) (84 - 102)  BP: 162/79 (04 Nov 2024 04:54) (156/82 - 169/85)  BP(mean): --  RR: 18 (04 Nov 2024 04:54) (18 - 18)  SpO2: 93% (04 Nov 2024 04:54) (93% - 93%)    Parameters below as of 04 Nov 2024 04:54  Patient On (Oxygen Delivery Method): room air      I&O's Summary    03 Nov 2024 07:01  -  04 Nov 2024 07:00  --------------------------------------------------------  IN: 0 mL / OUT: 800 mL / NET: -800 mL        TELE: Off tele  PHYSICAL EXAM:  Constitutional: NAD, awake and alert  HEENT: Moist Mucous Membranes, Anicteric  Pulmonary: Non-labored, breath sounds are clear bilaterally, No wheezing, rales or rhonchi  Cardiovascular: Regular, S1 and S2, No murmurs, No rubs, gallops or clicks  Gastrointestinal:  soft, nontender, nondistended   Lymph: + peripheral edema. No lymphadenopathy.   Skin: No visible rashes or ulcers.  Psych:  Mood & affect appropriate      LABS: All Labs Reviewed:                        9.1    12.41 )-----------( 361      ( 03 Nov 2024 09:45 )             28.3                         8.9    12.15 )-----------( 339      ( 02 Nov 2024 07:12 )             27.7     03 Nov 2024 09:45    141    |  109    |  28     ----------------------------<  148    3.5     |  26     |  1.20   02 Nov 2024 07:12    140    |  109    |  26     ----------------------------<  113    3.5     |  24     |  1.20   01 Nov 2024 16:00    138    |  106    |  29     ----------------------------<  116    3.8     |  23     |  1.50     Ca    8.5        03 Nov 2024 09:45  Ca    8.1        02 Nov 2024 07:12  Ca    8.3        01 Nov 2024 16:00  Phos  2.4       03 Nov 2024 09:45  Phos  2.2       02 Nov 2024 07:12  Mg     2.1       03 Nov 2024 09:45  Mg     1.7       02 Nov 2024 07:12    TPro  6.3    /  Alb  2.2    /  TBili  0.8    /  DBili  x      /  AST  21     /  ALT  20     /  AlkPhos  74     02 Nov 2024 07:12          12 Lead ECG:   Ventricular Rate 85 BPM    Atrial Rate 85 BPM    P-R Interval 148 ms    QRS Duration 84 ms    Q-T Interval 376 ms    QTC Calculation(Bazett) 447 ms    P Axis 73 degrees    R Axis 9 degrees    T Axis 44 degrees    Diagnosis Line Normal sinus rhythm  T wave abnormality, consider lateral ischemia  Abnormal ECG    Confirmed by Milka Diaz ([id number]) on 10/31/2024 1:50:09 PM (10-31-24 @ 08:23)        [facility] --  Gerry Wiseman, Chas Chung Savella, Goodger, Cohen  Office # [phone]    Follow Up:  Edema, HTN    Subjective/Observations: No events overnight resting comfortably in bed.  No complaints of chest pain, dyspnea, or palpitations reported. No signs of orthopnea or PND.   + bl le edema noted    REVIEW OF SYSTEMS: All other review of systems is negative unless indicated above  PAST MEDICAL & SURGICAL HISTORY:  Hypertension      Hyperlipidemia      Asthma, well controlled      Prediabetes      No significant past surgical history        MEDICATIONS  (STANDING):  acetaminophen     Tablet .. 1000 milliGRAM(s) Oral every 6 hours  atorvastatin 10 milliGRAM(s) Oral at bedtime  enoxaparin Injectable 40 milliGRAM(s) SubCutaneous every 24 hours  fenofibrate Tablet 145 milliGRAM(s) Oral daily  hydrochlorothiazide 25 milliGRAM(s) Oral daily  influenza  Vaccine (HIGH DOSE) 0.5 milliLiter(s) IntraMuscular once  metoprolol tartrate 100 milliGRAM(s) Oral two times a day  pantoprazole    Tablet 40 milliGRAM(s) Oral before breakfast  piperacillin/tazobactam IVPB.. 3.375 Gram(s) IV Intermittent every 8 hours  valsartan 320 milliGRAM(s) Oral daily    MEDICATIONS  (PRN):  albuterol    90 MICROgram(s) HFA Inhaler 2 Puff(s) Inhalation every 6 hours PRN for bronchospasm  ALPRAZolam 0.25 milliGRAM(s) Oral every 6 hours PRN anxiety  benzocaine 20% Spray 1 Spray(s) Topical every 4 hours PRN sore throat/nausea  ondansetron Injectable 4 milliGRAM(s) IV Push every 6 hours PRN Nausea and/or Vomiting  oxyCODONE    IR 5 milliGRAM(s) Oral every 4 hours PRN Moderate Pain (4 - 6)    Allergies    sulfa drugs (Unknown)    Intolerances      Vital Signs Last 24 Hrs  T(C): 36.9 (04 Nov 2024 04:54), Max: 36.9 (03 Nov 2024 11:41)  T(F): 98.5 (04 Nov 2024 04:54), Max: 98.5 (04 Nov 2024 04:54)  HR: 88 (04 Nov 2024 04:54) (84 - 102)  BP: 162/79 (04 Nov 2024 04:54) (156/82 - 169/85)  BP(mean): --  RR: 18 (04 Nov 2024 04:54) (18 - 18)  SpO2: 93% (04 Nov 2024 04:54) (93% - 93%)    Parameters below as of 04 Nov 2024 04:54  Patient On (Oxygen Delivery Method): room air      I&O's Summary    03 Nov 2024 07:01  -  04 Nov 2024 07:00  --------------------------------------------------------  IN: 0 mL / OUT: 800 mL / NET: -800 mL        TELE: Off tele  PHYSICAL EXAM:  Constitutional: NAD, awake and alert  HEENT: Moist Mucous Membranes, Anicteric  Pulmonary: Non-labored, breath sounds are clear bilaterally, No wheezing, rales or rhonchi  Cardiovascular: Regular, S1 and S2, No murmurs, No rubs, gallops or clicks  Gastrointestinal:  soft, nontender, nondistended   Lymph: + peripheral edema. No lymphadenopathy.   Skin: No visible rashes or ulcers.  Psych:  Mood & affect appropriate      LABS: All Labs Reviewed:                        9.1    12.41 )-----------( 361      ( 03 Nov 2024 09:45 )             28.3                         8.9    12.15 )-----------( 339      ( 02 Nov 2024 07:12 )             27.7     03 Nov 2024 09:45    141    |  109    |  28     ----------------------------<  148    3.5     |  26     |  1.20   02 Nov 2024 07:12    140    |  109    |  26     ----------------------------<  113    3.5     |  24     |  1.20   01 Nov 2024 16:00    138    |  106    |  29     ----------------------------<  116    3.8     |  23     |  1.50     Ca    8.5        03 Nov 2024 09:45  Ca    8.1        02 Nov 2024 07:12  Ca    8.3        01 Nov 2024 16:00  Phos  2.4       03 Nov 2024 09:45  Phos  2.2       02 Nov 2024 07:12  Mg     2.1       03 Nov 2024 09:45  Mg     1.7       02 Nov 2024 07:12    TPro  6.3    /  Alb  2.2    /  TBili  0.8    /  DBili  x      /  AST  21     /  ALT  20     /  AlkPhos  74     02 Nov 2024 07:12          12 Lead ECG:   Ventricular Rate 85 BPM    Atrial Rate 85 BPM    P-R Interval 148 ms    QRS Duration 84 ms    Q-T Interval 376 ms    QTC Calculation(Bazett) 447 ms    P Axis 73 degrees    R Axis 9 degrees    T Axis 44 degrees    Diagnosis Line Normal sinus rhythm  T wave abnormality, consider lateral ischemia  Abnormal ECG    Confirmed by Milka Diaz ([id number]) on 10/31/2024 1:50:09 PM (10-31-24 @ 08:23)

## 2024-11-04 NOTE — PROGRESS NOTE ADULT - SUBJECTIVE AND OBJECTIVE BOX
Patient is a 79y old  Female who presents with a chief complaint of diverticulitis w/ perforation and abscess (04 Nov 2024 08:03)       INTERVAL HPI/OVERNIGHT EVENTS: Patient seen and examined at bedside. Denies any new symptoms, complaints. + gen weakness. No Chest pain, palpitations, sob, n/v    MEDICATIONS  (STANDING):  acetaminophen     Tablet .. 1000 milliGRAM(s) Oral every 6 hours  atorvastatin 10 milliGRAM(s) Oral at bedtime  enoxaparin Injectable 40 milliGRAM(s) SubCutaneous every 24 hours  fenofibrate Tablet 145 milliGRAM(s) Oral daily  hydrochlorothiazide 25 milliGRAM(s) Oral daily  influenza  Vaccine (HIGH DOSE) 0.5 milliLiter(s) IntraMuscular once  metoprolol tartrate 100 milliGRAM(s) Oral two times a day  pantoprazole    Tablet 40 milliGRAM(s) Oral before breakfast  piperacillin/tazobactam IVPB.. 3.375 Gram(s) IV Intermittent every 8 hours  valsartan 320 milliGRAM(s) Oral daily    MEDICATIONS  (PRN):  albuterol    90 MICROgram(s) HFA Inhaler 2 Puff(s) Inhalation every 6 hours PRN for bronchospasm  ALPRAZolam 0.25 milliGRAM(s) Oral every 6 hours PRN anxiety  benzocaine 20% Spray 1 Spray(s) Topical every 4 hours PRN sore throat/nausea  ondansetron Injectable 4 milliGRAM(s) IV Push every 6 hours PRN Nausea and/or Vomiting  oxyCODONE    IR 5 milliGRAM(s) Oral every 4 hours PRN Moderate Pain (4 - 6)      Allergies    sulfa drugs (Unknown)    Intolerances        REVIEW OF SYSTEMS:  Per HPI. All other ROS noted Negative   Vital Signs Last 24 Hrs  T(C): 36.9 (04 Nov 2024 04:54), Max: 36.9 (03 Nov 2024 11:41)  T(F): 98.5 (04 Nov 2024 04:54), Max: 98.5 (04 Nov 2024 04:54)  HR: 88 (04 Nov 2024 04:54) (84 - 102)  BP: 162/79 (04 Nov 2024 04:54) (156/82 - 169/85)  BP(mean): --  RR: 18 (04 Nov 2024 04:54) (18 - 18)  SpO2: 93% (04 Nov 2024 04:54) (93% - 93%)    Parameters below as of 04 Nov 2024 04:54  Patient On (Oxygen Delivery Method): room air        PHYSICAL EXAM:  GENERAL: NAD, comfortable   HEAD:  Atraumatic, Normocephalic  EYES: EOMI, PERRLA, conjunctiva and sclera clear  ENMT: Moist Mucus Membranes   NECK: Supple, No JVD, Normal thyroid  NERVOUS SYSTEM:  Alert & Oriented X3, Good concentration; Motor Strength 5/5 B/L upper and lower extremities  CHEST/LUNG: Clear to auscultation bilaterally; No rales, rhonchi, wheezing, or rubs  HEART: Regular rate and rhythm; No murmurs, rubs, or gallops  ABDOMEN: Soft, + colostomy   EXTREMITIES:  2+ Peripheral Pulses, No clubbing, cyanosis  LYMPH: No lymphadenopathy noted  SKIN: No rashes or lesions    LABS:                        10.2   15.91 )-----------( 482      ( 04 Nov 2024 07:05 )             31.6     04 Nov 2024 07:05    139    |  107    |  25     ----------------------------<  136    3.6     |  27     |  1.20     Ca    9.7        04 Nov 2024 07:05  Phos  2.6       04 Nov 2024 07:05  Mg     2.2       04 Nov 2024 07:05        CAPILLARY BLOOD GLUCOSE        BLOOD CULTURE    RADIOLOGY & ADDITIONAL TESTS:    Imaging Personally Reviewed:  [ ] YES     Consultant(s) Notes Reviewed:      Care Discussed with Consultants/Other Providers:

## 2024-11-05 ENCOUNTER — TRANSCRIPTION ENCOUNTER (OUTPATIENT)
Age: 79
End: 2024-11-05

## 2024-11-05 VITALS
DIASTOLIC BLOOD PRESSURE: 88 MMHG | RESPIRATION RATE: 20 BRPM | HEART RATE: 96 BPM | SYSTOLIC BLOOD PRESSURE: 156 MMHG | TEMPERATURE: 99 F | OXYGEN SATURATION: 91 %

## 2024-11-05 LAB
ALBUMIN SERPL ELPH-MCNC: 2.4 G/DL — LOW (ref 3.3–5)
ALP SERPL-CCNC: 139 U/L — HIGH (ref 40–120)
ALT FLD-CCNC: 31 U/L — SIGNIFICANT CHANGE UP (ref 12–78)
ANION GAP SERPL CALC-SCNC: 5 MMOL/L — SIGNIFICANT CHANGE UP (ref 5–17)
AST SERPL-CCNC: 28 U/L — SIGNIFICANT CHANGE UP (ref 15–37)
BASOPHILS # BLD AUTO: 0.07 K/UL — SIGNIFICANT CHANGE UP (ref 0–0.2)
BASOPHILS NFR BLD AUTO: 0.5 % — SIGNIFICANT CHANGE UP (ref 0–2)
BILIRUB SERPL-MCNC: 0.6 MG/DL — SIGNIFICANT CHANGE UP (ref 0.2–1.2)
BUN SERPL-MCNC: 26 MG/DL — HIGH (ref 7–23)
CALCIUM SERPL-MCNC: 9.2 MG/DL — SIGNIFICANT CHANGE UP (ref 8.5–10.1)
CHLORIDE SERPL-SCNC: 106 MMOL/L — SIGNIFICANT CHANGE UP (ref 96–108)
CO2 SERPL-SCNC: 28 MMOL/L — SIGNIFICANT CHANGE UP (ref 22–31)
CREAT SERPL-MCNC: 1.1 MG/DL — SIGNIFICANT CHANGE UP (ref 0.5–1.3)
EGFR: 51 ML/MIN/1.73M2 — LOW
EOSINOPHIL # BLD AUTO: 0.47 K/UL — SIGNIFICANT CHANGE UP (ref 0–0.5)
EOSINOPHIL NFR BLD AUTO: 3.1 % — SIGNIFICANT CHANGE UP (ref 0–6)
GLUCOSE SERPL-MCNC: 131 MG/DL — HIGH (ref 70–99)
HCT VFR BLD CALC: 25.9 % — LOW (ref 34.5–45)
HGB BLD-MCNC: 8.4 G/DL — LOW (ref 11.5–15.5)
IMM GRANULOCYTES NFR BLD AUTO: 4.4 % — HIGH (ref 0–0.9)
LYMPHOCYTES # BLD AUTO: 24.2 % — SIGNIFICANT CHANGE UP (ref 13–44)
LYMPHOCYTES # BLD AUTO: 3.71 K/UL — HIGH (ref 1–3.3)
MCHC RBC-ENTMCNC: 28.8 PG — SIGNIFICANT CHANGE UP (ref 27–34)
MCHC RBC-ENTMCNC: 32.4 G/DL — SIGNIFICANT CHANGE UP (ref 32–36)
MCV RBC AUTO: 88.7 FL — SIGNIFICANT CHANGE UP (ref 80–100)
MONOCYTES # BLD AUTO: 1.19 K/UL — HIGH (ref 0–0.9)
MONOCYTES NFR BLD AUTO: 7.8 % — SIGNIFICANT CHANGE UP (ref 2–14)
NEUTROPHILS # BLD AUTO: 9.19 K/UL — HIGH (ref 1.8–7.4)
NEUTROPHILS NFR BLD AUTO: 60 % — SIGNIFICANT CHANGE UP (ref 43–77)
NRBC # BLD: 0 /100 WBCS — SIGNIFICANT CHANGE UP (ref 0–0)
PLATELET # BLD AUTO: 409 K/UL — HIGH (ref 150–400)
POTASSIUM SERPL-MCNC: 3.5 MMOL/L — SIGNIFICANT CHANGE UP (ref 3.5–5.3)
POTASSIUM SERPL-SCNC: 3.5 MMOL/L — SIGNIFICANT CHANGE UP (ref 3.5–5.3)
PROT SERPL-MCNC: 6.6 G/DL — SIGNIFICANT CHANGE UP (ref 6–8.3)
RBC # BLD: 2.92 M/UL — LOW (ref 3.8–5.2)
RBC # FLD: 14.4 % — SIGNIFICANT CHANGE UP (ref 10.3–14.5)
SODIUM SERPL-SCNC: 139 MMOL/L — SIGNIFICANT CHANGE UP (ref 135–145)
WBC # BLD: 15.3 K/UL — HIGH (ref 3.8–10.5)
WBC # FLD AUTO: 15.3 K/UL — HIGH (ref 3.8–10.5)

## 2024-11-05 PROCEDURE — 99232 SBSQ HOSP IP/OBS MODERATE 35: CPT

## 2024-11-05 RX ORDER — ACETAMINOPHEN 500 MG
2 TABLET ORAL
Qty: 0 | Refills: 0 | DISCHARGE
Start: 2024-11-05

## 2024-11-05 RX ORDER — METRONIDAZOLE 250 MG/1
1 TABLET ORAL
Qty: 16 | Refills: 0
Start: 2024-11-05 | End: 2024-11-12

## 2024-11-05 RX ORDER — FUROSEMIDE 40 MG
40 TABLET ORAL ONCE
Refills: 0 | Status: COMPLETED | OUTPATIENT
Start: 2024-11-05 | End: 2024-11-05

## 2024-11-05 RX ORDER — METOPROLOL TARTRATE 50 MG
1 TABLET ORAL
Refills: 0 | DISCHARGE

## 2024-11-05 RX ORDER — METOPROLOL TARTRATE 50 MG
1 TABLET ORAL
Qty: 0 | Refills: 0 | DISCHARGE
Start: 2024-11-05

## 2024-11-05 RX ORDER — POTASSIUM CHLORIDE 10 MEQ
40 TABLET, EXTENDED RELEASE ORAL EVERY 4 HOURS
Refills: 0 | Status: DISCONTINUED | OUTPATIENT
Start: 2024-11-05 | End: 2024-11-05

## 2024-11-05 RX ORDER — ALBUTEROL 90 MCG
2 AEROSOL (GRAM) INHALATION
Qty: 0 | Refills: 0 | DISCHARGE

## 2024-11-05 RX ORDER — CEFPODOXIME PROXETIL 200 MG/1
1 TABLET, FILM COATED ORAL
Qty: 16 | Refills: 0
Start: 2024-11-05 | End: 2024-11-12

## 2024-11-05 RX ADMIN — Medication 40 MILLIGRAM(S): at 11:46

## 2024-11-05 RX ADMIN — Medication 100 MILLIGRAM(S): at 05:11

## 2024-11-05 RX ADMIN — VALSARTAN 320 MILLIGRAM(S): 160 TABLET ORAL at 05:13

## 2024-11-05 RX ADMIN — Medication 1000 MILLIGRAM(S): at 05:13

## 2024-11-05 RX ADMIN — Medication 1000 MILLIGRAM(S): at 00:06

## 2024-11-05 RX ADMIN — FENOFIBRATE 145 MILLIGRAM(S): 145 TABLET, FILM COATED ORAL at 11:46

## 2024-11-05 RX ADMIN — PANTOPRAZOLE SODIUM 40 MILLIGRAM(S): 40 TABLET, DELAYED RELEASE ORAL at 05:10

## 2024-11-05 RX ADMIN — PIPERACILLIN AND TAZOBACTAM 25 GRAM(S): .5; 4 INJECTION, POWDER, LYOPHILIZED, FOR SOLUTION INTRAVENOUS at 05:10

## 2024-11-05 RX ADMIN — Medication 40 MILLIGRAM(S): at 09:24

## 2024-11-05 NOTE — CASE MANAGEMENT PROGRESS NOTE - NSCMPROGRESSNOTE_GEN_ALL_CORE
Per MD pt is for possible discharge today 11/5/24. CM met with patient to discuss transition of care. Pt is to be transitioned to home with [facility] [phone]. CM explained home care services, expectations, process, insurance provisions and home safety with pt verbalizing understanding.  Pt states her daughter will assist post discharge. Pt stated her secure start colostomy supplies have been delivered to her home. CM confirmed with home care agency, pt case is being accepted and home care was made aware of DC plan today. Pt aware of plan and in agreement / verbalizes understanding. IMM discussed / given. Pt verbalized understanding. Confirmed pharmacy is [facility]. community MD is Dr. Eduardo. Pt stated they would make their own follow up appointments. Pt has new rolling walker from [facility] [phone] delivered by this CM. Pt stated her daughter will transport pt home. All questions answered. CM discussed plan with MD and RN. CM to remain available through hospitalization.

## 2024-11-05 NOTE — PROGRESS NOTE ADULT - PROBLEM SELECTOR PLAN 4
- A1C 6.5   - ISS
- A1C in AM  - ISS
- A1C 6.5   - ISS

## 2024-11-05 NOTE — PROGRESS NOTE ADULT - PROBLEM SELECTOR PLAN 1
- F/u WBC today; if downtrending possible D/C today  - Encouraged IS use/OOB/Ambulation  - Zosyn  - cont reg diet  - to discuss w/ Dr. Morales - F/u WBC today; if downtrending possible D/C today  - Encouraged IS use/OOB/Ambulation  - Zosyn  - cont reg diet  -Daily midline abd dressing with 1/4 inch iodoform and abd dry dressing  - to discuss w/ Dr. Morales

## 2024-11-05 NOTE — PROGRESS NOTE ADULT - ASSESSMENT
in progress  79 year old female with PMHx HTN, well-controlled asthma, HLD, prediabetes presenting to [place] ED with abdominal pain, admitted for acute sigmoid diverticulitis with abscess and perforation.  Admitted for diverticulitis w/ perforation and abscess.    - Pt a/w diverticulitis w/ perforation and abscess.  - S/p Exploratory laparotomy with Johnathan procedure, drainage of abdominal abscess   - Tolerated procedure well, cardiac status optimal post operatively   - s/p NGT, diet advanced per surgery    - EKG sinus tach with PVCs, possible L atrial enlargement, rate 106, Qtc 454  - CT Coronary 2023: Calcium score 340, no significant stenosis  - No evidence of any active ischemia   - continue Fenofibrate 160mg qd, Atorvastatin 10mg qhs when pt tolerates PO diet    - Previous TTE  (1/2024): LVEF 67%, LVH, mild left atrial dilation.  -S/P IV hydration for elevated creatinine  -w/ some dependent edema on exam, s/p lasix 20mg ivp 11/4 ( pt w/ sulfa allergy, tolerating  hctz). edema appears to be improving, Cr pending  - Strict I/Os, daily weights.    - BP elevated ( SBP 160s) likely reactive multifactorial (missed med dosages d/t NPO, anxiety and discomfort)   -  home Valsartan 320mg qd, Metoprolol tartrate 100mg BID resumed, would increase HCTZ dose ( to 50mg qd)    - dc planning per primary team  - Monitor and replete lytes, keep K>4, Mg>2.  - Will continue to follow.    Consuelo Stevenson NP  Nurse Practitioner- Cardiology   Call TEAMS 79 year old female with PMHx HTN, well-controlled asthma, HLD, prediabetes presenting to [place] ED with abdominal pain, admitted for acute sigmoid diverticulitis with abscess and perforation.  Admitted for diverticulitis w/ perforation and abscess.    - Pt a/w diverticulitis w/ perforation and abscess.  - S/p Exploratory laparotomy with Johnathan procedure, drainage of abdominal abscess   - Tolerated procedure well, cardiac status optimal post operatively   - s/p NGT, diet advanced per surgery    - EKG sinus tach with PVCs, possible L atrial enlargement, rate 106, Qtc 454  - CT Coronary 2023: Calcium score 340, no significant stenosis  - No evidence of any active ischemia   - continue Fenofibrate 160mg qd, Atorvastatin 10mg qhs when pt tolerates PO diet    - Previous TTE  (1/2024): LVEF 67%, LVH, mild left atrial dilation.  -S/P IV hydration for elevated creatinine  -vol ol on exam, w/ bl le dependent edema s/p lasix 20mg ivp 11/4 ( pt w/ sulfa allergy, tolerating  hctz). edema appears to be improving, Cr stable.  - would give lasix 40mg ivp today and dc home with lasix 40mg po qd  - Strict I/Os, daily weights.    - BP elevated ( SBP 160s) likely reactive multifactorial (missed med dosages d/t NPO, anxiety and discomfort)   -  home Valsartan 320mg qd, Metoprolol tartrate 100mg BID resumed  - would increase HCTZ dose ( to 50mg qd)  - dc planning per primary team  - Monitor and replete lytes, keep K>4, Mg>2.  - Will continue to follow.    Consuelo Stevenson NP  Nurse Practitioner- Cardiology   Call TEAMS 79 year old female with PMHx HTN, well-controlled asthma, HLD, prediabetes presenting to [place] ED with abdominal pain, admitted for acute sigmoid diverticulitis with abscess and perforation.  Admitted for diverticulitis w/ perforation and abscess.    - Pt a/w diverticulitis w/ perforation and abscess.  - S/p Exploratory laparotomy with Johnathan procedure, drainage of abdominal abscess   - Tolerated procedure well, cardiac status optimal post operatively   - s/p NGT, diet advanced per surgery    - EKG sinus tach with PVCs, possible L atrial enlargement, rate 106, Qtc 454  - CT Coronary 2023: Calcium score 340, no significant stenosis  - No evidence of any active ischemia   - continue Fenofibrate 160mg qd, Atorvastatin 10mg qhs when pt tolerates PO diet    - Previous TTE  (1/2024): LVEF 67%, LVH, mild left atrial dilation.  -S/P IV hydration for elevated creatinine  -vol ol on exam, w/ bl le dependent edema s/p lasix 20mg ivp 11/4 ( pt w/ sulfa allergy, tolerating  hctz). edema appears to be improving, Cr stable.  - would give lasix 40mg ivp today and dc home with lasix 40mg po qd  - Strict I/Os, daily weights.    - BP elevated ( SBP 160s) likely reactive multifactorial (missed med dosages d/t NPO, anxiety and discomfort)   -  home Valsartan 320mg qd, Metoprolol tartrate 100mg BID resumed  - Continue HCTZ  - dc planning per primary team  - Monitor and replete lytes, keep K>4, Mg>2.  - Will continue to follow.    Consuelo Stevenson NP  Nurse Practitioner- Cardiology   Call TEAMS

## 2024-11-05 NOTE — PROGRESS NOTE ADULT - PROBLEM SELECTOR PROBLEM 1
Acute diverticulitis

## 2024-11-05 NOTE — DISCHARGE NOTE NURSING/CASE MANAGEMENT/SOCIAL WORK - PATIENT PORTAL LINK FT
You can access the FollowMyHealth Patient Portal offered by [facility] by registering at the following website: [email or web address]. By joining [facility]’s FollowMyHealth portal, you will also be able to view your health information using other applications (apps) compatible with our system.

## 2024-11-05 NOTE — PROGRESS NOTE ADULT - PROBLEM SELECTOR PLAN 6
DVT PPX: Subq Lovenox

## 2024-11-05 NOTE — DISCHARGE NOTE NURSING/CASE MANAGEMENT/SOCIAL WORK - NSDCFUADDAPPT_GEN_ALL_CORE_FT
Call office to schedule appointment.  It is advisable to follow up with your primary care provider within the next 1 week. You have stated you will make your own follow up appointments.

## 2024-11-05 NOTE — PROGRESS NOTE ADULT - NSPROGADDITIONALINFOA_GEN_ALL_CORE
agree with above unless contradicts below  c/o pain and nausea  cr bumped up,  soft, ND  s/p rhiannarmbrooks's  cont npo/ivf/abx  cont jacome and NGT now, for bump in cr.
agree with above unless contradicts below  doing well  +ostomy outpt  ambulating  cont current care  ostomy teaching
agree with above unless contradicts below  doing well  stop fluids  reg diet  ambulate
agree with above unless contradicts below  for ct today  L flank bruising  wbc increasing
agree with above unless contradicts below  looks good  CT good  wbc still up  wound clean  ID consult and hopefully D/c

## 2024-11-05 NOTE — PROGRESS NOTE ADULT - PROBLEM SELECTOR PLAN 2
- IV metoprolol 5mg Q6 while NPO  - Also added IV Hydralazine PRN SBP>155
On Metoprolol, Losartan with hold parameters   Takes HCTZ at home, resumed  Blood Pressure dropped too low with addition of hydralazine earlier, so discontinued it.   Check Manual BP
- IV metoprolol 5mg Q6 while NPO  - Continue home meds when able
On Metoprolol   Added Po hydralazine with Hold parameters due to persistently elevated BP
On Metoprolol, Losartan with hold parameters   Takes HCTZ at home, resumed
On Metoprolol, Losartan with hold parameters   Takes HCTZ at home, resumed  Blood Pressure dropped too low with addition of hydralazine earlier, so discontinued it.   Check Manual BP

## 2024-11-05 NOTE — DISCHARGE NOTE NURSING/CASE MANAGEMENT/SOCIAL WORK - FINANCIAL ASSISTANCE
[facility] provides services at a reduced cost to those who are determined to be eligible through [facility]’s financial assistance program. Information regarding [facility]’s financial assistance program can be found by going to [email or web address] or by calling [phone].

## 2024-11-05 NOTE — PROGRESS NOTE ADULT - PROBLEM SELECTOR PROBLEM 3
Asthma, well controlled

## 2024-11-05 NOTE — PROGRESS NOTE ADULT - SUBJECTIVE AND OBJECTIVE BOX
Patient is a 79y old  Female who presents with a chief complaint of diverticulitis w/ perforation and abscess (05 Nov 2024 08:30)       INTERVAL HPI/OVERNIGHT EVENTS: Patient seen and examined at bedside. Feeling better, wants to go home. Denies chest pain, palpitations, sob, nausea, vomiting. Tolerating diet well. On room air. No cough, fever, chills    MEDICATIONS  (STANDING):  acetaminophen     Tablet .. 1000 milliGRAM(s) Oral every 6 hours  atorvastatin 10 milliGRAM(s) Oral at bedtime  enoxaparin Injectable 40 milliGRAM(s) SubCutaneous every 24 hours  fenofibrate Tablet 145 milliGRAM(s) Oral daily  hydrochlorothiazide 25 milliGRAM(s) Oral daily  influenza  Vaccine (HIGH DOSE) 0.5 milliLiter(s) IntraMuscular once  metoprolol tartrate 100 milliGRAM(s) Oral two times a day  pantoprazole    Tablet 40 milliGRAM(s) Oral before breakfast  piperacillin/tazobactam IVPB.. 3.375 Gram(s) IV Intermittent every 8 hours  valsartan 320 milliGRAM(s) Oral daily    MEDICATIONS  (PRN):  albuterol    90 MICROgram(s) HFA Inhaler 2 Puff(s) Inhalation every 6 hours PRN for bronchospasm  ALPRAZolam 0.25 milliGRAM(s) Oral every 6 hours PRN anxiety  benzocaine 20% Spray 1 Spray(s) Topical every 4 hours PRN sore throat/nausea  ondansetron Injectable 4 milliGRAM(s) IV Push every 6 hours PRN Nausea and/or Vomiting  oxyCODONE    IR 5 milliGRAM(s) Oral every 4 hours PRN Moderate Pain (4 - 6)      Allergies    sulfa drugs (Unknown)    Intolerances        REVIEW OF SYSTEMS:  Per HPI. All other ROS noted Negative   Vital Signs Last 24 Hrs  T(C): 36.9 (05 Nov 2024 04:54), Max: 36.9 (04 Nov 2024 20:15)  T(F): 98.4 (05 Nov 2024 04:54), Max: 98.5 (04 Nov 2024 20:15)  HR: 92 (05 Nov 2024 04:54) (74 - 92)  BP: 17/77 (05 Nov 2024 04:54) (17/77 - 159/74)  BP(mean): --  RR: 18 (05 Nov 2024 04:54) (18 - 18)  SpO2: 94% (05 Nov 2024 04:54) (94% - 96%)    Parameters below as of 05 Nov 2024 04:54  Patient On (Oxygen Delivery Method): room air        PHYSICAL EXAM:  GENERAL: NAD, comfortable   HEAD:  Atraumatic, Normocephalic  EYES: EOMI, PERRLA, conjunctiva and sclera clear  ENMT: Moist Mucus Membranes   NECK: Supple, No JVD, Normal thyroid  NERVOUS SYSTEM:  Alert & Oriented X3, Good concentration; Motor Strength 5/5 B/L upper and lower extremities  CHEST/LUNG: Clear to auscultation bilaterally; No rales, rhonchi, wheezing, or rubs  HEART: Regular rate and rhythm; No murmurs, rubs, or gallops  ABDOMEN: Soft, + colostomy   EXTREMITIES:  2+ Peripheral Pulses, No clubbing, cyanosis  LYMPH: No lymphadenopathy noted  SKIN: No rashes or lesions    LABS:                        8.4    15.30 )-----------( 409      ( 05 Nov 2024 07:00 )             25.9     05 Nov 2024 07:00    139    |  106    |  26     ----------------------------<  131    3.5     |  28     |  1.10     Ca    9.2        05 Nov 2024 07:00    TPro  6.6    /  Alb  2.4    /  TBili  0.6    /  DBili  x      /  AST  28     /  ALT  31     /  AlkPhos  139    05 Nov 2024 07:00      CAPILLARY BLOOD GLUCOSE        BLOOD CULTURE    RADIOLOGY & ADDITIONAL TESTS:    Imaging Personally Reviewed:  [ ] YES     Consultant(s) Notes Reviewed:      Care Discussed with Consultants/Other Providers:

## 2024-11-05 NOTE — PROGRESS NOTE ADULT - PROBLEM SELECTOR PLAN 3
- Lakeshia MEDELLINN

## 2024-11-05 NOTE — PROGRESS NOTE ADULT - NS ATTEND AMEND GEN_ALL_CORE FT
79 year old female with PMHx HTN, well-controlled asthma, HLD, prediabetes presenting to [place] ED with abdominal pain, admitted for acute sigmoid diverticulitis with abscess and perforation.  Admitted for diverticulitis w/ perforation and abscess.    - Pt a/w diverticulitis w/ perforation and abscess.  - S/p Exploratory laparotomy with Johnathan procedure, drainage of abdominal abscess   - Tolerated procedure well, cardiac status optimal post operatively   - Resume home Fenofibrate 160mg qd, Atorvastatin 10mg qhs   - w/ some dependent edema on exam   - creatinine is stable.  give lasix 20 mg IV x1  -  home Valsartan 320mg qd, Metoprolol tartrate 100mg BID resumed  - resume HCTZ 25mg qd     .
79 year old female with PMHx HTN, well-controlled asthma, HLD, prediabetes presenting to [place] ED with abdominal pain, admitted for acute sigmoid diverticulitis with abscess and perforation.  Admitted for diverticulitis w/ perforation and abscess.    - S/p Exploratory laparotomy with Johnathan procedure, drainage of abdominal abscess   - Tolerated procedure well, cardiac status optimal post operatively   - Resume home Fenofibrate 160mg qd, Atorvastatin 10mg qhs when pt tolerates PO diet  - Agree w/ IV hydration   - Continue IV metoprolol 5mg Q6 while NPO  - Transition to home HCTZ 25mg qd, Valsartan 320mg qd, Metoprolol tartrate 100mg TID once able to tolerate diet  - Tele w/ SR 60-80s, PVC, no events, can d/c telemetry
79 year old female with PMHx HTN, well-controlled asthma, HLD, prediabetes presenting to [place] ED with abdominal pain, admitted for acute sigmoid diverticulitis with abscess and perforation    - Patient without symptoms of angina or heart failure at this time.  - EKG sinus tach with PVCs  - No meaningful evidence of volume overload.  - Previous TTE  (1/2024): LVEF 67%, LVH, mild left atrial dilation.    - on home HCTZ 25mg qd, Valsartan 320mg qd, Metoprolol tartrate 100mg TID, now on hold as she remains NPO. Resume when able.   - on 5mg lopressor q6h while npo  - S/p Ex lap with Johnathan on 10/30, tolerated well from a cardiac standpoint.   - Monitor and replete lytes, keep K>4, Mg>2.  - All other workup per primary team.
79 year old female with PMHx HTN, well-controlled asthma, HLD, prediabetes presenting to [place] ED with abdominal pain, admitted for acute sigmoid diverticulitis with abscess and perforation.  Admitted for diverticulitis w/ perforation and abscess.    - Pt a/w diverticulitis w/ perforation and abscess.  - S/p Exploratory laparotomy with Johnathan procedure, drainage of abdominal abscess   - Tolerated procedure well, cardiac status optimal post operatively   - Resume home Fenofibrate 160mg qd, Atorvastatin 10mg qhs when able  - BP elevated overnight likely reactive multifactorial (missed med dosages d/t NPO, anxiety and discomfort)   - would resume home HCTZ 25mg qd, Valsartan 320mg qd, Metoprolol tartrate 100mg BID
79 year old female with PMHx HTN, well-controlled asthma, HLD, prediabetes presenting to [place] ED with abdominal pain, admitted for acute sigmoid diverticulitis with abscess and perforation.  Admitted for diverticulitis w/ perforation and abscess.    - Pt a/w diverticulitis w/ perforation and abscess.  - S/p Exploratory laparotomy with Johnathan procedure, drainage of abdominal abscess   - Tolerated procedure well, cardiac status optimal post operatively   - Resume home Fenofibrate 160mg qd, Atorvastatin 10mg qhs   - w/ some dependent edema on exam   - repeat IV lasix today  - Would increase statin as able, should be on ASA as well given CAC >100 but will defer to OP  - home Valsartan 320mg qd, Metoprolol tartrate 100mg BID resumed, continue HCTZ
79 year old female with PMHx HTN, well-controlled asthma, HLD, prediabetes presenting to [place] ED with abdominal pain, admitted for acute sigmoid diverticulitis with abscess and perforation.  Admitted for diverticulitis w/ perforation and abscess.    - Pt a/w diverticulitis w/ perforation and abscess.  - S/p Exploratory laparotomy with Johnathan procedure, drainage of abdominal abscess   - Tolerated procedure well, cardiac status optimal post operatively   - Resume home Fenofibrate 160mg qd, Atorvastatin 10mg qhs   - w/ some dependent edema on exam   - creatinine is stable.  give lasix 20 mg IV x1  - home Valsartan 320mg qd, Metoprolol tartrate 100mg BID resumed  - resume HCTZ 25mg qd

## 2024-11-05 NOTE — PROGRESS NOTE ADULT - TIME BILLING
Note written by attending. F2F visit. Meds, labs, vitals, chart reviewed. Plane d/w patient
Note written by attending. F2F visit. Meds, labs, vitals, chart reviewed. Plane d/w patient, counseling and education provided
Note written by attending. F2F visit. Meds, labs, vitals, chart reviewed. Plane d/w patient, counseling and education provided
Note written by attending. F2F visit. Meds, labs, vitals, chart reviewed. Plane d/w patient
Note written by attending. F2F visit. Meds, labs, vitals, chart reviewed. Plane d/w patient, counseling and education provided
Note written by attending. F2F visit. Meds, labs, vitals, chart reviewed. Plane d/w patient, counseling and education provided

## 2024-11-05 NOTE — PROGRESS NOTE ADULT - SUBJECTIVE AND OBJECTIVE BOX
[facility] --  Gerry Wiseman, Chas Chung Savella, Goodger, Cohen  Office # [phone]    Follow Up:  Edema, HTN      Subjective/Observations: No events overnight resting comfortably in bed.  No complaints of chest pain, dyspnea, or palpitations reported. No signs of orthopnea or PND.   + bl le edema better today      REVIEW OF SYSTEMS: All other review of systems is negative unless indicated above  PAST MEDICAL & SURGICAL HISTORY:  Hypertension      Hyperlipidemia      Asthma, well controlled      Prediabetes      No significant past surgical history        MEDICATIONS  (STANDING):  acetaminophen     Tablet .. 1000 milliGRAM(s) Oral every 6 hours  atorvastatin 10 milliGRAM(s) Oral at bedtime  enoxaparin Injectable 40 milliGRAM(s) SubCutaneous every 24 hours  fenofibrate Tablet 145 milliGRAM(s) Oral daily  hydrochlorothiazide 25 milliGRAM(s) Oral daily  influenza  Vaccine (HIGH DOSE) 0.5 milliLiter(s) IntraMuscular once  metoprolol tartrate 100 milliGRAM(s) Oral two times a day  pantoprazole    Tablet 40 milliGRAM(s) Oral before breakfast  piperacillin/tazobactam IVPB.. 3.375 Gram(s) IV Intermittent every 8 hours  valsartan 320 milliGRAM(s) Oral daily    MEDICATIONS  (PRN):  albuterol    90 MICROgram(s) HFA Inhaler 2 Puff(s) Inhalation every 6 hours PRN for bronchospasm  ALPRAZolam 0.25 milliGRAM(s) Oral every 6 hours PRN anxiety  benzocaine 20% Spray 1 Spray(s) Topical every 4 hours PRN sore throat/nausea  ondansetron Injectable 4 milliGRAM(s) IV Push every 6 hours PRN Nausea and/or Vomiting  oxyCODONE    IR 5 milliGRAM(s) Oral every 4 hours PRN Moderate Pain (4 - 6)    Allergies    sulfa drugs (Unknown)    Intolerances      Vital Signs Last 24 Hrs  T(C): 36.9 (05 Nov 2024 04:54), Max: 36.9 (04 Nov 2024 20:15)  T(F): 98.4 (05 Nov 2024 04:54), Max: 98.5 (04 Nov 2024 20:15)  HR: 92 (05 Nov 2024 04:54) (74 - 92)  BP: 17/77 (05 Nov 2024 04:54) (17/77 - 160/80)  BP(mean): --  RR: 18 (05 Nov 2024 04:54) (18 - 20)  SpO2: 94% (05 Nov 2024 04:54) (94% - 97%)    Parameters below as of 05 Nov 2024 04:54  Patient On (Oxygen Delivery Method): room air      I&O's Summary    04 Nov 2024 07:01  -  05 Nov 2024 07:00  --------------------------------------------------------  IN: 0 mL / OUT: 30 mL / NET: -30 mL        TELE: Off tele  PHYSICAL EXAM:  Constitutional: NAD, awake and alert  HEENT: Moist Mucous Membranes, Anicteric  Pulmonary: Non-labored, breath sounds are clear bilaterally, No wheezing, rales or rhonchi  Cardiovascular: Regular, S1 and S2, No murmurs, No rubs, gallops or clicks  Gastrointestinal:  soft, nontender, nondistended   Lymph: + peripheral edema. No lymphadenopathy.   Skin: No visible rashes or ulcers.    LABS: All Labs Reviewed:                        8.4    15.30 )-----------( 409      ( 05 Nov 2024 07:00 )             25.9                         10.2   15.91 )-----------( 482      ( 04 Nov 2024 07:05 )             31.6                         9.1    12.41 )-----------( 361      ( 03 Nov 2024 09:45 )             28.3     04 Nov 2024 07:05    139    |  107    |  25     ----------------------------<  136    3.6     |  27     |  1.20   03 Nov 2024 09:45    141    |  109    |  28     ----------------------------<  148    3.5     |  26     |  1.20     Ca    9.7        04 Nov 2024 07:05  Ca    8.5        03 Nov 2024 09:45  Phos  2.6       04 Nov 2024 07:05  Phos  2.4       03 Nov 2024 09:45  Mg     2.2       04 Nov 2024 07:05  Mg     2.1       03 Nov 2024 09:45            12 Lead ECG:   Ventricular Rate 85 BPM    Atrial Rate 85 BPM    P-R Interval 148 ms    QRS Duration 84 ms    Q-T Interval 376 ms    QTC Calculation(Bazett) 447 ms    P Axis 73 degrees    R Axis 9 degrees    T Axis 44 degrees    Diagnosis Line Normal sinus rhythm  T wave abnormality, consider lateral ischemia  Abnormal ECG    Confirmed by Milka Diaz ([id number]) on 10/31/2024 1:50:09 PM (10-31-24 @ 08:23)        [facility] --  Gerry Wiseman, Chas Chung Savella, Goodger, Cohen  Office # [phone]    Follow Up:  Edema, HTN      Subjective/Observations: No events overnight resting comfortably in bed.  No complaints of chest pain, dyspnea, or palpitations reported. No signs of orthopnea or PND.   + bl le edema better today, but still vol ol      REVIEW OF SYSTEMS: All other review of systems is negative unless indicated above  PAST MEDICAL & SURGICAL HISTORY:  Hypertension      Hyperlipidemia      Asthma, well controlled      Prediabetes      No significant past surgical history        MEDICATIONS  (STANDING):  acetaminophen     Tablet .. 1000 milliGRAM(s) Oral every 6 hours  atorvastatin 10 milliGRAM(s) Oral at bedtime  enoxaparin Injectable 40 milliGRAM(s) SubCutaneous every 24 hours  fenofibrate Tablet 145 milliGRAM(s) Oral daily  hydrochlorothiazide 25 milliGRAM(s) Oral daily  influenza  Vaccine (HIGH DOSE) 0.5 milliLiter(s) IntraMuscular once  metoprolol tartrate 100 milliGRAM(s) Oral two times a day  pantoprazole    Tablet 40 milliGRAM(s) Oral before breakfast  piperacillin/tazobactam IVPB.. 3.375 Gram(s) IV Intermittent every 8 hours  valsartan 320 milliGRAM(s) Oral daily    MEDICATIONS  (PRN):  albuterol    90 MICROgram(s) HFA Inhaler 2 Puff(s) Inhalation every 6 hours PRN for bronchospasm  ALPRAZolam 0.25 milliGRAM(s) Oral every 6 hours PRN anxiety  benzocaine 20% Spray 1 Spray(s) Topical every 4 hours PRN sore throat/nausea  ondansetron Injectable 4 milliGRAM(s) IV Push every 6 hours PRN Nausea and/or Vomiting  oxyCODONE    IR 5 milliGRAM(s) Oral every 4 hours PRN Moderate Pain (4 - 6)    Allergies    sulfa drugs (Unknown)    Intolerances      Vital Signs Last 24 Hrs  T(C): 36.9 (05 Nov 2024 04:54), Max: 36.9 (04 Nov 2024 20:15)  T(F): 98.4 (05 Nov 2024 04:54), Max: 98.5 (04 Nov 2024 20:15)  HR: 92 (05 Nov 2024 04:54) (74 - 92)  BP: 17/77 (05 Nov 2024 04:54) (17/77 - 160/80)  BP(mean): --  RR: 18 (05 Nov 2024 04:54) (18 - 20)  SpO2: 94% (05 Nov 2024 04:54) (94% - 97%)    Parameters below as of 05 Nov 2024 04:54  Patient On (Oxygen Delivery Method): room air      I&O's Summary    04 Nov 2024 07:01  -  05 Nov 2024 07:00  --------------------------------------------------------  IN: 0 mL / OUT: 30 mL / NET: -30 mL        TELE: Off tele  PHYSICAL EXAM:  Constitutional: NAD, awake and alert  HEENT: Moist Mucous Membranes, Anicteric  Pulmonary: Non-labored, breath sounds are clear bilaterally, No wheezing, rales or rhonchi  Cardiovascular: Regular, S1 and S2, No murmurs, No rubs, gallops or clicks  Gastrointestinal:  soft, nontender, nondistended   Lymph: + peripheral edema. No lymphadenopathy.   Skin: No visible rashes or ulcers.    LABS: All Labs Reviewed:                        8.4    15.30 )-----------( 409      ( 05 Nov 2024 07:00 )             25.9                         10.2   15.91 )-----------( 482      ( 04 Nov 2024 07:05 )             31.6                         9.1    12.41 )-----------( 361      ( 03 Nov 2024 09:45 )             28.3     04 Nov 2024 07:05    139    |  107    |  25     ----------------------------<  136    3.6     |  27     |  1.20   03 Nov 2024 09:45    141    |  109    |  28     ----------------------------<  148    3.5     |  26     |  1.20     Ca    9.7        04 Nov 2024 07:05  Ca    8.5        03 Nov 2024 09:45  Phos  2.6       04 Nov 2024 07:05  Phos  2.4       03 Nov 2024 09:45  Mg     2.2       04 Nov 2024 07:05  Mg     2.1       03 Nov 2024 09:45            12 Lead ECG:   Ventricular Rate 85 BPM    Atrial Rate 85 BPM    P-R Interval 148 ms    QRS Duration 84 ms    Q-T Interval 376 ms    QTC Calculation(Bazett) 447 ms    P Axis 73 degrees    R Axis 9 degrees    T Axis 44 degrees    Diagnosis Line Normal sinus rhythm  T wave abnormality, consider lateral ischemia  Abnormal ECG    Confirmed by Milka Diaz ([id number]) on 10/31/2024 1:50:09 PM (10-31-24 @ 08:23)        [facility] --  Gerry Wiseman, Chas Chung Savella, Goodger, Cohen  Office # [phone]    Follow Up:  Edema, HTN      Subjective/Observations: No events overnight resting comfortably in bed.  No complaints of chest pain, dyspnea, or palpitations reported. No signs of orthopnea or PND.   + bl le edema better today, but still vol ol      REVIEW OF SYSTEMS: All other review of systems is negative unless indicated above  PAST MEDICAL & SURGICAL HISTORY:  Hypertension      Hyperlipidemia      Asthma, well controlled      Prediabetes      No significant past surgical history        MEDICATIONS  (STANDING):  acetaminophen     Tablet .. 1000 milliGRAM(s) Oral every 6 hours  atorvastatin 10 milliGRAM(s) Oral at bedtime  enoxaparin Injectable 40 milliGRAM(s) SubCutaneous every 24 hours  fenofibrate Tablet 145 milliGRAM(s) Oral daily  hydrochlorothiazide 25 milliGRAM(s) Oral daily  influenza  Vaccine (HIGH DOSE) 0.5 milliLiter(s) IntraMuscular once  metoprolol tartrate 100 milliGRAM(s) Oral two times a day  pantoprazole    Tablet 40 milliGRAM(s) Oral before breakfast  piperacillin/tazobactam IVPB.. 3.375 Gram(s) IV Intermittent every 8 hours  valsartan 320 milliGRAM(s) Oral daily    MEDICATIONS  (PRN):  albuterol    90 MICROgram(s) HFA Inhaler 2 Puff(s) Inhalation every 6 hours PRN for bronchospasm  ALPRAZolam 0.25 milliGRAM(s) Oral every 6 hours PRN anxiety  benzocaine 20% Spray 1 Spray(s) Topical every 4 hours PRN sore throat/nausea  ondansetron Injectable 4 milliGRAM(s) IV Push every 6 hours PRN Nausea and/or Vomiting  oxyCODONE    IR 5 milliGRAM(s) Oral every 4 hours PRN Moderate Pain (4 - 6)    Allergies    sulfa drugs (Unknown)    Intolerances      Vital Signs Last 24 Hrs  T(C): 36.9 (05 Nov 2024 04:54), Max: 36.9 (04 Nov 2024 20:15)  T(F): 98.4 (05 Nov 2024 04:54), Max: 98.5 (04 Nov 2024 20:15)  HR: 92 (05 Nov 2024 04:54) (74 - 92)  BP: 17/77 (05 Nov 2024 04:54) (17/77 - 160/80)  BP(mean): --  RR: 18 (05 Nov 2024 04:54) (18 - 20)  SpO2: 94% (05 Nov 2024 04:54) (94% - 97%)    Parameters below as of 05 Nov 2024 04:54  Patient On (Oxygen Delivery Method): room air      I&O's Summary    04 Nov 2024 07:01  -  05 Nov 2024 07:00  --------------------------------------------------------  IN: 0 mL / OUT: 30 mL / NET: -30 mL        TELE: Off tele  PHYSICAL EXAM:  Constitutional: NAD, awake and alert  HEENT: Moist Mucous Membranes, Anicteric  Pulmonary: Non-labored, breath sounds are clear bilaterally, No wheezing, rales or rhonchi  Cardiovascular: Regular, S1 and S2, No murmurs, No rubs, gallops or clicks  Gastrointestinal:  soft, nontender, nondistended   Lymph: + peripheral edema. No lymphadenopathy.   Skin: No visible rashes or ulcers.    LABS: All Labs Reviewed:                        8.4    15.30 )-----------( 409      ( 05 Nov 2024 07:00 )             25.9                         10.2   15.91 )-----------( 482      ( 04 Nov 2024 07:05 )             31.6                         9.1    12.41 )-----------( 361      ( 03 Nov 2024 09:45 )             28.3     04 Nov 2024 07:05    139    |  107    |  25     ----------------------------<  136    3.6     |  27     |  1.20   03 Nov 2024 09:45    141    |  109    |  28     ----------------------------<  148    3.5     |  26     |  1.20     Ca    9.7        04 Nov 2024 07:05  Ca    8.5        03 Nov 2024 09:45  Phos  2.6       04 Nov 2024 07:05  Phos  2.4       03 Nov 2024 09:45  Mg     2.2       04 Nov 2024 07:05  Mg     2.1       03 Nov 2024 09:45            12 Lead ECG:   Ventricular Rate 85 BPM    Atrial Rate 85 BPM    P-R Interval 148 ms    QRS Duration 84 ms    Q-T Interval 376 ms    QTC Calculation(Bazett) 447 ms    P Axis 73 degrees    R Axis 9 degrees    T Axis 44 degrees    Diagnosis Line Normal sinus rhythm  T wave abnormality, consider lateral ischemia  Abnormal ECG    Confirmed by Milka Diaz ([id number]) on 10/31/2024 1:50:09 PM (10-31-24 @ 08:23)

## 2024-11-05 NOTE — PROGRESS NOTE ADULT - PROBLEM SELECTOR PLAN 5
- Continue home atorvastatin and fenofibrate when able

## 2024-11-05 NOTE — PROGRESS NOTE ADULT - SUBJECTIVE AND OBJECTIVE BOX
[facility]  INFECTIOUS DISEASES   [address] [place] [address]  Tel: [phone]     Fax: [phone]  ========================================================  MD Leodan Moura Michelle, MD Shah, Kaushal, MD Sunjit, Jaspal, MD Sehrish Shahid, MD   ========================================================    [id number]  VISHAL FULTON     Follow up: diverticulitis w/ perforation and abscess     Sitting on bed, NAD, no abdominal pain, was on regular diet.   No fever.     PAST MEDICAL & SURGICAL HISTORY:  Hypertension  Hyperlipidemia  Asthma, well controlled  Prediabetes  No significant past surgical history    Social Hx: No current smoking, EtOH or drugs     FAMILY HISTORY:  No pertinent family history in first degree relatives    Allergies  sulfa drugs (Unknown)    MEDICATIONS  (STANDING):  dextrose 5% + sodium chloride 0.9%. 1000 milliLiter(s) (125 mL/Hr) IV Continuous <Continuous>  dextrose 5%. 1000 milliLiter(s) (100 mL/Hr) IV Continuous <Continuous>  dextrose 5%. 1000 milliLiter(s) (50 mL/Hr) IV Continuous <Continuous>  dextrose 50% Injectable 12.5 Gram(s) IV Push once  dextrose 50% Injectable 25 Gram(s) IV Push once  dextrose 50% Injectable 25 Gram(s) IV Push once  glucagon  Injectable 1 milliGRAM(s) IntraMuscular once  heparin   Injectable 5000 Unit(s) SubCutaneous every 8 hours  influenza  Vaccine (HIGH DOSE) 0.5 milliLiter(s) IntraMuscular once  insulin lispro (ADMELOG) corrective regimen sliding scale   SubCutaneous every 6 hours  metoprolol tartrate Injectable 5 milliGRAM(s) IV Push every 6 hours  piperacillin/tazobactam IVPB.. 3.375 Gram(s) IV Intermittent every 8 hours    MEDICATIONS  (PRN):  albuterol    90 MICROgram(s) HFA Inhaler 1 Puff(s) Inhalation every 8 hours PRN Shortness of Breath and/or Wheezing  dextrose Oral Gel 15 Gram(s) Oral once PRN Blood Glucose LESS THAN 70 milliGRAM(s)/deciliter  ondansetron Injectable 4 milliGRAM(s) IV Push every 8 hours PRN Nausea and/or Vomiting     REVIEW OF SYSTEMS:  CONSTITUTIONAL:  No Fever or chills  HEENT:  No diplopia or blurred vision.  No sore throat or runny nose.  CARDIOVASCULAR:  No chest pain   RESPIRATORY:  No cough, shortness of breath, PND or orthopnea.  GASTROINTESTINAL:  No nausea, vomiting or diarrhea. + abd pain  GENITOURINARY:  No dysuria, frequency or urgency. No Blood in urine  MUSCULOSKELETAL:  no joint aches, no muscle pain  SKIN:  No change in skin, hair or nails.    Physical Exam:  Vital Signs Last 24 Hrs  T(C): 36.9 (05 Nov 2024 04:54), Max: 36.9 (04 Nov 2024 20:15)  T(F): 98.4 (05 Nov 2024 04:54), Max: 98.5 (04 Nov 2024 20:15)  HR: 92 (05 Nov 2024 04:54) (74 - 92)  BP: 17/77 (05 Nov 2024 04:54) (17/77 - 159/74)  BP(mean): --  RR: 18 (05 Nov 2024 04:54) (18 - 18)  SpO2: 94% (05 Nov 2024 04:54) (94% - 96%)  Parameters below as of 05 Nov 2024 04:54  Patient On (Oxygen Delivery Method): room air  GEN: NAD  HEENT: normocephalic and atraumatic.   NECK: Supple.  No lymphadenopathy   LUNGS: Clear to auscultation.  HEART: Regular rate and rhythm   ABDOMEN: Soft, +Lower abd tenderness,   Wound clean packed between sutures  nondistended. +Colostomy with some yellow fluid   : No CVA tenderness  EXTREMITIES: + edema.  NEUROLOGIC: grossly intact.  PSYCHIATRIC: Appropriate affect .  SKIN: No rash     Labs:                        8.4    15.30 )-----------( 409      ( 05 Nov 2024 07:00 )             25.9     11-05    139  |  106  |  26[H]  ----------------------------<  131[H]  3.5   |  28  |  1.10    Ca    9.2      05 Nov 2024 07:00  Phos  2.6     11-04  Mg     2.2     11-04    TPro  6.6  /  Alb  2.4[L]  /  TBili  0.6  /  DBili  x   /  AST  28  /  ALT  31  /  AlkPhos  139[H]  11-05    Culture - Body Fluid with Gram Stain (collected 10-30-24 @ 11:30)  Source: Peritoneal  Gram Stain (10-30-24 @ 23:42):    polymorphonuclear leukocytes seen    No organisms seen    by cytocentrifuge  Final Report (11-04-24 @ 17:32):    No growth at 5 days    Urinalysis with Rflx Culture (collected 10-29-24 @ 19:10)    Culture - Blood (collected 10-29-24 @ 18:20)  Source: .Blood BLOOD  Final Report (11-04-24 @ 01:01):    No growth at 5 days    Culture - Blood (collected 10-29-24 @ 18:15)  Source: .Blood BLOOD  Final Report (11-04-24 @ 01:00):    No growth at 5 days    WBC Count: 15.30 K/uL (11-05-24 @ 07:00)  WBC Count: 15.91 K/uL (11-04-24 @ 07:05)  WBC Count: 12.41 K/uL (11-03-24 @ 09:45)  WBC Count: 12.15 K/uL (11-02-24 @ 07:12)  WBC Count: 17.28 K/uL (11-01-24 @ 07:30)    Creatinine: 1.10 mg/dL (11-05-24 @ 07:00)  Creatinine: 1.20 mg/dL (11-04-24 @ 07:05)  Creatinine: 1.20 mg/dL (11-03-24 @ 09:45)  Creatinine: 1.20 mg/dL (11-02-24 @ 07:12)  Creatinine: 1.50 mg/dL (11-01-24 @ 16:00)  Creatinine: 1.50 mg/dL (11-01-24 @ 07:30)    All imaging and other data have been reviewed.  < from: CT Abdomen and Pelvis w/ IV Cont (10.29.24 @ 20:21) >  IMPRESSION:  Acute sigmoid diverticulitis with associated right adnexal abscess and   perforation evidenced by small pneumoperitoneum.  After resolution of acute infection/inflammation, follow-up colonoscopy   can be considered to rule out underlying lesion.  Findings were discussed with PHOEBE MOLINA [id number] 10/29/2024   8:38 PM by Dr. Rodríguez with read back confirmation.  Indeterminant left adrenal 2.0 cm nodule. Consider adrenal CT in 12 months.    < from: CT Abdomen and Pelvis w/ IV Cont (11.04.24 @ 11:04) >  IMPRESSION:  Postsurgical changes of Johnathan's procedure with left lower quadrant   colostomy. Moderate surrounding subcutaneous fat stranding of the left   lower anterior abdominal wall and left flank, which could be soft tissue   infection, edema, or injury. No drainable fluid collection is seen.  Tiny loculated fluid collections in the pelvic cul-de-sac and right   adnexa, presumably postsurgical.  Small to moderate bilateral pleural effusions with bilateral lower lobe   consolidation/atelectasis.    Assessment and Plan:   79 year old female with PMHx HTN, well-controlled asthma, HLD presenting to [facility] with abdominal pain.    CT showed a perforated sigmoid colon with diverticulitis and collection. WBC 25k and Tmax 100.9.   s/p Campa procedure with colostomy on 10/30    # Perforated diverticulitis   # LAURI    - Blood cultures NGTD  - Peritoneal culture negative   - WBC today 25-->12-->15   - On zosyn 3.375gm q8 to cover abdominal henrik  - Surgery follow up noted   - Repeat abdominal CT result noted from yesterday  - Can switch to oral vantin 200mg q12 and flaglyl 500mg q12 to complete total 2 weeks from the surgery day  - Follow up with surgery after discharge, needs wound care for colostomy and abdominal wound dressing/packing    Will sign off please call with any question.     Tremayne Schwartz MD  Division of Infectious Diseases   Please call ID service at [phone] with any question.    50 minutes spent on total encounter assessing patient, examination, chart review, counseling and coordinating care by the attending physician/nurse/care manager.

## 2024-11-05 NOTE — PROGRESS NOTE ADULT - PROBLEM SELECTOR PLAN 1
- s/p surgery 10/30/2024. Post Ope Ileus. NGT removed. Tolerating full liquid diet   -Darío. S/p IVF. Renal function improved   - On Low fiber  diet, tolerating   - On  zosyn. ID following, plan to switch to oral antibiotics to complete the course per ID. Noted Leukocytosis, afebrile, no cough, chills, sob  -CT abd/pelvis from yesterday reviewed. Incentive spirometry for atelectasis   - Monitor for bowel function  -Monitor counts, electrolytes  -Hypophosphatemia: Replete as needed

## 2024-11-05 NOTE — PROGRESS NOTE ADULT - PROVIDER SPECIALTY LIST ADULT
Cardiology
Hospitalist
Hospitalist
Infectious Disease
Surgery
Infectious Disease
Cardiology
Infectious Disease
Infectious Disease
Surgery
Cardiology
Surgery
Hospitalist

## 2024-11-05 NOTE — PROGRESS NOTE ADULT - ASSESSMENT
79 year old female admitted with perforated sigmoid diverticulitis, now POD#6 s/p ex lap with Johnathan's

## 2024-11-05 NOTE — PROGRESS NOTE ADULT - SUBJECTIVE AND OBJECTIVE BOX
SURGERY PA NOTE ON BEHALF OF DR. ANDERSON:     Patient seen and examined at bedside.  Patient has no new complaints this AM.  Patient states she is tolerating diet, passing flatus, had BM, ambulating, voiding.  Denies fevers, chills, chest pain, SOB, palpitations, calf pain.      MEDICATIONS:  acetaminophen     Tablet .. 1000 milliGRAM(s) Oral every 6 hours  albuterol    90 MICROgram(s) HFA Inhaler 2 Puff(s) Inhalation every 6 hours PRN  ALPRAZolam 0.25 milliGRAM(s) Oral every 6 hours PRN  atorvastatin 10 milliGRAM(s) Oral at bedtime  benzocaine 20% Spray 1 Spray(s) Topical every 4 hours PRN  enoxaparin Injectable 40 milliGRAM(s) SubCutaneous every 24 hours  fenofibrate Tablet 145 milliGRAM(s) Oral daily  hydrochlorothiazide 25 milliGRAM(s) Oral daily  influenza  Vaccine (HIGH DOSE) 0.5 milliLiter(s) IntraMuscular once  metoprolol tartrate 100 milliGRAM(s) Oral two times a day  ondansetron Injectable 4 milliGRAM(s) IV Push every 6 hours PRN  oxyCODONE    IR 5 milliGRAM(s) Oral every 4 hours PRN  pantoprazole    Tablet 40 milliGRAM(s) Oral before breakfast  piperacillin/tazobactam IVPB.. 3.375 Gram(s) IV Intermittent every 8 hours  valsartan 320 milliGRAM(s) Oral daily      O:  Vital Signs Last 24 Hrs  T(C): 36.9 (05 Nov 2024 04:54), Max: 36.9 (04 Nov 2024 20:15)  T(F): 98.4 (05 Nov 2024 04:54), Max: 98.5 (04 Nov 2024 20:15)  HR: 92 (05 Nov 2024 04:54) (74 - 92)  BP: 17/77 (05 Nov 2024 04:54) (17/77 - 160/80)  BP(mean): --  RR: 18 (05 Nov 2024 04:54) (18 - 20)  SpO2: 94% (05 Nov 2024 04:54) (94% - 97%)    Parameters below as of 05 Nov 2024 04:54  Patient On (Oxygen Delivery Method): room air        I&O SUMMARY:    11-04-24 @ 07:01  -  11-05-24 @ 07:00  --------------------------------------------------------  IN: 0 mL / OUT: 30 mL / NET: -30 mL        PHYSICAL EXAM:  Lungs: CTA bilat without W/R/R  Card: S1S2  Abd: Soft, NT, ND.  +BS x 4.  No rebound/guarding.  Midline incision packed with packing strips, incision C/D/I.    Ext: Calves soft, NT, without edema bilat    LABS:                        10.2   15.91 )-----------( 482      ( 04 Nov 2024 07:05 )             31.6     11-04    139  |  107  |  25[H]  ----------------------------<  136[H]  3.6   |  27  |  1.20    Ca    9.7      04 Nov 2024 07:05  Phos  2.6     11-04  Mg     2.2     11-04

## 2024-11-05 NOTE — PROGRESS NOTE ADULT - REASON FOR ADMISSION
diverticulitis w/ perforation and abscess
abdominal pain
diverticulitis w/ perforation and abscess

## 2024-11-08 DIAGNOSIS — R60.0 LOCALIZED EDEMA: ICD-10-CM

## 2024-11-08 PROBLEM — R73.03 PREDIABETES: Chronic | Status: ACTIVE | Noted: 2024-10-30

## 2024-11-08 PROBLEM — I10 ESSENTIAL (PRIMARY) HYPERTENSION: Chronic | Status: ACTIVE | Noted: 2024-10-30

## 2024-11-08 PROBLEM — J45.909 UNSPECIFIED ASTHMA, UNCOMPLICATED: Chronic | Status: ACTIVE | Noted: 2024-10-30

## 2024-11-08 PROBLEM — E78.5 HYPERLIPIDEMIA, UNSPECIFIED: Chronic | Status: ACTIVE | Noted: 2024-10-30

## 2024-11-11 ENCOUNTER — APPOINTMENT (OUTPATIENT)
Dept: CARDIOLOGY | Facility: CLINIC | Age: 79
End: 2024-11-11
Payer: MEDICARE

## 2024-11-11 ENCOUNTER — NON-APPOINTMENT (OUTPATIENT)
Age: 79
End: 2024-11-11

## 2024-11-11 VITALS
SYSTOLIC BLOOD PRESSURE: 169 MMHG | DIASTOLIC BLOOD PRESSURE: 88 MMHG | OXYGEN SATURATION: 95 % | BODY MASS INDEX: 35.85 KG/M2 | HEIGHT: 64 IN | HEART RATE: 78 BPM | WEIGHT: 210 LBS

## 2024-11-11 DIAGNOSIS — I10 ESSENTIAL (PRIMARY) HYPERTENSION: ICD-10-CM

## 2024-11-11 DIAGNOSIS — R06.00 DYSPNEA, UNSPECIFIED: ICD-10-CM

## 2024-11-11 PROCEDURE — 93000 ELECTROCARDIOGRAM COMPLETE: CPT

## 2024-11-11 PROCEDURE — 99214 OFFICE O/P EST MOD 30 MIN: CPT

## 2024-11-11 RX ORDER — TORSEMIDE 20 MG/1
20 TABLET ORAL DAILY
Qty: 60 | Refills: 0 | Status: ACTIVE | COMMUNITY
Start: 2024-11-11 | End: 1900-01-01

## 2024-11-11 RX ORDER — FUROSEMIDE 20 MG/1
20 TABLET ORAL
Qty: 60 | Refills: 0 | Status: DISCONTINUED | COMMUNITY
Start: 2024-11-08 | End: 2024-11-11

## 2024-11-15 ENCOUNTER — TRANSCRIPTION ENCOUNTER (OUTPATIENT)
Age: 79
End: 2024-11-15

## 2024-11-18 LAB
ALBUMIN SERPL ELPH-MCNC: 4 G/DL
ALP BLD-CCNC: 66 U/L
ALT SERPL-CCNC: 11 U/L
ANION GAP SERPL CALC-SCNC: 15 MMOL/L
AST SERPL-CCNC: 15 U/L
BILIRUB SERPL-MCNC: 0.5 MG/DL
BUN SERPL-MCNC: 51 MG/DL
CALCIUM SERPL-MCNC: 10 MG/DL
CHLORIDE SERPL-SCNC: 93 MMOL/L
CO2 SERPL-SCNC: 30 MMOL/L
CREAT SERPL-MCNC: 1.53 MG/DL
EGFR: 34 ML/MIN/1.73M2
GLUCOSE SERPL-MCNC: 189 MG/DL
NT-PROBNP SERPL-MCNC: 641 PG/ML
POTASSIUM SERPL-SCNC: 4.1 MMOL/L
PROT SERPL-MCNC: 7.6 G/DL
SODIUM SERPL-SCNC: 138 MMOL/L

## 2024-11-20 PROCEDURE — 71046 X-RAY EXAM CHEST 2 VIEWS: CPT

## 2024-11-26 PROCEDURE — 87075 CULTR BACTERIA EXCEPT BLOOD: CPT

## 2024-11-26 PROCEDURE — 87040 BLOOD CULTURE FOR BACTERIA: CPT

## 2024-11-26 PROCEDURE — 85730 THROMBOPLASTIN TIME PARTIAL: CPT

## 2024-11-26 PROCEDURE — 86923 COMPATIBILITY TEST ELECTRIC: CPT

## 2024-11-26 PROCEDURE — 82962 GLUCOSE BLOOD TEST: CPT

## 2024-11-26 PROCEDURE — 74177 CT ABD & PELVIS W/CONTRAST: CPT | Mod: MC

## 2024-11-26 PROCEDURE — 80053 COMPREHEN METABOLIC PANEL: CPT

## 2024-11-26 PROCEDURE — 85610 PROTHROMBIN TIME: CPT

## 2024-11-26 PROCEDURE — 83735 ASSAY OF MAGNESIUM: CPT

## 2024-11-26 PROCEDURE — 93306 TTE W/DOPPLER COMPLETE: CPT

## 2024-11-26 PROCEDURE — 94640 AIRWAY INHALATION TREATMENT: CPT

## 2024-11-26 PROCEDURE — 36415 COLL VENOUS BLD VENIPUNCTURE: CPT

## 2024-11-26 PROCEDURE — 84100 ASSAY OF PHOSPHORUS: CPT

## 2024-11-26 PROCEDURE — 86901 BLOOD TYPING SEROLOGIC RH(D): CPT

## 2024-11-26 PROCEDURE — 83605 ASSAY OF LACTIC ACID: CPT

## 2024-11-26 PROCEDURE — C1889: CPT

## 2024-11-26 PROCEDURE — 86900 BLOOD TYPING SEROLOGIC ABO: CPT

## 2024-11-26 PROCEDURE — 81003 URINALYSIS AUTO W/O SCOPE: CPT

## 2024-11-26 PROCEDURE — 36430 TRANSFUSION BLD/BLD COMPNT: CPT

## 2024-11-26 PROCEDURE — 99285 EMERGENCY DEPT VISIT HI MDM: CPT | Mod: 25

## 2024-11-26 PROCEDURE — 96368 THER/DIAG CONCURRENT INF: CPT

## 2024-11-26 PROCEDURE — 82803 BLOOD GASES ANY COMBINATION: CPT

## 2024-11-26 PROCEDURE — 87070 CULTURE OTHR SPECIMN AEROBIC: CPT

## 2024-11-26 PROCEDURE — 87205 SMEAR GRAM STAIN: CPT

## 2024-11-26 PROCEDURE — 85027 COMPLETE CBC AUTOMATED: CPT

## 2024-11-26 PROCEDURE — 86850 RBC ANTIBODY SCREEN: CPT

## 2024-11-26 PROCEDURE — 87015 SPECIMEN INFECT AGNT CONCNTJ: CPT

## 2024-11-26 PROCEDURE — 97161 PT EVAL LOW COMPLEX 20 MIN: CPT

## 2024-11-26 PROCEDURE — 96365 THER/PROPH/DIAG IV INF INIT: CPT

## 2024-11-26 PROCEDURE — 83036 HEMOGLOBIN GLYCOSYLATED A1C: CPT

## 2024-11-26 PROCEDURE — 88307 TISSUE EXAM BY PATHOLOGIST: CPT

## 2024-11-26 PROCEDURE — 71045 X-RAY EXAM CHEST 1 VIEW: CPT

## 2024-11-26 PROCEDURE — 85025 COMPLETE CBC W/AUTO DIFF WBC: CPT

## 2024-11-26 PROCEDURE — 80048 BASIC METABOLIC PNL TOTAL CA: CPT

## 2024-11-26 PROCEDURE — P9045: CPT

## 2024-11-26 PROCEDURE — P9016: CPT

## 2024-11-26 PROCEDURE — 93005 ELECTROCARDIOGRAM TRACING: CPT

## 2024-11-26 PROCEDURE — 97116 GAIT TRAINING THERAPY: CPT

## 2024-11-27 ENCOUNTER — APPOINTMENT (OUTPATIENT)
Dept: INTERNAL MEDICINE | Facility: CLINIC | Age: 79
End: 2024-11-27
Payer: MEDICARE

## 2024-11-27 VITALS
DIASTOLIC BLOOD PRESSURE: 80 MMHG | BODY MASS INDEX: 33.63 KG/M2 | SYSTOLIC BLOOD PRESSURE: 140 MMHG | OXYGEN SATURATION: 97 % | HEART RATE: 103 BPM | WEIGHT: 197 LBS | HEIGHT: 64 IN | TEMPERATURE: 98.1 F

## 2024-11-27 DIAGNOSIS — R19.7 DIARRHEA, UNSPECIFIED: ICD-10-CM

## 2024-11-27 DIAGNOSIS — D64.9 ANEMIA, UNSPECIFIED: ICD-10-CM

## 2024-11-27 DIAGNOSIS — N28.9 DISORDER OF KIDNEY AND URETER, UNSPECIFIED: ICD-10-CM

## 2024-11-27 DIAGNOSIS — I50.9 HEART FAILURE, UNSPECIFIED: ICD-10-CM

## 2024-11-27 DIAGNOSIS — E11.9 TYPE 2 DIABETES MELLITUS W/OUT COMPLICATIONS: ICD-10-CM

## 2024-11-27 DIAGNOSIS — B37.9 CANDIDIASIS, UNSPECIFIED: ICD-10-CM

## 2024-11-27 DIAGNOSIS — A04.72 ENTEROCOLITIS DUE TO CLOSTRIDIUM DIFFICILE, NOT SPECIFIED AS RECURRENT: ICD-10-CM

## 2024-11-27 PROCEDURE — 36415 COLL VENOUS BLD VENIPUNCTURE: CPT

## 2024-11-27 PROCEDURE — 99213 OFFICE O/P EST LOW 20 MIN: CPT

## 2024-11-27 PROCEDURE — G2211 COMPLEX E/M VISIT ADD ON: CPT

## 2024-11-27 RX ORDER — NYSTATIN 100000 [USP'U]/G
100000 CREAM TOPICAL TWICE DAILY
Qty: 1 | Refills: 3 | Status: ACTIVE | COMMUNITY
Start: 2024-11-27 | End: 1900-01-01

## 2024-12-02 LAB
ALBUMIN SERPL ELPH-MCNC: 4.4 G/DL
ALP BLD-CCNC: 70 U/L
ALT SERPL-CCNC: 13 U/L
ANION GAP SERPL CALC-SCNC: 13 MMOL/L
AST SERPL-CCNC: 19 U/L
BASOPHILS # BLD AUTO: 0.04 K/UL
BASOPHILS NFR BLD AUTO: 0.5 %
BILIRUB SERPL-MCNC: 0.3 MG/DL
BUN SERPL-MCNC: 44 MG/DL
CALCIUM SERPL-MCNC: 10.3 MG/DL
CHLORIDE SERPL-SCNC: 99 MMOL/L
CO2 SERPL-SCNC: 25 MMOL/L
CREAT SERPL-MCNC: 1.3 MG/DL
EGFR: 42 ML/MIN/1.73M2
EOSINOPHIL # BLD AUTO: 0.21 K/UL
EOSINOPHIL NFR BLD AUTO: 2.6 %
GLUCOSE SERPL-MCNC: 124 MG/DL
HCT VFR BLD CALC: 33.2 %
HGB BLD-MCNC: 10.2 G/DL
IMM GRANULOCYTES NFR BLD AUTO: 0.4 %
LYMPHOCYTES # BLD AUTO: 2.8 K/UL
LYMPHOCYTES NFR BLD AUTO: 34.3 %
MAN DIFF?: NORMAL
MCHC RBC-ENTMCNC: 28.5 PG
MCHC RBC-ENTMCNC: 30.7 G/DL
MCV RBC AUTO: 92.7 FL
MONOCYTES # BLD AUTO: 0.69 K/UL
MONOCYTES NFR BLD AUTO: 8.4 %
NEUTROPHILS # BLD AUTO: 4.4 K/UL
NEUTROPHILS NFR BLD AUTO: 53.8 %
NT-PROBNP SERPL-MCNC: 582 PG/ML
PLATELET # BLD AUTO: 349 K/UL
POTASSIUM SERPL-SCNC: 4.3 MMOL/L
PROT SERPL-MCNC: 7.5 G/DL
RBC # BLD: 3.58 M/UL
RBC # FLD: 15.8 %
SODIUM SERPL-SCNC: 137 MMOL/L
TSH SERPL-ACNC: 1.14 UIU/ML
WBC # FLD AUTO: 8.17 K/UL

## 2024-12-12 ENCOUNTER — RX RENEWAL (OUTPATIENT)
Age: 79
End: 2024-12-12

## 2024-12-12 RX ORDER — VALSARTAN 320 MG/1
320 TABLET, COATED ORAL
Qty: 90 | Refills: 1 | Status: ACTIVE | COMMUNITY
Start: 2024-12-12 | End: 1900-01-01

## 2024-12-17 ENCOUNTER — APPOINTMENT (OUTPATIENT)
Facility: CLINIC | Age: 79
End: 2024-12-17

## 2025-01-06 ENCOUNTER — NON-APPOINTMENT (OUTPATIENT)
Age: 80
End: 2025-01-06

## 2025-01-06 ENCOUNTER — APPOINTMENT (OUTPATIENT)
Dept: COLORECTAL SURGERY | Facility: CLINIC | Age: 80
End: 2025-01-06
Payer: MEDICARE

## 2025-01-06 VITALS
WEIGHT: 195 LBS | RESPIRATION RATE: 14 BRPM | DIASTOLIC BLOOD PRESSURE: 68 MMHG | TEMPERATURE: 97.8 F | BODY MASS INDEX: 33.29 KG/M2 | OXYGEN SATURATION: 99 % | HEART RATE: 77 BPM | HEIGHT: 64 IN | SYSTOLIC BLOOD PRESSURE: 178 MMHG

## 2025-01-06 DIAGNOSIS — Z12.11 ENCOUNTER FOR SCREENING FOR MALIGNANT NEOPLASM OF COLON: ICD-10-CM

## 2025-01-06 DIAGNOSIS — Z43.3 ENCOUNTER FOR ATTENTION TO COLOSTOMY: ICD-10-CM

## 2025-01-06 PROCEDURE — 99204 OFFICE O/P NEW MOD 45 MIN: CPT

## 2025-01-09 ENCOUNTER — TRANSCRIPTION ENCOUNTER (OUTPATIENT)
Age: 80
End: 2025-01-09

## 2025-01-10 RX ORDER — METRONIDAZOLE 500 MG/1
500 TABLET ORAL
Qty: 6 | Refills: 0 | Status: ACTIVE | COMMUNITY
Start: 2025-01-10 | End: 1900-01-01

## 2025-01-10 RX ORDER — NEOMYCIN SULFATE 500 MG/1
500 TABLET ORAL
Qty: 6 | Refills: 0 | Status: ACTIVE | COMMUNITY
Start: 2025-01-10 | End: 1900-01-01

## 2025-01-22 ENCOUNTER — NON-APPOINTMENT (OUTPATIENT)
Age: 80
End: 2025-01-22

## 2025-01-22 ENCOUNTER — APPOINTMENT (OUTPATIENT)
Dept: INTERNAL MEDICINE | Facility: CLINIC | Age: 80
End: 2025-01-22
Payer: MEDICARE

## 2025-01-22 ENCOUNTER — RESULT CHARGE (OUTPATIENT)
Age: 80
End: 2025-01-22

## 2025-01-22 ENCOUNTER — APPOINTMENT (OUTPATIENT)
Dept: CARDIOLOGY | Facility: CLINIC | Age: 80
End: 2025-01-22
Payer: MEDICARE

## 2025-01-22 VITALS
OXYGEN SATURATION: 98 % | HEIGHT: 64 IN | TEMPERATURE: 98.1 F | DIASTOLIC BLOOD PRESSURE: 80 MMHG | HEART RATE: 79 BPM | SYSTOLIC BLOOD PRESSURE: 150 MMHG | WEIGHT: 201.31 LBS | RESPIRATION RATE: 14 BRPM | BODY MASS INDEX: 34.37 KG/M2

## 2025-01-22 VITALS
HEIGHT: 64 IN | SYSTOLIC BLOOD PRESSURE: 148 MMHG | HEART RATE: 88 BPM | WEIGHT: 198 LBS | DIASTOLIC BLOOD PRESSURE: 77 MMHG | OXYGEN SATURATION: 95 % | BODY MASS INDEX: 33.8 KG/M2

## 2025-01-22 VITALS — SYSTOLIC BLOOD PRESSURE: 136 MMHG | DIASTOLIC BLOOD PRESSURE: 78 MMHG

## 2025-01-22 DIAGNOSIS — I51.7 CARDIOMEGALY: ICD-10-CM

## 2025-01-22 DIAGNOSIS — Z43.3 ENCOUNTER FOR ATTENTION TO COLOSTOMY: ICD-10-CM

## 2025-01-22 DIAGNOSIS — I10 ESSENTIAL (PRIMARY) HYPERTENSION: ICD-10-CM

## 2025-01-22 DIAGNOSIS — E11.9 TYPE 2 DIABETES MELLITUS W/OUT COMPLICATIONS: ICD-10-CM

## 2025-01-22 DIAGNOSIS — I25.10 ATHEROSCLEROTIC HEART DISEASE OF NATIVE CORONARY ARTERY W/OUT ANGINA PECTORIS: ICD-10-CM

## 2025-01-22 DIAGNOSIS — E78.5 HYPERLIPIDEMIA, UNSPECIFIED: ICD-10-CM

## 2025-01-22 PROCEDURE — 36415 COLL VENOUS BLD VENIPUNCTURE: CPT

## 2025-01-22 PROCEDURE — G2211 COMPLEX E/M VISIT ADD ON: CPT

## 2025-01-22 PROCEDURE — 99214 OFFICE O/P EST MOD 30 MIN: CPT

## 2025-01-22 PROCEDURE — 93000 ELECTROCARDIOGRAM COMPLETE: CPT

## 2025-01-22 RX ORDER — NEOMYCIN SULFATE 500 MG/1
500 TABLET ORAL
Refills: 0 | Status: ACTIVE | COMMUNITY

## 2025-01-28 LAB
25(OH)D3 SERPL-MCNC: 40.1 NG/ML
ALBUMIN SERPL ELPH-MCNC: 4.3 G/DL
ALP BLD-CCNC: 53 U/L
ALT SERPL-CCNC: 17 U/L
ANION GAP SERPL CALC-SCNC: 11 MMOL/L
AST SERPL-CCNC: 18 U/L
BASOPHILS # BLD AUTO: 0.07 K/UL
BASOPHILS NFR BLD AUTO: 0.7 %
BILIRUB SERPL-MCNC: 0.5 MG/DL
BUN SERPL-MCNC: 38 MG/DL
CALCIUM SERPL-MCNC: 10 MG/DL
CHLORIDE SERPL-SCNC: 101 MMOL/L
CHOLEST SERPL-MCNC: 179 MG/DL
CO2 SERPL-SCNC: 27 MMOL/L
CREAT SERPL-MCNC: 1.36 MG/DL
EGFR: 39 ML/MIN/1.73M2
EOSINOPHIL # BLD AUTO: 0.19 K/UL
EOSINOPHIL NFR BLD AUTO: 2 %
ESTIMATED AVERAGE GLUCOSE: 134 MG/DL
GLUCOSE SERPL-MCNC: 114 MG/DL
HBA1C MFR BLD HPLC: 6.3 %
HCT VFR BLD CALC: 37.6 %
HDLC SERPL-MCNC: 55 MG/DL
HGB BLD-MCNC: 11.2 G/DL
IMM GRANULOCYTES NFR BLD AUTO: 0.7 %
LDLC SERPL CALC-MCNC: 102 MG/DL
LYMPHOCYTES # BLD AUTO: 3.13 K/UL
LYMPHOCYTES NFR BLD AUTO: 32.5 %
MAN DIFF?: NORMAL
MCHC RBC-ENTMCNC: 29 PG
MCHC RBC-ENTMCNC: 29.8 G/DL
MCV RBC AUTO: 97.4 FL
MONOCYTES # BLD AUTO: 0.7 K/UL
MONOCYTES NFR BLD AUTO: 7.3 %
NEUTROPHILS # BLD AUTO: 5.46 K/UL
NEUTROPHILS NFR BLD AUTO: 56.8 %
NONHDLC SERPL-MCNC: 123 MG/DL
PLATELET # BLD AUTO: 358 K/UL
POTASSIUM SERPL-SCNC: 4.5 MMOL/L
PROT SERPL-MCNC: 7.5 G/DL
RBC # BLD: 3.86 M/UL
RBC # FLD: 16.1 %
SODIUM SERPL-SCNC: 139 MMOL/L
TRIGL SERPL-MCNC: 119 MG/DL
TSH SERPL-ACNC: 1.34 UIU/ML
WBC # FLD AUTO: 9.62 K/UL

## 2025-02-13 ENCOUNTER — APPOINTMENT (OUTPATIENT)
Dept: INTERNAL MEDICINE | Facility: CLINIC | Age: 80
End: 2025-02-13

## 2025-02-25 ENCOUNTER — OUTPATIENT (OUTPATIENT)
Dept: OUTPATIENT SERVICES | Facility: HOSPITAL | Age: 80
LOS: 1 days | End: 2025-02-25
Payer: MEDICARE

## 2025-02-25 ENCOUNTER — APPOINTMENT (OUTPATIENT)
Dept: COLORECTAL SURGERY | Facility: HOSPITAL | Age: 80
End: 2025-02-25
Payer: MEDICARE

## 2025-02-25 DIAGNOSIS — Z12.11 ENCOUNTER FOR SCREENING FOR MALIGNANT NEOPLASM OF COLON: ICD-10-CM

## 2025-02-25 PROCEDURE — 88305 TISSUE EXAM BY PATHOLOGIST: CPT

## 2025-02-25 PROCEDURE — 44389 COLONOSCOPY WITH BIOPSY: CPT | Mod: PT

## 2025-02-25 PROCEDURE — 44382 SMALL BOWEL ENDOSCOPY: CPT

## 2025-02-25 PROCEDURE — 45300 PROCTOSIGMOIDOSCOPY DX: CPT

## 2025-02-25 PROCEDURE — 88305 TISSUE EXAM BY PATHOLOGIST: CPT | Mod: 26

## 2025-02-25 DEVICE — CLIP RESOLUTION 360 235CM: Type: IMPLANTABLE DEVICE | Status: FUNCTIONAL

## 2025-02-25 DEVICE — HEMOSPRAY HEMOSTAT ENDO 7F: Type: IMPLANTABLE DEVICE | Status: FUNCTIONAL

## 2025-03-05 ENCOUNTER — RX RENEWAL (OUTPATIENT)
Age: 80
End: 2025-03-05

## 2025-03-12 ENCOUNTER — OUTPATIENT (OUTPATIENT)
Dept: OUTPATIENT SERVICES | Facility: HOSPITAL | Age: 80
LOS: 1 days | End: 2025-03-12
Payer: MEDICARE

## 2025-03-12 VITALS
HEART RATE: 62 BPM | TEMPERATURE: 98 F | DIASTOLIC BLOOD PRESSURE: 70 MMHG | HEIGHT: 63 IN | SYSTOLIC BLOOD PRESSURE: 125 MMHG | WEIGHT: 197.98 LBS | RESPIRATION RATE: 18 BRPM | OXYGEN SATURATION: 97 %

## 2025-03-12 DIAGNOSIS — E78.5 HYPERLIPIDEMIA, UNSPECIFIED: ICD-10-CM

## 2025-03-12 DIAGNOSIS — Z98.890 OTHER SPECIFIED POSTPROCEDURAL STATES: Chronic | ICD-10-CM

## 2025-03-12 DIAGNOSIS — Z43.3 ENCOUNTER FOR ATTENTION TO COLOSTOMY: ICD-10-CM

## 2025-03-12 DIAGNOSIS — J45.909 UNSPECIFIED ASTHMA, UNCOMPLICATED: ICD-10-CM

## 2025-03-12 DIAGNOSIS — Z01.818 ENCOUNTER FOR OTHER PREPROCEDURAL EXAMINATION: ICD-10-CM

## 2025-03-12 DIAGNOSIS — I10 ESSENTIAL (PRIMARY) HYPERTENSION: ICD-10-CM

## 2025-03-12 LAB
ANION GAP SERPL CALC-SCNC: 7 MMOL/L — SIGNIFICANT CHANGE UP (ref 5–17)
BUN SERPL-MCNC: 38 MG/DL — HIGH (ref 7–23)
CALCIUM SERPL-MCNC: 9.3 MG/DL — SIGNIFICANT CHANGE UP (ref 8.5–10.1)
CHLORIDE SERPL-SCNC: 102 MMOL/L — SIGNIFICANT CHANGE UP (ref 96–108)
CO2 SERPL-SCNC: 29 MMOL/L — SIGNIFICANT CHANGE UP (ref 22–31)
CREAT SERPL-MCNC: 1.2 MG/DL — SIGNIFICANT CHANGE UP (ref 0.5–1.3)
EGFR: 46 ML/MIN/1.73M2 — LOW
EGFR: 46 ML/MIN/1.73M2 — LOW
GLUCOSE SERPL-MCNC: 107 MG/DL — HIGH (ref 70–99)
HCT VFR BLD CALC: 35 % — SIGNIFICANT CHANGE UP (ref 34.5–45)
HGB BLD-MCNC: 11.3 G/DL — LOW (ref 11.5–15.5)
MCHC RBC-ENTMCNC: 29 PG — SIGNIFICANT CHANGE UP (ref 27–34)
MCHC RBC-ENTMCNC: 32.3 G/DL — SIGNIFICANT CHANGE UP (ref 32–36)
MCV RBC AUTO: 89.7 FL — SIGNIFICANT CHANGE UP (ref 80–100)
NRBC BLD AUTO-RTO: 0 /100 WBCS — SIGNIFICANT CHANGE UP (ref 0–0)
PLATELET # BLD AUTO: 308 K/UL — SIGNIFICANT CHANGE UP (ref 150–400)
POTASSIUM SERPL-MCNC: 4.2 MMOL/L — SIGNIFICANT CHANGE UP (ref 3.5–5.3)
POTASSIUM SERPL-SCNC: 4.2 MMOL/L — SIGNIFICANT CHANGE UP (ref 3.5–5.3)
RBC # BLD: 3.9 M/UL — SIGNIFICANT CHANGE UP (ref 3.8–5.2)
RBC # FLD: 13.7 % — SIGNIFICANT CHANGE UP (ref 10.3–14.5)
SODIUM SERPL-SCNC: 138 MMOL/L — SIGNIFICANT CHANGE UP (ref 135–145)
WBC # BLD: 10.17 K/UL — SIGNIFICANT CHANGE UP (ref 3.8–10.5)
WBC # FLD AUTO: 10.17 K/UL — SIGNIFICANT CHANGE UP (ref 3.8–10.5)

## 2025-03-12 PROCEDURE — 36415 COLL VENOUS BLD VENIPUNCTURE: CPT

## 2025-03-12 PROCEDURE — 86900 BLOOD TYPING SEROLOGIC ABO: CPT

## 2025-03-12 PROCEDURE — 86901 BLOOD TYPING SEROLOGIC RH(D): CPT

## 2025-03-12 PROCEDURE — 85027 COMPLETE CBC AUTOMATED: CPT

## 2025-03-12 PROCEDURE — 87640 STAPH A DNA AMP PROBE: CPT

## 2025-03-12 PROCEDURE — G0463: CPT

## 2025-03-12 PROCEDURE — 80048 BASIC METABOLIC PNL TOTAL CA: CPT

## 2025-03-12 PROCEDURE — 87641 MR-STAPH DNA AMP PROBE: CPT

## 2025-03-12 PROCEDURE — 86850 RBC ANTIBODY SCREEN: CPT

## 2025-03-12 NOTE — H&P PST ADULT - NSICDXPROCEDURE_GEN_ALL_CORE_FT
PROCEDURES:  Robot-assisted laparoscopic revision or reversal of colostomy or ileostomy 12-Mar-2025 11:53:02  Racquel Chamorro

## 2025-03-12 NOTE — H&P PST ADULT - PROBLEM SELECTOR PLAN 1
scheduled for robotic assisted laparoscopic poss open-colostomy reversal-on 3/25/25  Labs- CBC, BMP, T&S, MRSA/MSSA  Pre op instructions discussed, including skin prep-verbalized understanding  Pre op PCP eval on 3/14/25  Card eval enclosed in chart

## 2025-03-12 NOTE — H&P PST ADULT - HISTORY OF PRESENT ILLNESS
80 year old female with PMHx HTN, well-controlled asthma, HLD, diverticulitis  was admitted to  ED with perforated diverticulitis on 10/29/24. She underwent exploratory laparotomy , colon resection and colostomy. Pt had f/u surgical consult- scheduled for robotic assisted laparoscopic poss open-colostomy reversal-on 3/25/25    **Denies any fever, CP/SOB , abdominal pain or sick contacts

## 2025-03-12 NOTE — H&P PST ADULT - NSICDXPASTMEDICALHX_GEN_ALL_CORE_FT
PAST MEDICAL HISTORY:  Asthma, well controlled     History of diverticulitis of colon     Hyperlipidemia     Hypertension     Prediabetes

## 2025-03-13 LAB
MRSA PCR RESULT.: SIGNIFICANT CHANGE UP
S AUREUS DNA NOSE QL NAA+PROBE: SIGNIFICANT CHANGE UP

## 2025-03-14 ENCOUNTER — APPOINTMENT (OUTPATIENT)
Dept: INTERNAL MEDICINE | Facility: CLINIC | Age: 80
End: 2025-03-14
Payer: MEDICARE

## 2025-03-14 VITALS
DIASTOLIC BLOOD PRESSURE: 78 MMHG | HEIGHT: 64 IN | WEIGHT: 200 LBS | BODY MASS INDEX: 34.15 KG/M2 | HEART RATE: 72 BPM | SYSTOLIC BLOOD PRESSURE: 122 MMHG | TEMPERATURE: 97.8 F | OXYGEN SATURATION: 98 %

## 2025-03-14 DIAGNOSIS — Z01.818 ENCOUNTER FOR OTHER PREPROCEDURAL EXAMINATION: ICD-10-CM

## 2025-03-14 PROCEDURE — 93000 ELECTROCARDIOGRAM COMPLETE: CPT

## 2025-03-14 PROCEDURE — G2211 COMPLEX E/M VISIT ADD ON: CPT

## 2025-03-14 PROCEDURE — 99215 OFFICE O/P EST HI 40 MIN: CPT

## 2025-03-23 RX ORDER — SODIUM CHLORIDE 9 G/1000ML
1000 INJECTION, SOLUTION INTRAVENOUS
Refills: 0 | Status: DISCONTINUED | OUTPATIENT
Start: 2025-03-25 | End: 2025-03-25

## 2025-03-25 ENCOUNTER — TRANSCRIPTION ENCOUNTER (OUTPATIENT)
Age: 80
End: 2025-03-25

## 2025-03-25 ENCOUNTER — INPATIENT (INPATIENT)
Facility: HOSPITAL | Age: 80
LOS: 2 days | Discharge: HOME | DRG: 395 | End: 2025-03-28
Attending: COLON & RECTAL SURGERY | Admitting: COLON & RECTAL SURGERY
Payer: MEDICARE

## 2025-03-25 ENCOUNTER — APPOINTMENT (OUTPATIENT)
Dept: COLORECTAL SURGERY | Facility: HOSPITAL | Age: 80
End: 2025-03-25

## 2025-03-25 VITALS
RESPIRATION RATE: 14 BRPM | HEIGHT: 63 IN | OXYGEN SATURATION: 98 % | HEART RATE: 68 BPM | TEMPERATURE: 98 F | SYSTOLIC BLOOD PRESSURE: 132 MMHG | DIASTOLIC BLOOD PRESSURE: 76 MMHG | WEIGHT: 197.98 LBS

## 2025-03-25 DIAGNOSIS — Z43.3 ENCOUNTER FOR ATTENTION TO COLOSTOMY: ICD-10-CM

## 2025-03-25 DIAGNOSIS — Z98.890 OTHER SPECIFIED POSTPROCEDURAL STATES: Chronic | ICD-10-CM

## 2025-03-25 DIAGNOSIS — R11.0 NAUSEA: ICD-10-CM

## 2025-03-25 PROCEDURE — 44626 REPAIR BOWEL OPENING: CPT

## 2025-03-25 PROCEDURE — 44227 LAP CLOSE ENTEROSTOMY: CPT | Mod: AS

## 2025-03-25 PROCEDURE — 88304 TISSUE EXAM BY PATHOLOGIST: CPT | Mod: 26

## 2025-03-25 PROCEDURE — S2900 ROBOTIC SURGICAL SYSTEM: CPT | Mod: NC

## 2025-03-25 PROCEDURE — 45300 PROCTOSIGMOIDOSCOPY DX: CPT

## 2025-03-25 DEVICE — STAPLER COVIDIEN EEA CIRCULAR DST 28MM 3.5MM BLUE: Type: IMPLANTABLE DEVICE | Status: FUNCTIONAL

## 2025-03-25 DEVICE — XI STAPLER SUREFORM RELOAD 60 BLUE: Type: IMPLANTABLE DEVICE | Status: FUNCTIONAL

## 2025-03-25 DEVICE — STAPLER COVIDIEN PURSTRING 65MM: Type: IMPLANTABLE DEVICE | Status: FUNCTIONAL

## 2025-03-25 DEVICE — SURGICEL POWDER 3 GRAMS: Type: IMPLANTABLE DEVICE | Status: FUNCTIONAL

## 2025-03-25 RX ORDER — CEFOTETAN DISODIUM 1 G
2 VIAL (EA) INJECTION ONCE
Refills: 0 | Status: COMPLETED | OUTPATIENT
Start: 2025-03-25 | End: 2025-03-25

## 2025-03-25 RX ORDER — SODIUM CHLORIDE 9 G/1000ML
1000 INJECTION, SOLUTION INTRAVENOUS
Refills: 0 | Status: DISCONTINUED | OUTPATIENT
Start: 2025-03-25 | End: 2025-03-25

## 2025-03-25 RX ORDER — OXYCODONE HYDROCHLORIDE 30 MG/1
2.5 TABLET ORAL EVERY 4 HOURS
Refills: 0 | Status: DISCONTINUED | OUTPATIENT
Start: 2025-03-25 | End: 2025-03-25

## 2025-03-25 RX ORDER — CEFOTETAN DISODIUM 1 G
2 VIAL (EA) INJECTION EVERY 12 HOURS
Refills: 0 | Status: COMPLETED | OUTPATIENT
Start: 2025-03-25 | End: 2025-03-26

## 2025-03-25 RX ORDER — CYANOCOBALAMIN 1000 UG/ML
1 INJECTION INTRAMUSCULAR; SUBCUTANEOUS
Refills: 0 | DISCHARGE

## 2025-03-25 RX ORDER — ACETAMINOPHEN 500 MG/5ML
1000 LIQUID (ML) ORAL EVERY 6 HOURS
Refills: 0 | Status: DISCONTINUED | OUTPATIENT
Start: 2025-03-26 | End: 2025-03-26

## 2025-03-25 RX ORDER — ACETAMINOPHEN 500 MG/5ML
975 LIQUID (ML) ORAL EVERY 6 HOURS
Refills: 0 | Status: DISCONTINUED | OUTPATIENT
Start: 2025-03-25 | End: 2025-03-25

## 2025-03-25 RX ORDER — LEVALBUTEROL HYDROCHLORIDE 1.25 MG/3ML
2 SOLUTION RESPIRATORY (INHALATION)
Refills: 0 | DISCHARGE

## 2025-03-25 RX ORDER — OXYCODONE HYDROCHLORIDE 30 MG/1
5 TABLET ORAL ONCE
Refills: 0 | Status: DISCONTINUED | OUTPATIENT
Start: 2025-03-25 | End: 2025-03-25

## 2025-03-25 RX ORDER — HYDROMORPHONE/SOD CHLOR,ISO/PF 2 MG/10 ML
0.5 SYRINGE (ML) INJECTION
Refills: 0 | Status: DISCONTINUED | OUTPATIENT
Start: 2025-03-25 | End: 2025-03-25

## 2025-03-25 RX ORDER — SODIUM CHLORIDE 9 G/1000ML
1000 INJECTION, SOLUTION INTRAVENOUS
Refills: 0 | Status: DISCONTINUED | OUTPATIENT
Start: 2025-03-25 | End: 2025-03-26

## 2025-03-25 RX ORDER — FENOFIBRATE 160 MG/1
145 TABLET ORAL EVERY 24 HOURS
Refills: 0 | Status: DISCONTINUED | OUTPATIENT
Start: 2025-03-26 | End: 2025-03-28

## 2025-03-25 RX ORDER — ONDANSETRON HCL/PF 4 MG/2 ML
4 VIAL (ML) INJECTION ONCE
Refills: 0 | Status: DISCONTINUED | OUTPATIENT
Start: 2025-03-25 | End: 2025-03-25

## 2025-03-25 RX ORDER — HYDROMORPHONE/SOD CHLOR,ISO/PF 2 MG/10 ML
0.5 SYRINGE (ML) INJECTION ONCE
Refills: 0 | Status: DISCONTINUED | OUTPATIENT
Start: 2025-03-25 | End: 2025-03-25

## 2025-03-25 RX ORDER — OXYCODONE HYDROCHLORIDE 30 MG/1
2.5 TABLET ORAL EVERY 4 HOURS
Refills: 0 | Status: DISCONTINUED | OUTPATIENT
Start: 2025-03-25 | End: 2025-03-28

## 2025-03-25 RX ORDER — METOPROLOL SUCCINATE 50 MG/1
100 TABLET, EXTENDED RELEASE ORAL
Refills: 0 | Status: DISCONTINUED | OUTPATIENT
Start: 2025-03-25 | End: 2025-03-28

## 2025-03-25 RX ORDER — HYDROMORPHONE/SOD CHLOR,ISO/PF 2 MG/10 ML
0.5 SYRINGE (ML) INJECTION ONCE
Refills: 0 | Status: DISCONTINUED | OUTPATIENT
Start: 2025-03-25 | End: 2025-03-28

## 2025-03-25 RX ORDER — FLUTICASONE FUROATE AND VILANTEROL TRIFENATATE 100; 25 UG/1; UG/1
1 POWDER RESPIRATORY (INHALATION)
Refills: 0 | DISCHARGE

## 2025-03-25 RX ORDER — OXYCODONE HYDROCHLORIDE 30 MG/1
5 TABLET ORAL EVERY 4 HOURS
Refills: 0 | Status: DISCONTINUED | OUTPATIENT
Start: 2025-03-25 | End: 2025-03-25

## 2025-03-25 RX ORDER — ATORVASTATIN CALCIUM 80 MG/1
10 TABLET, FILM COATED ORAL DAILY
Refills: 0 | Status: DISCONTINUED | OUTPATIENT
Start: 2025-03-26 | End: 2025-03-28

## 2025-03-25 RX ORDER — KETOROLAC TROMETHAMINE 30 MG/ML
15 INJECTION, SOLUTION INTRAMUSCULAR; INTRAVENOUS ONCE
Refills: 0 | Status: DISCONTINUED | OUTPATIENT
Start: 2025-03-25 | End: 2025-03-25

## 2025-03-25 RX ORDER — OXYCODONE HYDROCHLORIDE 30 MG/1
5 TABLET ORAL EVERY 4 HOURS
Refills: 0 | Status: DISCONTINUED | OUTPATIENT
Start: 2025-03-25 | End: 2025-03-28

## 2025-03-25 RX ORDER — ACETAMINOPHEN 500 MG/5ML
1000 LIQUID (ML) ORAL EVERY 6 HOURS
Refills: 0 | Status: COMPLETED | OUTPATIENT
Start: 2025-03-25 | End: 2025-03-26

## 2025-03-25 RX ORDER — ONDANSETRON HCL/PF 4 MG/2 ML
4 VIAL (ML) INJECTION EVERY 6 HOURS
Refills: 0 | Status: DISCONTINUED | OUTPATIENT
Start: 2025-03-25 | End: 2025-03-28

## 2025-03-25 RX ORDER — UBIDECARENONE 100 MG
1 CAPSULE ORAL
Refills: 0 | DISCHARGE

## 2025-03-25 RX ADMIN — Medication 400 MILLIGRAM(S): at 16:45

## 2025-03-25 RX ADMIN — Medication 100 GRAM(S): at 18:41

## 2025-03-25 RX ADMIN — Medication 1000 MILLIGRAM(S): at 22:00

## 2025-03-25 RX ADMIN — KETOROLAC TROMETHAMINE 15 MILLIGRAM(S): 30 INJECTION, SOLUTION INTRAMUSCULAR; INTRAVENOUS at 21:11

## 2025-03-25 RX ADMIN — SODIUM CHLORIDE 75 MILLILITER(S): 9 INJECTION, SOLUTION INTRAVENOUS at 13:55

## 2025-03-25 RX ADMIN — KETOROLAC TROMETHAMINE 15 MILLIGRAM(S): 30 INJECTION, SOLUTION INTRAMUSCULAR; INTRAVENOUS at 21:30

## 2025-03-25 RX ADMIN — Medication 400 MILLIGRAM(S): at 22:06

## 2025-03-25 RX ADMIN — METOPROLOL SUCCINATE 100 MILLIGRAM(S): 50 TABLET, EXTENDED RELEASE ORAL at 17:32

## 2025-03-25 RX ADMIN — OXYCODONE HYDROCHLORIDE 2.5 MILLIGRAM(S): 30 TABLET ORAL at 23:00

## 2025-03-25 RX ADMIN — OXYCODONE HYDROCHLORIDE 2.5 MILLIGRAM(S): 30 TABLET ORAL at 23:30

## 2025-03-25 NOTE — BRIEF OPERATIVE NOTE - COMMENTS
I, Miles Rangel PA-C provided direct first assist support to the surgeon during this surgical procedure. My involvement included positioning, prepping and draping the patient prior to surgery, robotic port placement, robotic docking, robotic instrument insertion and removal, ensuring clear visibility and exposure for the surgeon by using instruments such as retractors, suction and sponges, retrieving specimens from the operative field, closing surgical incisions, undocking the robot and dressing wounds.  As well as other tasks as directed by the surgeon.

## 2025-03-26 ENCOUNTER — TRANSCRIPTION ENCOUNTER (OUTPATIENT)
Age: 80
End: 2025-03-26

## 2025-03-26 LAB
ANION GAP SERPL CALC-SCNC: 10 MMOL/L — SIGNIFICANT CHANGE UP (ref 5–17)
BUN SERPL-MCNC: 39 MG/DL — HIGH (ref 7–23)
CALCIUM SERPL-MCNC: 8.2 MG/DL — LOW (ref 8.5–10.1)
CHLORIDE SERPL-SCNC: 99 MMOL/L — SIGNIFICANT CHANGE UP (ref 96–108)
CO2 SERPL-SCNC: 24 MMOL/L — SIGNIFICANT CHANGE UP (ref 22–31)
CREAT SERPL-MCNC: 2.5 MG/DL — HIGH (ref 0.5–1.3)
EGFR: 19 ML/MIN/1.73M2 — LOW
EGFR: 19 ML/MIN/1.73M2 — LOW
GLUCOSE SERPL-MCNC: 128 MG/DL — HIGH (ref 70–99)
HCT VFR BLD CALC: 26.7 % — LOW (ref 34.5–45)
HGB BLD-MCNC: 8.8 G/DL — LOW (ref 11.5–15.5)
MAGNESIUM SERPL-MCNC: 1.7 MG/DL — SIGNIFICANT CHANGE UP (ref 1.6–2.6)
MCHC RBC-ENTMCNC: 29.6 PG — SIGNIFICANT CHANGE UP (ref 27–34)
MCHC RBC-ENTMCNC: 33 G/DL — SIGNIFICANT CHANGE UP (ref 32–36)
MCV RBC AUTO: 89.9 FL — SIGNIFICANT CHANGE UP (ref 80–100)
NRBC BLD AUTO-RTO: 0 /100 WBCS — SIGNIFICANT CHANGE UP (ref 0–0)
PHOSPHATE SERPL-MCNC: 3.9 MG/DL — SIGNIFICANT CHANGE UP (ref 2.5–4.5)
PLATELET # BLD AUTO: 222 K/UL — SIGNIFICANT CHANGE UP (ref 150–400)
POTASSIUM SERPL-MCNC: 4 MMOL/L — SIGNIFICANT CHANGE UP (ref 3.5–5.3)
POTASSIUM SERPL-SCNC: 4 MMOL/L — SIGNIFICANT CHANGE UP (ref 3.5–5.3)
RBC # BLD: 2.97 M/UL — LOW (ref 3.8–5.2)
RBC # FLD: 13.6 % — SIGNIFICANT CHANGE UP (ref 10.3–14.5)
SODIUM SERPL-SCNC: 133 MMOL/L — LOW (ref 135–145)
WBC # BLD: 13.28 K/UL — HIGH (ref 3.8–10.5)
WBC # FLD AUTO: 13.28 K/UL — HIGH (ref 3.8–10.5)

## 2025-03-26 RX ORDER — LIDOCAINE HYDROCHLORIDE 20 MG/ML
1 JELLY TOPICAL DAILY
Refills: 0 | Status: DISCONTINUED | OUTPATIENT
Start: 2025-03-26 | End: 2025-03-28

## 2025-03-26 RX ORDER — SODIUM CHLORIDE 9 G/1000ML
1000 INJECTION, SOLUTION INTRAVENOUS
Refills: 0 | Status: DISCONTINUED | OUTPATIENT
Start: 2025-03-26 | End: 2025-03-26

## 2025-03-26 RX ORDER — SODIUM CHLORIDE 9 G/1000ML
500 INJECTION, SOLUTION INTRAVENOUS ONCE
Refills: 0 | Status: COMPLETED | OUTPATIENT
Start: 2025-03-26 | End: 2025-03-26

## 2025-03-26 RX ORDER — ACETAMINOPHEN 500 MG/5ML
1000 LIQUID (ML) ORAL ONCE
Refills: 0 | Status: COMPLETED | OUTPATIENT
Start: 2025-03-27 | End: 2025-03-27

## 2025-03-26 RX ORDER — ACETAMINOPHEN 500 MG/5ML
1000 LIQUID (ML) ORAL EVERY 6 HOURS
Refills: 0 | Status: DISCONTINUED | OUTPATIENT
Start: 2025-03-27 | End: 2025-03-28

## 2025-03-26 RX ORDER — ACETAMINOPHEN 500 MG/5ML
1000 LIQUID (ML) ORAL ONCE
Refills: 0 | Status: COMPLETED | OUTPATIENT
Start: 2025-03-26 | End: 2025-03-26

## 2025-03-26 RX ORDER — HEPARIN SODIUM 1000 [USP'U]/ML
5000 INJECTION INTRAVENOUS; SUBCUTANEOUS EVERY 8 HOURS
Refills: 0 | Status: DISCONTINUED | OUTPATIENT
Start: 2025-03-26 | End: 2025-03-28

## 2025-03-26 RX ADMIN — Medication 1000 MILLILITER(S): at 14:14

## 2025-03-26 RX ADMIN — HEPARIN SODIUM 5000 UNIT(S): 1000 INJECTION INTRAVENOUS; SUBCUTANEOUS at 13:14

## 2025-03-26 RX ADMIN — Medication 400 MILLIGRAM(S): at 04:41

## 2025-03-26 RX ADMIN — Medication 100 MILLILITER(S): at 09:49

## 2025-03-26 RX ADMIN — Medication 1000 MILLIGRAM(S): at 15:58

## 2025-03-26 RX ADMIN — Medication 400 MILLIGRAM(S): at 20:11

## 2025-03-26 RX ADMIN — LIDOCAINE HYDROCHLORIDE 1 PATCH: 20 JELLY TOPICAL at 14:14

## 2025-03-26 RX ADMIN — HEPARIN SODIUM 5000 UNIT(S): 1000 INJECTION INTRAVENOUS; SUBCUTANEOUS at 21:19

## 2025-03-26 RX ADMIN — Medication 1000 MILLIGRAM(S): at 10:13

## 2025-03-26 RX ADMIN — ATORVASTATIN CALCIUM 10 MILLIGRAM(S): 80 TABLET, FILM COATED ORAL at 11:25

## 2025-03-26 RX ADMIN — FENOFIBRATE 145 MILLIGRAM(S): 160 TABLET ORAL at 11:24

## 2025-03-26 RX ADMIN — SODIUM CHLORIDE 1000 MILLILITER(S): 9 INJECTION, SOLUTION INTRAVENOUS at 06:22

## 2025-03-26 RX ADMIN — Medication 400 MILLIGRAM(S): at 09:49

## 2025-03-26 RX ADMIN — METOPROLOL SUCCINATE 100 MILLIGRAM(S): 50 TABLET, EXTENDED RELEASE ORAL at 17:12

## 2025-03-26 RX ADMIN — Medication 4 MILLIGRAM(S): at 22:57

## 2025-03-26 RX ADMIN — LIDOCAINE HYDROCHLORIDE 1 PATCH: 20 JELLY TOPICAL at 19:00

## 2025-03-26 RX ADMIN — Medication 100 GRAM(S): at 05:21

## 2025-03-26 RX ADMIN — Medication 1000 MILLIGRAM(S): at 14:55

## 2025-03-26 NOTE — CARE COORDINATION ASSESSMENT. - ASSESSMENT CONCERNS TO BE ADDRESSED
80 year old female with PMHx HTN, well-controlled asthma, HLD, diverticulitis  was admitted to  ED with perforated diverticulitis on 10/29/24. She underwent exploratory laparotomy , colon resection and colostomy/care coordination/discharge planning

## 2025-03-26 NOTE — PROGRESS NOTE ADULT - ATTENDING COMMENTS
SE  Feels sore.  Camila clear liquids without N/V.  Passing gas but unknown if stool.  Not yet OOB.    Vital Signs Last 24 Hrs  T(C): 36.6 (26 Mar 2025 07:47), Max: 36.9 (26 Mar 2025 00:04)  T(F): 97.9 (26 Mar 2025 07:47), Max: 98.5 (26 Mar 2025 00:04)  HR: 80 (26 Mar 2025 07:47) (60 - 80)  BP: 100/59 (26 Mar 2025 07:47) (94/52 - 138/70)  BP(mean): --  RR: 17 (26 Mar 2025 07:47) (17 - 20)  SpO2: 94% (26 Mar 2025 07:47) (94% - 100%)    soft, tender, mild distention                          8.8    13.28 )-----------( 222      ( 26 Mar 2025 07:30 )             26.7   03-26    133[L]  |  99  |  39[H]  ----------------------------<  128[H]  4.0   |  24  |  2.50[H]    Ca    8.2[L]      26 Mar 2025 07:30  Phos  3.9     03-26  Mg     1.7     03-26    POD 1    Will advance to full liquid diet.  Cr doubled but with clear urine output. Was hypotensive on induction and intermittently throughout the OR case. Suspect component of LAURI from dehydration. Will cont IVF and jacome catheter for today. Trend Cr. Avoid nephortoxic meds. Valsartan discontinued.  Hgb 8.8, postop acute blood loss anemia. Will start hep SQ.  PT/OT and OOB.

## 2025-03-26 NOTE — DISCHARGE NOTE PROVIDER - NSDCFUADDINST_GEN_ALL_CORE_FT
1. Call the office today or tomorrow to schedule a follow-up appointment for 7 – 10 days after discharge.    2. If you had part of your intestine removed, for the first 2-3 weeks you should be on a low residue/fiber diet. This just means you need to avoid the following 4 things:  – Raw fruits  – Raw vegetables (any vegetables have to be cooked VERY soft)  – Nuts, seeds or corn  – Leafy greens (e.g. spinach, kale, brendan greens, lettuce)    3. Make sure you drink lots of fluid (6-8 8oz glasses of water or other liquids a day)    4. You may shower and let water and soap run across your incision(s). Pat dry afterwards. Do not soak (e.g. bathtub or swimming pool) your incisions. If you have staples they will usually be removed in the office during your follow-up appointment. If you have steri-strips or dermabond (surgical glue) they will come off on their own (usually in 1-2 weeks).    5. You can restart your home medications unless instructed otherwise in the hospital. Confirm which blood thinners (e.g. aspirin, coumadin, Eliquis, Plavix, etc…) you are allowed to restart and when. It is recommended that you see your primary care doctor within a week of discharge to let them know you recently had surgery and to discuss any medication changes.    6. You can walk as much as you would like (walking is encouraged) and you can climb stairs. No heavy lifting (> 15lbs) or strenuous activity for 4 weeks. You cannot drive if you are taking narcotic medications or having distracting pain. 1. Call the office today or tomorrow to schedule a follow-up appointment for 7 – 10 days after discharge.    2. If you had part of your intestine removed, for the first 2-3 weeks you should be on a low residue/fiber diet. This just means you need to avoid the following 4 things:  – Raw fruits  – Raw vegetables (any vegetables have to be cooked VERY soft)  – Nuts, seeds or corn  – Leafy greens (e.g. spinach, kale, brendan greens, lettuce)    3. Make sure you drink lots of fluid (6-8 8oz glasses of water or other liquids a day)    4. You may shower and let water and soap run across your incision(s). Pat dry afterwards. Do not soak (e.g. bathtub or swimming pool) your incisions. If you have staples they will usually be removed in the office during your follow-up appointment. If you have steri-strips or dermabond (surgical glue) they will come off on their own (usually in 1-2 weeks).    5. You can restart your home medications unless instructed otherwise in the hospital. Confirm which blood thinners (e.g. aspirin, coumadin, Eliquis, Plavix, etc…) you are allowed to restart and when. It is recommended that you see your primary care doctor within a week of discharge to let them know you recently had surgery and to discuss any medication changes.    6. You can walk as much as you would like (walking is encouraged) and you can climb stairs. No heavy lifting (> 15lbs) or strenuous activity for 4 weeks. You cannot drive if you are taking narcotic medications or having distracting pain.    Wound Care Instructions:  Daily dry sterile dressing to be replaced with gauze and tegaderm

## 2025-03-26 NOTE — CARE COORDINATION ASSESSMENT. - OTHER PERTINENT DISCHARGE PLANNING INFORMATION:
Met patient at bedside.  Explained role of CM, verbalized understanding. Pt was made aware a CM will remain available through hospitalization.  Contact information given in discharge/ transitions resource folder. Patient states she lives alone in a home w/13 steps to shower. Reports her daughters come every weekend to assist and will assist her while she recovers. Has a walker at home but does not use it. Independent with activities of daily living. Daughter Aaron will transport her home. Is known to [facility].

## 2025-03-26 NOTE — CARE COORDINATION ASSESSMENT. - NSPASTMEDSURGHISTORY_GEN_ALL_CORE_FT
PAST MEDICAL & SURGICAL HISTORY:  Prediabetes      Asthma, well controlled      Hyperlipidemia      Hypertension      History of diverticulitis of colon      S/P colonoscopy with polypectomy      History of colon resection

## 2025-03-26 NOTE — DISCHARGE NOTE PROVIDER - NSDCMRMEDTOKEN_GEN_ALL_CORE_FT
atorvastatin 10 mg oral tablet: 1 tab(s) orally once a day (in the morning)  B-12 500 mcg sublingual tablet: 1 tab(s) sublingually once a day (in the morning)  Breo Ellipta 100 mcg-25 mcg/inh inhalation powder: 1 inhaled prn  Calcium 1000mg/Dxt764zh/Zinc w/D325mg daily AM:   cholecalciferol 25 mcg (1000 intl units) oral tablet: 3 tab(s) orally once a day (in the morning)  Co Q-10 100 mg oral capsule: 1 cap(s) orally once a day (in the morning)  fenofibrate 160 mg oral tablet: 1 tab(s) orally once a day (in the morning)  hydroCHLOROthiazide 25 mg oral tablet: 1 tab(s) orally once a day (in the morning)  levalbuterol 45 mcg/inh inhalation aerosol: 2 inhaled prn  metoprolol tartrate 100 mg oral tablet: 1 tab(s) orally 2 times a day  valsartan 160 mg oral tablet: 1 tab(s) orally once a day (in the morning)  Vitamin C 500 mg oral tablet: 2 tab(s) orally once a day (in the morning)

## 2025-03-26 NOTE — DISCHARGE NOTE PROVIDER - CARE PROVIDER_API CALL
Amrita Lopez  Colon/Rectal Surgery  [address], [address] [place] [address]  Phone: [phone]  Fax: [phone]  Follow Up Time:

## 2025-03-26 NOTE — DISCHARGE NOTE PROVIDER - HOSPITAL COURSE
HPI:  80 year old female with PMHx HTN, well-controlled asthma, HLD, diverticulitis  was admitted to  ED with perforated diverticulitis on 10/29/24. She underwent exploratory laparotomy , colon resection and colostomy. Pt had f/u surgical consult- scheduled for robotic assisted laparoscopic poss open-colostomy reversal-on 3/25/25    **Denies any fever, CP/SOB , abdominal pain or sick contacts        (12 Mar 2025 11:49)      Hospital course:    Patient underwent successful Robot-assisted laparoscopic reversal of colostomy, ELAN without complication, was sent to PACU then to the floor for monitoring.      Over the next few days patient was continued on antibiotics, given pain control. Diet was advanced appropriately until return of normal GI function was obtained as evidence by passing of flatus and BM in addition to toleration of diet. Lab values were monitored. Lennon catheter was d/c'd on POD#___    Disposition: Patient to follow-up with Dr. Lopez as outpatient in 1 week.           HPI:  80 year old female with PMHx HTN, well-controlled asthma, HLD, diverticulitis  was admitted to  ED with perforated diverticulitis on 10/29/24. She underwent exploratory laparotomy , colon resection and colostomy. Pt had f/u surgical consult- scheduled for robotic assisted laparoscopic poss open-colostomy reversal-on 3/25/25    Hospital course:  Patient underwent successful Robot-assisted laparoscopic reversal of colostomy, ELAN without complication, was sent to PACU then to the floor for monitoring.    Over the next few days patient was continued on antibiotics, given pain control. Diet was advanced appropriately until return of normal GI function was obtained as evidence by passing of flatus and BM in addition to toleration of diet. Lab values were monitored. Lennon catheter was d/c'd on POD#1, replaced for LAURI/low UOP, again removed on POD #3, voiding appropriately after removal.     Disposition: Patient to follow-up with Dr. Lopez as outpatient in 1 week.

## 2025-03-26 NOTE — DISCHARGE NOTE PROVIDER - NSDCCPTREATMENT_GEN_ALL_CORE_FT
PRINCIPAL PROCEDURE  Procedure: Robot-assisted laparoscopic revision or reversal of colostomy or ileostomy  Findings and Treatment:

## 2025-03-26 NOTE — DISCHARGE NOTE PROVIDER - NSDCFUSCHEDAPPT_GEN_ALL_CORE_FT
Juanpablo Shepard  [facility] Physician [facility]  CARDIOLOGY [address] [facility]  Scheduled Appointment: 06/06/2025

## 2025-03-26 NOTE — PHYSICAL THERAPY INITIAL EVALUATION ADULT - ADDITIONAL COMMENTS
Patient lives in private home, +QUIQUE and stairs to bedroom. Patient was independent in all ADLs and ambulated independently without device.

## 2025-03-26 NOTE — CARE COORDINATION ASSESSMENT. - NSCAREPROVIDERS_GEN_ALL_CORE_FT
CARE PROVIDERS:  Administration: Saadia Fine  Admitting: Amrita Lopez  Attending: Amrita Lopez  Consultant: Miles Rangel  Consultant: Joana Arambula  Consultant: Maria De Jesus Caldera  Consultant: Kirt Morales  Consultant: Bryn Arteaga  Nurse: Mirlande Thompson  Nurse: Rocky Arevalo  Nurse: Yusuf Townsend  Nurse: Radha Alonso  Ordered: Doctor, Unknown  Ordered: Racquel Chamorro  Ordered: ADM, User  Outpatient Provider: Juanpablo Shepard  Override: Ban Ace  Override: Germaine Soliman  Override: Amanda Dwyer  Override: Rocky Arevalo  Override: Nicole Ly  Physical Therapy: Anthony Root  Primary Team: Pedro Pablo Bird  Registered Dietitian: Sanam Hakw  Respiratory Therapy: Geo Lake  Respiratory Therapy: Pranav Soliman  Respiratory Therapy: Danyell Cornell  Respiratory Therapy: Ramandeep Hatch

## 2025-03-27 LAB
ANION GAP SERPL CALC-SCNC: 6 MMOL/L — SIGNIFICANT CHANGE UP (ref 5–17)
BUN SERPL-MCNC: 30 MG/DL — HIGH (ref 7–23)
CALCIUM SERPL-MCNC: 7.9 MG/DL — LOW (ref 8.5–10.1)
CHLORIDE SERPL-SCNC: 106 MMOL/L — SIGNIFICANT CHANGE UP (ref 96–108)
CO2 SERPL-SCNC: 25 MMOL/L — SIGNIFICANT CHANGE UP (ref 22–31)
CREAT SERPL-MCNC: 1.4 MG/DL — HIGH (ref 0.5–1.3)
EGFR: 38 ML/MIN/1.73M2 — LOW
EGFR: 38 ML/MIN/1.73M2 — LOW
GLUCOSE SERPL-MCNC: 105 MG/DL — HIGH (ref 70–99)
HCT VFR BLD CALC: 25.6 % — LOW (ref 34.5–45)
HGB BLD-MCNC: 8.2 G/DL — LOW (ref 11.5–15.5)
MAGNESIUM SERPL-MCNC: 1.7 MG/DL — SIGNIFICANT CHANGE UP (ref 1.6–2.6)
MCHC RBC-ENTMCNC: 29.4 PG — SIGNIFICANT CHANGE UP (ref 27–34)
MCHC RBC-ENTMCNC: 32 G/DL — SIGNIFICANT CHANGE UP (ref 32–36)
MCV RBC AUTO: 91.8 FL — SIGNIFICANT CHANGE UP (ref 80–100)
NRBC BLD AUTO-RTO: 0 /100 WBCS — SIGNIFICANT CHANGE UP (ref 0–0)
PHOSPHATE SERPL-MCNC: 2 MG/DL — LOW (ref 2.5–4.5)
PLATELET # BLD AUTO: 207 K/UL — SIGNIFICANT CHANGE UP (ref 150–400)
POTASSIUM SERPL-MCNC: 3.7 MMOL/L — SIGNIFICANT CHANGE UP (ref 3.5–5.3)
POTASSIUM SERPL-SCNC: 3.7 MMOL/L — SIGNIFICANT CHANGE UP (ref 3.5–5.3)
RBC # BLD: 2.79 M/UL — LOW (ref 3.8–5.2)
RBC # FLD: 14.1 % — SIGNIFICANT CHANGE UP (ref 10.3–14.5)
SODIUM SERPL-SCNC: 137 MMOL/L — SIGNIFICANT CHANGE UP (ref 135–145)
WBC # BLD: 12.47 K/UL — HIGH (ref 3.8–10.5)
WBC # FLD AUTO: 12.47 K/UL — HIGH (ref 3.8–10.5)

## 2025-03-27 RX ORDER — SOD PHOS DI, MONO/K PHOS MONO 250 MG
1 TABLET ORAL ONCE
Refills: 0 | Status: COMPLETED | OUTPATIENT
Start: 2025-03-27 | End: 2025-03-27

## 2025-03-27 RX ORDER — CYCLOBENZAPRINE HYDROCHLORIDE 15 MG/1
5 CAPSULE, EXTENDED RELEASE ORAL THREE TIMES A DAY
Refills: 0 | Status: DISCONTINUED | OUTPATIENT
Start: 2025-03-27 | End: 2025-03-28

## 2025-03-27 RX ORDER — ALBUTEROL SULFATE 2.5 MG/3ML
2 VIAL, NEBULIZER (ML) INHALATION EVERY 6 HOURS
Refills: 0 | Status: DISCONTINUED | OUTPATIENT
Start: 2025-03-27 | End: 2025-03-28

## 2025-03-27 RX ADMIN — Medication 1 PACKET(S): at 11:44

## 2025-03-27 RX ADMIN — Medication 1000 MILLIGRAM(S): at 22:41

## 2025-03-27 RX ADMIN — LIDOCAINE HYDROCHLORIDE 1 PATCH: 20 JELLY TOPICAL at 11:53

## 2025-03-27 RX ADMIN — Medication 1000 MILLIGRAM(S): at 08:54

## 2025-03-27 RX ADMIN — ATORVASTATIN CALCIUM 10 MILLIGRAM(S): 80 TABLET, FILM COATED ORAL at 11:43

## 2025-03-27 RX ADMIN — OXYCODONE HYDROCHLORIDE 5 MILLIGRAM(S): 30 TABLET ORAL at 14:51

## 2025-03-27 RX ADMIN — HEPARIN SODIUM 5000 UNIT(S): 1000 INJECTION INTRAVENOUS; SUBCUTANEOUS at 21:42

## 2025-03-27 RX ADMIN — HEPARIN SODIUM 5000 UNIT(S): 1000 INJECTION INTRAVENOUS; SUBCUTANEOUS at 13:19

## 2025-03-27 RX ADMIN — HEPARIN SODIUM 5000 UNIT(S): 1000 INJECTION INTRAVENOUS; SUBCUTANEOUS at 05:29

## 2025-03-27 RX ADMIN — OXYCODONE HYDROCHLORIDE 5 MILLIGRAM(S): 30 TABLET ORAL at 13:51

## 2025-03-27 RX ADMIN — Medication 1000 MILLIGRAM(S): at 13:23

## 2025-03-27 RX ADMIN — Medication 400 MILLIGRAM(S): at 08:28

## 2025-03-27 RX ADMIN — Medication 1000 MILLIGRAM(S): at 13:30

## 2025-03-27 RX ADMIN — LIDOCAINE HYDROCHLORIDE 1 PATCH: 20 JELLY TOPICAL at 23:00

## 2025-03-27 RX ADMIN — Medication 75 MILLILITER(S): at 10:11

## 2025-03-27 RX ADMIN — Medication 400 MILLIGRAM(S): at 03:04

## 2025-03-27 RX ADMIN — FENOFIBRATE 145 MILLIGRAM(S): 160 TABLET ORAL at 11:43

## 2025-03-27 RX ADMIN — Medication 2 PUFF(S): at 13:19

## 2025-03-27 RX ADMIN — METOPROLOL SUCCINATE 100 MILLIGRAM(S): 50 TABLET, EXTENDED RELEASE ORAL at 17:45

## 2025-03-27 RX ADMIN — LIDOCAINE HYDROCHLORIDE 1 PATCH: 20 JELLY TOPICAL at 19:00

## 2025-03-27 RX ADMIN — Medication 4 MILLIGRAM(S): at 21:42

## 2025-03-27 RX ADMIN — LIDOCAINE HYDROCHLORIDE 1 PATCH: 20 JELLY TOPICAL at 02:00

## 2025-03-27 RX ADMIN — Medication 1000 MILLIGRAM(S): at 21:42

## 2025-03-27 NOTE — PATIENT PROFILE ADULT - FALL HARM RISK - UNIVERSAL INTERVENTIONS
Bed in lowest position, wheels locked, appropriate side rails in place/Call bell, personal items and telephone in reach/Instruct patient to call for assistance before getting out of bed or chair/Non-slip footwear when patient is out of bed/[place] to call system/Physically safe environment - no spills, clutter or unnecessary equipment/Purposeful Proactive Rounding/Room/bathroom lighting operational, light cord in reach

## 2025-03-27 NOTE — CASE MANAGEMENT PROGRESS NOTE - NSCMPROGRESSNOTE_GEN_ALL_CORE
Discussed pt on rounds, pt remains acute, on full liq diet, pt co diarrhea, jacome, IVF. CM will continue to collaborate with interdisciplinary team and remain available to assist.

## 2025-03-27 NOTE — PROGRESS NOTE ADULT - ATTENDING COMMENTS
SE  Pain at colostomy closure site.  Camila solid food and passing stools during my visit.  AFVSS  NAD  soft, approp tender, mild distention  Labs reviewed  Cr normalizing  A/P -   Advance to low fiber diet.  Cont low rate IVF.  D/C jacome in AM for trial of void.  Hopefully home tomorrow.

## 2025-03-27 NOTE — PATIENT CHOICE NOTE. - NSPTCHOICESTATE_GEN_ALL_CORE
I have met with the patient and/or caregiver to discuss discharge goals and treatment plan. Patient and/or caregiver also provided with instructions on accessing the CMS Compare websites for additional information related to Post Acute Provider quality and resource use measures to assist them in evaluation of the providers and in selecting their post-acute provider of choice. Patient and caregiver were informed of the facilities that are owned and/or operated by [facility]. I have discussed with the patient the availability of in-network facilities and providers. Patient and caregiver provided with a list of post-acute providers whose services are appropriate to the discharge plans and patient needs.     For patient requiring durable medical equipment, patient and/or caregiver were informed that they have the right to request who provides the required equipment.
I have met with the patient and/or caregiver to discuss discharge goals and treatment plan. Patient and/or caregiver also provided with instructions on accessing the CMS Compare websites for additional information related to Post Acute Provider quality and resource use measures to assist them in evaluation of the providers and in selecting their post-acute provider of choice. Patient and caregiver were informed of the facilities that are owned and/or operated by [facility]. I have discussed with the patient the availability of in-network facilities and providers. Patient and caregiver provided with a list of post-acute providers whose services are appropriate to the discharge plans and patient needs.     For patient requiring durable medical equipment, patient and/or caregiver were informed that they have the right to request who provides the required equipment.

## 2025-03-27 NOTE — PATIENT PROFILE ADULT - NSPROSPHOSPCHAPLAINYN_GEN_A_NUR
"Subjective   Star Rollins is a 29 y.o. female. Annual exam and refills    History of Present Illness   Mom had lap band, she is thinking about sleeve. Has watched a meeting about bariatrics. Has gained weight during COVID  Average weight in last 10 yrs 240-250. Has dieted in the past phentermine made heart race. Stopped drinking sodas. Joined gym with mom and helped some, but plateaus.  Did weight watchers years ago, but too picky an eater. Has never tried vegetarian, keto, mediterranean diet.   Typically eats 1 big meal daily. Pop tart or bowl of cereal, banana, apples grapes. Meals with pasta macaroni, spaghetti. Chicken pork, hamburger, pizza, corn peas.   Has apple watch--does not track, but  JCPS--on feet all day  Anxiety or ADD--worse with Grandma passing away--lived with her. Not sleeping well. Constant thinking. Had 2 panic attacks last week.  Taking propranolol prn. Thinks ADD triggering anxiety. Previous buspar and did not do well.   The following portions of the patient's history were reviewed and updated as appropriate: allergies, current medications, past family history, past medical history, past social history, past surgical history and problem list.    Review of Systems   Constitutional: Positive for activity change and appetite change. Negative for fatigue, unexpected weight gain and unexpected weight loss.   HENT: Negative.    Respiratory: Negative for shortness of breath.    Cardiovascular: Negative for chest pain and palpitations.   Musculoskeletal: Positive for arthralgias.   Neurological: Negative for dizziness.   Hematological: Negative.    Psychiatric/Behavioral: Negative for behavioral problems, decreased concentration, dysphoric mood and sleep disturbance. The patient is not nervous/anxious.      /74   Pulse 65   Ht 157.5 cm (62.01\")   Wt 119 kg (263 lb)   SpO2 100%   BMI 48.09 kg/m²     Objective   Physical Exam  Vitals and nursing note reviewed. "   Constitutional:       Appearance: She is well-developed. She is not diaphoretic.   HENT:      Head: Normocephalic and atraumatic.   Neck:      Thyroid: No thyromegaly.   Cardiovascular:      Rate and Rhythm: Normal rate and regular rhythm.      Pulses:           Carotid pulses are 2+ on the right side and 2+ on the left side.     Heart sounds: Normal heart sounds.   Pulmonary:      Effort: Pulmonary effort is normal.      Breath sounds: Normal breath sounds.   Musculoskeletal:      Cervical back: Normal range of motion and neck supple.   Lymphadenopathy:      Cervical: No cervical adenopathy.   Psychiatric:         Behavior: Behavior normal.         Thought Content: Thought content normal.         Judgment: Judgment normal.       Assessment/Plan   Problems Addressed this Visit        Endocrine and Metabolic    Obesity - Primary    Relevant Orders    Ambulatory Referral to Bariatric Surgery      Other Visit Diagnoses     Anxiety        Relevant Medications    buPROPion XL (Wellbutrin XL) 150 MG 24 hr tablet    propranolol (INDERAL) 20 MG tablet    Papilledema          Diagnoses       Codes Comments    Class 2 severe obesity due to excess calories with serious comorbidity and body mass index (BMI) of 38.0 to 38.9 in adult ([facility])    -  Primary ICD-10-CM: E66.01, Z68.38  ICD-9-CM: 278.01, V85.38     Anxiety     ICD-10-CM: F41.9  ICD-9-CM: 300.00     Papilledema     ICD-10-CM: H47.10  ICD-9-CM: 377.00         Obesity--Would look into intermittent fasting and increasing protein intake as works with lifestyle.   Track steps at work, goal 80698/day    We talked about medications, she is going to think about it. Has worries of side effects with medication. We specifically talked about risk and benefit of GLP1 in weight loss.     Start medical weight loss. FU 1 month    Anxiety--add daily wellbutrin to prn propranolol    Papilledema--continue with ophthalmology    This document is intended for medical expert use only.  Reading of this document by patients and/or patient's family without participating medical staff guidance may result in misinterpretation and unintended morbidity.  Any interpretation of such data is the responsibility of the patient and/or family member responsible for the patient in concert with their primary or specialist providers, not to be left for sources of online searches such as [employer], [employer] or similar queries. Relying on these approaches to knowledge may result in misinterpretation, misguided goals of care and even death should patients or family members try recommendations outside of the realm of professional medical care in a supervised way.    Please allow 3-5 business days for recommendations based on new results    Go to the ER for any possible lifethreatening symptoms such as chest pain or shortness of air.     I personally spent 27minutes reviewing the chart before the visit, time with the patient, and time documenting the visit.              no

## 2025-03-27 NOTE — CHART NOTE - NSCHARTNOTEFT_GEN_A_CORE
Notified by nurse that patient was c/o chest tightness. Pt seen and examined at bedside. In no acute distress, VSS, resting comfortably in bed, no accessory muscle use, jaw pain, arm pain, diaphoresis. Lungs CTABL, RRR. Pt describes feeling her usual asthma symptoms. Order placed for PRN inhaler.

## 2025-03-27 NOTE — PROGRESS NOTE ADULT - ASSESSMENT
81y/o Female PMHx HTN, well-controlled asthma, HLD, diverticulitis s/p Campa's prcedure, now POD #2 S/P robotic assisted laparoscopic colostomy reversal with ELAN.    Plan:  - ADAT  - IS/OOBA  - maintain jacome for now  - F/U AM labs   - pain management PRN  - To be discussed with Dr. Lopez

## 2025-03-28 ENCOUNTER — TRANSCRIPTION ENCOUNTER (OUTPATIENT)
Age: 80
End: 2025-03-28

## 2025-03-28 VITALS
RESPIRATION RATE: 17 BRPM | HEART RATE: 84 BPM | OXYGEN SATURATION: 95 % | DIASTOLIC BLOOD PRESSURE: 76 MMHG | TEMPERATURE: 99 F | SYSTOLIC BLOOD PRESSURE: 128 MMHG

## 2025-03-28 LAB
ANION GAP SERPL CALC-SCNC: 7 MMOL/L — SIGNIFICANT CHANGE UP (ref 5–17)
BUN SERPL-MCNC: 26 MG/DL — HIGH (ref 7–23)
CALCIUM SERPL-MCNC: 8.1 MG/DL — LOW (ref 8.5–10.1)
CHLORIDE SERPL-SCNC: 108 MMOL/L — SIGNIFICANT CHANGE UP (ref 96–108)
CO2 SERPL-SCNC: 24 MMOL/L — SIGNIFICANT CHANGE UP (ref 22–31)
CREAT SERPL-MCNC: 0.92 MG/DL — SIGNIFICANT CHANGE UP (ref 0.5–1.3)
EGFR: 63 ML/MIN/1.73M2 — SIGNIFICANT CHANGE UP
EGFR: 63 ML/MIN/1.73M2 — SIGNIFICANT CHANGE UP
GLUCOSE SERPL-MCNC: 109 MG/DL — HIGH (ref 70–99)
HCT VFR BLD CALC: 24.9 % — LOW (ref 34.5–45)
HGB BLD-MCNC: 8 G/DL — LOW (ref 11.5–15.5)
MAGNESIUM SERPL-MCNC: 1.8 MG/DL — SIGNIFICANT CHANGE UP (ref 1.6–2.6)
MCHC RBC-ENTMCNC: 29.7 PG — SIGNIFICANT CHANGE UP (ref 27–34)
MCHC RBC-ENTMCNC: 32.1 G/DL — SIGNIFICANT CHANGE UP (ref 32–36)
MCV RBC AUTO: 92.6 FL — SIGNIFICANT CHANGE UP (ref 80–100)
NRBC BLD AUTO-RTO: 0 /100 WBCS — SIGNIFICANT CHANGE UP (ref 0–0)
PHOSPHATE SERPL-MCNC: 1.5 MG/DL — LOW (ref 2.5–4.5)
PLATELET # BLD AUTO: 219 K/UL — SIGNIFICANT CHANGE UP (ref 150–400)
POTASSIUM SERPL-MCNC: 3.6 MMOL/L — SIGNIFICANT CHANGE UP (ref 3.5–5.3)
POTASSIUM SERPL-SCNC: 3.6 MMOL/L — SIGNIFICANT CHANGE UP (ref 3.5–5.3)
RBC # BLD: 2.69 M/UL — LOW (ref 3.8–5.2)
RBC # FLD: 14.2 % — SIGNIFICANT CHANGE UP (ref 10.3–14.5)
SODIUM SERPL-SCNC: 139 MMOL/L — SIGNIFICANT CHANGE UP (ref 135–145)
WBC # BLD: 11.97 K/UL — HIGH (ref 3.8–10.5)
WBC # FLD AUTO: 11.97 K/UL — HIGH (ref 3.8–10.5)

## 2025-03-28 PROCEDURE — 97162 PT EVAL MOD COMPLEX 30 MIN: CPT

## 2025-03-28 PROCEDURE — C1889: CPT

## 2025-03-28 PROCEDURE — S2900: CPT

## 2025-03-28 PROCEDURE — 80048 BASIC METABOLIC PNL TOTAL CA: CPT

## 2025-03-28 PROCEDURE — 97530 THERAPEUTIC ACTIVITIES: CPT

## 2025-03-28 PROCEDURE — C9399: CPT

## 2025-03-28 PROCEDURE — 88304 TISSUE EXAM BY PATHOLOGIST: CPT

## 2025-03-28 PROCEDURE — 94640 AIRWAY INHALATION TREATMENT: CPT

## 2025-03-28 PROCEDURE — 83735 ASSAY OF MAGNESIUM: CPT

## 2025-03-28 PROCEDURE — 97116 GAIT TRAINING THERAPY: CPT

## 2025-03-28 PROCEDURE — 85027 COMPLETE CBC AUTOMATED: CPT

## 2025-03-28 PROCEDURE — 36415 COLL VENOUS BLD VENIPUNCTURE: CPT

## 2025-03-28 PROCEDURE — 84100 ASSAY OF PHOSPHORUS: CPT

## 2025-03-28 RX ORDER — POTASSIUM PHOSPHATE, MONOBASIC POTASSIUM PHOSPHATE, DIBASIC INJECTION, 236; 224 MG/ML; MG/ML
30 SOLUTION, CONCENTRATE INTRAVENOUS ONCE
Refills: 0 | Status: COMPLETED | OUTPATIENT
Start: 2025-03-28 | End: 2025-03-28

## 2025-03-28 RX ORDER — IBUPROFEN 200 MG
600 TABLET ORAL EVERY 6 HOURS
Refills: 0 | Status: DISCONTINUED | OUTPATIENT
Start: 2025-03-28 | End: 2025-03-28

## 2025-03-28 RX ORDER — KETOROLAC TROMETHAMINE 30 MG/ML
15 INJECTION, SOLUTION INTRAMUSCULAR; INTRAVENOUS ONCE
Refills: 0 | Status: DISCONTINUED | OUTPATIENT
Start: 2025-03-28 | End: 2025-03-28

## 2025-03-28 RX ORDER — SOD PHOS DI, MONO/K PHOS MONO 250 MG
1 TABLET ORAL ONCE
Refills: 0 | Status: COMPLETED | OUTPATIENT
Start: 2025-03-28 | End: 2025-03-28

## 2025-03-28 RX ORDER — OXYCODONE HYDROCHLORIDE 30 MG/1
1 TABLET ORAL
Qty: 8 | Refills: 0
Start: 2025-03-28

## 2025-03-28 RX ORDER — ACETAMINOPHEN 500 MG/5ML
2 LIQUID (ML) ORAL
Qty: 20 | Refills: 0
Start: 2025-03-28

## 2025-03-28 RX ORDER — IBUPROFEN 200 MG
1 TABLET ORAL
Qty: 10 | Refills: 0
Start: 2025-03-28

## 2025-03-28 RX ADMIN — Medication 160 MILLIGRAM(S): at 06:14

## 2025-03-28 RX ADMIN — FENOFIBRATE 145 MILLIGRAM(S): 160 TABLET ORAL at 13:20

## 2025-03-28 RX ADMIN — Medication 1000 MILLIGRAM(S): at 03:17

## 2025-03-28 RX ADMIN — POTASSIUM PHOSPHATE, MONOBASIC POTASSIUM PHOSPHATE, DIBASIC INJECTION, 83.33 MILLIMOLE(S): 236; 224 SOLUTION, CONCENTRATE INTRAVENOUS at 09:36

## 2025-03-28 RX ADMIN — Medication 1 PACKET(S): at 13:20

## 2025-03-28 RX ADMIN — Medication 1000 MILLIGRAM(S): at 10:36

## 2025-03-28 RX ADMIN — ATORVASTATIN CALCIUM 10 MILLIGRAM(S): 80 TABLET, FILM COATED ORAL at 13:20

## 2025-03-28 RX ADMIN — HEPARIN SODIUM 5000 UNIT(S): 1000 INJECTION INTRAVENOUS; SUBCUTANEOUS at 06:13

## 2025-03-28 RX ADMIN — HEPARIN SODIUM 5000 UNIT(S): 1000 INJECTION INTRAVENOUS; SUBCUTANEOUS at 13:21

## 2025-03-28 RX ADMIN — KETOROLAC TROMETHAMINE 15 MILLIGRAM(S): 30 INJECTION, SOLUTION INTRAMUSCULAR; INTRAVENOUS at 14:19

## 2025-03-28 RX ADMIN — KETOROLAC TROMETHAMINE 15 MILLIGRAM(S): 30 INJECTION, SOLUTION INTRAMUSCULAR; INTRAVENOUS at 13:19

## 2025-03-28 RX ADMIN — Medication 1000 MILLIGRAM(S): at 09:36

## 2025-03-28 RX ADMIN — Medication 1000 MILLIGRAM(S): at 04:17

## 2025-03-28 RX ADMIN — LIDOCAINE HYDROCHLORIDE 1 PATCH: 20 JELLY TOPICAL at 13:20

## 2025-03-28 RX ADMIN — METOPROLOL SUCCINATE 100 MILLIGRAM(S): 50 TABLET, EXTENDED RELEASE ORAL at 06:14

## 2025-03-28 NOTE — PROGRESS NOTE ADULT - SUBJECTIVE AND OBJECTIVE BOX
POD #2 s/p robot assisted lap reversal of colostomy   LUCY PARTIDA    SUBJECTIVE:  Patient seen and examined at bedside this morning, denies any acute complaints, reports that she has been having uncontrolled dairrhea and she has been having a lot of pain with walking, working with PT. Denies any nausea, vomiting, CP SOB, fever or chills. Tolerating FLD.    MEDICATIONS  (STANDING):  acetaminophen     Tablet .. 1000 milliGRAM(s) Oral every 6 hours  acetaminophen   IVPB .. 1000 milliGRAM(s) IV Intermittent once  atorvastatin 10 milliGRAM(s) Oral daily  fenofibrate Tablet 145 milliGRAM(s) Oral every 24 hours  heparin   Injectable 5000 Unit(s) SubCutaneous every 8 hours  lidocaine   4% Patch 1 Patch Transdermal daily  metoprolol tartrate 100 milliGRAM(s) Oral two times a day  sodium chloride 0.9%. 1000 milliLiter(s) (100 mL/Hr) IV Continuous <Continuous>    MEDICATIONS  (PRN):  HYDROmorphone  Injectable 0.5 milliGRAM(s) IV Push once PRN Breakthrough  ondansetron Injectable 4 milliGRAM(s) IV Push every 6 hours PRN Nausea  oxyCODONE    IR 2.5 milliGRAM(s) Oral every 4 hours PRN Moderate Pain (4 - 6)  oxyCODONE    IR 5 milliGRAM(s) Oral every 4 hours PRN Severe Pain (7 - 10)      Vital Signs Last 24 Hrs  T(C): 36.8 (27 Mar 2025 05:11), Max: 37.1 (26 Mar 2025 19:56)  T(F): 98.2 (27 Mar 2025 05:11), Max: 98.7 (26 Mar 2025 19:56)  HR: 87 (27 Mar 2025 05:11) (78 - 91)  BP: 110/66 (27 Mar 2025 05:11) (100/59 - 121/66)  BP(mean): --  RR: 18 (27 Mar 2025 05:11) (17 - 20)  SpO2: 96% (27 Mar 2025 05:11) (92% - 99%)    Parameters below as of 27 Mar 2025 05:11  Patient On (Oxygen Delivery Method): room air        PHYSICAL EXAM:  Constitutional: AAOx3, no acute distress  HEENT: NCAT, airway patent  Cardiovascular: RRR, pulses present bilaterally  Respiratory: nonlabored breathing  Gastrointestinal: abdomen soft, nontender, non distended, no rebound or guarding, no palpable masses, incisions are clean, dry and intact without any surrounding erythema, drainage, bleeding or signs of infection   Neuro: no focal deficits  Extremities: non edematous, no calf pain bilaterally       I&O's Detail    26 Mar 2025 07:01  -  27 Mar 2025 07:00  --------------------------------------------------------  IN:    IV PiggyBack: 100 mL    sodium chloride 0.9%: 2600 mL  Total IN: 2700 mL    OUT:    Indwelling Catheter - Urethral (mL): 1000 mL    Voided (mL): 200 mL  Total OUT: 1200 mL    Total NET: 1500 mL          LABS:  [pending]
POD#1 s/p Robot-assisted laparoscopic reversal of colostomy, ELAN    S: Patient seen and examined at bedside.  No overnight events.  Patient reports continued abdominal pain at this time, improved with pain regimen. + jacome. Patient denies any fever, chills, chest pain, shortness of breath, nausea, vomiting, or urinary complaints.    MEDICATIONS:  MEDICATIONS  (STANDING):  acetaminophen     Tablet .. 1000 milliGRAM(s) Oral every 6 hours  acetaminophen   IVPB .. 1000 milliGRAM(s) IV Intermittent every 6 hours  atorvastatin 10 milliGRAM(s) Oral daily  fenofibrate Tablet 145 milliGRAM(s) Oral every 24 hours  lactated ringers. 1000 milliLiter(s) (55 mL/Hr) IV Continuous <Continuous>  metoprolol tartrate 100 milliGRAM(s) Oral two times a day  valsartan 160 milliGRAM(s) Oral daily    MEDICATIONS  (PRN):  HYDROmorphone  Injectable 0.5 milliGRAM(s) IV Push once PRN Breakthrough  ondansetron Injectable 4 milliGRAM(s) IV Push every 6 hours PRN Nausea  oxyCODONE    IR 2.5 milliGRAM(s) Oral every 4 hours PRN Moderate Pain (4 - 6)  oxyCODONE    IR 5 milliGRAM(s) Oral every 4 hours PRN Severe Pain (7 - 10)      O:  VITAL SIGNS:  Vital Signs Last 24 Hrs  T(C): 36.8 (26 Mar 2025 05:23), Max: 36.9 (26 Mar 2025 00:04)  T(F): 98.2 (26 Mar 2025 05:23), Max: 98.5 (26 Mar 2025 00:04)  HR: 76 (26 Mar 2025 05:23) (60 - 76)  BP: 94/52 (26 Mar 2025 05:23) (94/52 - 138/70)  RR: 18 (26 Mar 2025 05:23) (14 - 20)  SpO2: 94% (26 Mar 2025 05:36) (94% - 100%)    Parameters below as of 26 Mar 2025 05:36  Patient On (Oxygen Delivery Method): room air        PHYSICAL EXAM:  GENERAL: No acute distress, lying comfortably in bed  HEAD:  Atraumatic, Normocephalic  CHEST/LUNG: Non labored respirations, no accessory muscle use  HEART: Regular rate and rhythm  ABDOMEN: Soft, non-tender, non-distended; incisions c/d/i, old ostomy site with dressing with serosanguinous drainage  : jacome in place, draining clear yellow urine   EXT: calves non-tender b/l, no edema  NEUROLOGY: A&O x 3, no focal deficits    INTAKE & OUTPUT:  I&O's Summary    25 Mar 2025 07:01  -  26 Mar 2025 06:00  --------------------------------------------------------  IN: 500 mL / OUT: 500 mL / NET: 0 mL      I&O's Detail    25 Mar 2025 07:01  -  26 Mar 2025 06:00  --------------------------------------------------------  IN:    Oral Fluid: 500 mL  Total IN: 500 mL    OUT:    Indwelling Catheter - Urethral (mL): 500 mL  Total OUT: 500 mL    Total NET: 0 mL        A: 79y/o Female PMHx HTN, well-controlled asthma, HLD, diverticulitis s/p Campa's prcedure, now S/P robotic assisted laparoscopic colostomy reversal with ELAN.      P:  - incentive spirometer  - pain control  - OOB  - CLD, IVF. Will adv diet with return of bowel function  - F/u labs  - D/c naa this AM  - Pain control, supportive care    Case to be discussed with Dr. Lopez.
SUBJECTIVE:  Patient seen and examined at bedside. No overnight events.  Patient reports occasional stomach upset that she attributes to the meds she is receiving. Pt states she is eating, but she feels it is not much.  Admits to flatus and liquid BMx1 yesterday. Lennon out this AM, pending TOV.  Patient denies any fever, chills, chest pain, shortness of breath, nausea, vomiting, or urinary complaints.    VITALS  Vital Signs Last 24 Hrs  T(C): 36.8 (28 Mar 2025 06:10), Max: 36.8 (28 Mar 2025 06:10)  T(F): 98.2 (28 Mar 2025 06:10), Max: 98.2 (28 Mar 2025 06:10)  HR: 98 (28 Mar 2025 06:10) (70 - 122)  BP: 153/80 (28 Mar 2025 06:10) (107/66 - 153/80)  BP(mean): --  RR: 17 (28 Mar 2025 06:10) (17 - 19)  SpO2: 94% (28 Mar 2025 06:10) (93% - 95%)    Parameters below as of 28 Mar 2025 06:10  Patient On (Oxygen Delivery Method): room air    PHYSICAL EXAM  GENERAL:  Well-developed Female lying comfortably in bed in NAD.  HEENT:  NC/AT. Sclera white. Mucous membranes moist.  ABDOMEN:  Soft, nondistended, watson-incisional TTP; Lap incisions sites well-approximated, dermabond in place; Colostomy site w/ serosanguinous drainage, dressing replaced; No rebound tenderness or guarding.  SKIN:  No jaundice, pallor, or cyanosis  NEURO:  A&O x 3    INTAKE & OUTPUT  I&O's Summary    27 Mar 2025 07:01  -  28 Mar 2025 07:00  --------------------------------------------------------  IN: 0 mL / OUT: 2300 mL / NET: -2300 mL      I&O's Detail    27 Mar 2025 07:01  -  28 Mar 2025 07:00  --------------------------------------------------------  IN:  Total IN: 0 mL    OUT:    Indwelling Catheter - Urethral (mL): 2300 mL  Total OUT: 2300 mL    Total NET: -2300 mL          MEDICATIONS  MEDICATIONS  (STANDING):  acetaminophen     Tablet .. 1000 milliGRAM(s) Oral every 6 hours  atorvastatin 10 milliGRAM(s) Oral daily  fenofibrate Tablet 145 milliGRAM(s) Oral every 24 hours  heparin   Injectable 5000 Unit(s) SubCutaneous every 8 hours  hydrochlorothiazide 25 milliGRAM(s) Oral daily  ibuprofen  Tablet. 600 milliGRAM(s) Oral every 6 hours  lidocaine   4% Patch 1 Patch Transdermal daily  metoprolol tartrate 100 milliGRAM(s) Oral two times a day  potassium phosphate IVPB 30 milliMole(s) IV Intermittent once  valsartan 160 milliGRAM(s) Oral daily    MEDICATIONS  (PRN):  albuterol    90 MICROgram(s) HFA Inhaler 2 Puff(s) Inhalation every 6 hours PRN Bronchospasm  cyclobenzaprine 5 milliGRAM(s) Oral three times a day PRN Muscle Spasm  fluticasone propionate/ salmeterol 100-50 MICROgram(s) Diskus 1 Dose(s) Inhalation two times a day PRN SOB  HYDROmorphone  Injectable 0.5 milliGRAM(s) IV Push once PRN Breakthrough  ondansetron Injectable 4 milliGRAM(s) IV Push every 6 hours PRN Nausea  oxyCODONE    IR 2.5 milliGRAM(s) Oral every 4 hours PRN Moderate Pain (4 - 6)  oxyCODONE    IR 5 milliGRAM(s) Oral every 4 hours PRN Severe Pain (7 - 10)      LABS:                        8.0    11.97 )-----------( 219      ( 28 Mar 2025 05:25 )             24.9     03-28    139  |  108  |  26[H]  ----------------------------<  109[H]  3.6   |  24  |  0.92    Ca    8.1[L]      28 Mar 2025 05:25  Phos  1.5     03-28  Mg     1.8     03-28    ASSESSMENT & PLAN  79y/o Female PMHx HTN, well-controlled asthma, HLD, diverticulitis s/p Campa's prcedure, now POD #3 S/P robotic assisted laparoscopic colostomy reversal with ELAN. Tolerating regular diet. Lennon removed this AM. Pt w/ occasional abdominal pain, endorses adequate pain control.  AFVSS. Labs reviewed with improving renal indices. Hypophosphatemic.    Plan:  - ADAT  - IS/OOBA  - PT  - Lennon removed; TOV f/u  - pain management PRN  - To be discussed with Dr. Lopez  - DC planning pending successful TOV and diet tolerance
Post Operative Note    Procedure: S/P Robot-assisted laparoscopic reversal of colostomy, ELAN    Subjective:   Patient seen and examined at bedside.  No events post-operatively.  Patient with no new complaints at this time besides post operative pain worse with moving, tolerating diet, denies flatus and bowel movement. +jacome. Patient denies any fevers, chills, chest pain, shortness of breath,  nausea, vomiting or diarrhea.    Medications: [Standing]  acetaminophen   IVPB .. 1000 milliGRAM(s) IV Intermittent every 6 hours  cefoTEtan  IVPB 2 Gram(s) IV Intermittent every 12 hours  HYDROmorphone  Injectable 0.5 milliGRAM(s) IV Push once PRN  lactated ringers. 1000 milliLiter(s) IV Continuous <Continuous>  metoprolol tartrate 100 milliGRAM(s) Oral two times a day  ondansetron Injectable 4 milliGRAM(s) IV Push every 6 hours PRN  oxyCODONE    IR 2.5 milliGRAM(s) Oral every 4 hours PRN  oxyCODONE    IR 5 milliGRAM(s) Oral every 4 hours PRN    Medications: [PRN]  acetaminophen   IVPB .. 1000 milliGRAM(s) IV Intermittent every 6 hours  cefoTEtan  IVPB 2 Gram(s) IV Intermittent every 12 hours  HYDROmorphone  Injectable 0.5 milliGRAM(s) IV Push once PRN  lactated ringers. 1000 milliLiter(s) IV Continuous <Continuous>  metoprolol tartrate 100 milliGRAM(s) Oral two times a day  ondansetron Injectable 4 milliGRAM(s) IV Push every 6 hours PRN  oxyCODONE    IR 2.5 milliGRAM(s) Oral every 4 hours PRN  oxyCODONE    IR 5 milliGRAM(s) Oral every 4 hours PRN      Objective:  Vitals: T(F): 97.4 (03-25-25 @ 19:56), Max: 97.7 (03-25-25 @ 14:47)  HR: 70 (03-25-25 @ 19:56)  BP: 138/70 (03-25-25 @ 19:56) (98/55 - 138/70)  RR: 18 (03-25-25 @ 19:56)  SpO2: 100% (03-25-25 @ 19:56)  Vent Settings:     In:   IV Fluids: lactated ringers. 1000 milliLiter(s) (55 mL/Hr) IV Continuous <Continuous>    Out:   EBL:   Voided Urine:   Jacome Catheter: yes     PHYSICAL EXAM:  GENERAL: No acute distress, well-developed  HEAD:  Atraumatic, Normocephalic  CHEST/LUNG: Non-labored respirations, no accessory muscle use   HEART: Regular rate and rhythm  ABDOMEN: Soft, appropriately-tender, minimally-distended; incisions clean dry intact, LLQ site of ostomy dressing with SS drainage.  : Jacome catheter in place, draining clear yellow urine  NEUROLOGY: answering questions appropriately      Imaging:  No post-op imaging studies    Assessment:  81y/o Female patient PMHx HTN, well-controlled asthma, HLD, diverticulitis s/p Campa's prcedure, now S/P robotic assisted laparoscopic colostomy reversal with ELAN.    Plan:  - incentive spirometer  - pain control  - OOB  - CLD, IVF. Will adv diet with return of bowel function  - labs in am  - D/c jacome in AM    Surgical Team Spectralink: [id number]

## 2025-03-28 NOTE — CASE MANAGEMENT PROGRESS NOTE - NSCMPROGRESSNOTE_GEN_ALL_CORE
Per MD pt is medically cleared for discharge today 3/28/25. CM met with patient to discuss transition of care. Pt is to be transitioned to home with [facility] [phone]. CM explained home care services, expectations, process, insurance provisions and home safety with pt verbalizing understanding.  Pt states her daughter will be staying with her and will assist post discharge. CM confirmed with home care agency, pt case is being accepted and home care was made aware of DC plan today 3/28/25. Pt aware of plan and in agreement / verbalizes understanding. IMM discussed / given. Pt verbalized understanding. Confirmed pharmacy is [facility]. community MD is Dr. quezada. Pt stated they would make their own follow up appointments. DMEs in home include walker. Pt stated her daughter will transport pt home. All questions answered. CM discussed plan with MD and RN. CM to remain available through hospitalization.

## 2025-03-28 NOTE — DISCHARGE NOTE NURSING/CASE MANAGEMENT/SOCIAL WORK - NSDCFUADDAPPT_GEN_ALL_CORE_FT
Please call office to schedule post-operative appointment.   It is advisable to follow up with your primary care provider within the next 1 week. You have stated you will make your own follow up appointments.

## 2025-03-28 NOTE — PROGRESS NOTE ADULT - NS ATTEND AMEND GEN_ALL_CORE FT
Patient seen and examined.  I agree with the above note with following addendum: Since PA rounds this morning patient has had Lennon catheter removed and has voided.  Her appetite is still small but she denies nausea/vomiting and she is passing flatus and BM.  No fever/chills.  Labs reviewed- some hypophosphatemia and mild hypokalemia but otherwise labs appropriate.  Abdomen soft, nondistended, appropriately tender along incisions, all C/D/I - ostomy site with dressing C/d/I, packing out.  Patient is stable for discharge to home today with outpatient follow-up with Dr. Lopez for postoperative check in ~2 weeks.

## 2025-03-28 NOTE — DISCHARGE NOTE NURSING/CASE MANAGEMENT/SOCIAL WORK - PATIENT PORTAL LINK FT
You can access the FollowMyHealth Patient Portal offered by [facility] by registering at the following website: [email or web address]. By joining [employer]’s FollowMyHealth portal, you will also be able to view your health information using other applications (apps) compatible with our system.

## 2025-03-28 NOTE — DISCHARGE NOTE NURSING/CASE MANAGEMENT/SOCIAL WORK - FINANCIAL ASSISTANCE
[facility] provides services at a reduced cost to those who are determined to be eligible through [facility]’s financial assistance program. Information regarding [facility]’s financial assistance program can be found by going to [email or web address] or by calling [phone].

## 2025-03-29 ENCOUNTER — TRANSCRIPTION ENCOUNTER (OUTPATIENT)
Age: 80
End: 2025-03-29

## 2025-03-29 PROBLEM — R11.0 NAUSEA: Status: ACTIVE | Noted: 2025-03-29

## 2025-03-29 RX ORDER — ONDANSETRON 4 MG/1
4 TABLET, ORALLY DISINTEGRATING ORAL EVERY 8 HOURS
Qty: 10 | Refills: 0 | Status: ACTIVE | COMMUNITY
Start: 2025-03-29 | End: 1900-01-01

## 2025-03-30 ENCOUNTER — EMERGENCY (EMERGENCY)
Facility: HOSPITAL | Age: 80
LOS: 1 days | Discharge: HOME | End: 2025-03-30
Attending: EMERGENCY MEDICINE | Admitting: EMERGENCY MEDICINE
Payer: MEDICARE

## 2025-03-30 VITALS
HEART RATE: 92 BPM | OXYGEN SATURATION: 97 % | RESPIRATION RATE: 18 BRPM | TEMPERATURE: 98 F | DIASTOLIC BLOOD PRESSURE: 68 MMHG | SYSTOLIC BLOOD PRESSURE: 159 MMHG

## 2025-03-30 VITALS
RESPIRATION RATE: 16 BRPM | HEART RATE: 103 BPM | HEIGHT: 63 IN | TEMPERATURE: 98 F | SYSTOLIC BLOOD PRESSURE: 171 MMHG | DIASTOLIC BLOOD PRESSURE: 80 MMHG | OXYGEN SATURATION: 98 %

## 2025-03-30 DIAGNOSIS — Z98.890 OTHER SPECIFIED POSTPROCEDURAL STATES: Chronic | ICD-10-CM

## 2025-03-30 PROBLEM — Z87.19 PERSONAL HISTORY OF OTHER DISEASES OF THE DIGESTIVE SYSTEM: Chronic | Status: ACTIVE | Noted: 2025-03-12

## 2025-03-30 LAB
ALBUMIN SERPL ELPH-MCNC: 2.4 G/DL — LOW (ref 3.3–5)
ALP SERPL-CCNC: 55 U/L — SIGNIFICANT CHANGE UP (ref 40–120)
ALT FLD-CCNC: 19 U/L — SIGNIFICANT CHANGE UP (ref 12–78)
ANION GAP SERPL CALC-SCNC: 10 MMOL/L — SIGNIFICANT CHANGE UP (ref 5–17)
APTT BLD: 27.6 SEC — SIGNIFICANT CHANGE UP (ref 24.5–35.6)
AST SERPL-CCNC: 17 U/L — SIGNIFICANT CHANGE UP (ref 15–37)
BASOPHILS # BLD AUTO: 0.04 K/UL — SIGNIFICANT CHANGE UP (ref 0–0.2)
BASOPHILS NFR BLD AUTO: 0.3 % — SIGNIFICANT CHANGE UP (ref 0–2)
BILIRUB SERPL-MCNC: 0.6 MG/DL — SIGNIFICANT CHANGE UP (ref 0.2–1.2)
BUN SERPL-MCNC: 34 MG/DL — HIGH (ref 7–23)
CALCIUM SERPL-MCNC: 8.6 MG/DL — SIGNIFICANT CHANGE UP (ref 8.5–10.1)
CHLORIDE SERPL-SCNC: 100 MMOL/L — SIGNIFICANT CHANGE UP (ref 96–108)
CO2 SERPL-SCNC: 25 MMOL/L — SIGNIFICANT CHANGE UP (ref 22–31)
CREAT SERPL-MCNC: 0.95 MG/DL — SIGNIFICANT CHANGE UP (ref 0.5–1.3)
EGFR: 60 ML/MIN/1.73M2 — SIGNIFICANT CHANGE UP
EGFR: 60 ML/MIN/1.73M2 — SIGNIFICANT CHANGE UP
EOSINOPHIL # BLD AUTO: 0.12 K/UL — SIGNIFICANT CHANGE UP (ref 0–0.5)
EOSINOPHIL NFR BLD AUTO: 0.9 % — SIGNIFICANT CHANGE UP (ref 0–6)
GLUCOSE SERPL-MCNC: 119 MG/DL — HIGH (ref 70–99)
HCT VFR BLD CALC: 23.6 % — LOW (ref 34.5–45)
HGB BLD-MCNC: 7.8 G/DL — LOW (ref 11.5–15.5)
IMM GRANULOCYTES NFR BLD AUTO: 1.7 % — HIGH (ref 0–0.9)
INR BLD: 1.04 RATIO — SIGNIFICANT CHANGE UP (ref 0.85–1.16)
LIDOCAIN IGE QN: 25 U/L — SIGNIFICANT CHANGE UP (ref 13–75)
LYMPHOCYTES # BLD AUTO: 2.88 K/UL — SIGNIFICANT CHANGE UP (ref 1–3.3)
LYMPHOCYTES # BLD AUTO: 21 % — SIGNIFICANT CHANGE UP (ref 13–44)
MCHC RBC-ENTMCNC: 29.7 PG — SIGNIFICANT CHANGE UP (ref 27–34)
MCHC RBC-ENTMCNC: 33.1 G/DL — SIGNIFICANT CHANGE UP (ref 32–36)
MONOCYTES # BLD AUTO: 1.21 K/UL — HIGH (ref 0–0.9)
MONOCYTES NFR BLD AUTO: 8.8 % — SIGNIFICANT CHANGE UP (ref 2–14)
NEUTROPHILS # BLD AUTO: 9.25 K/UL — HIGH (ref 1.8–7.4)
NEUTROPHILS NFR BLD AUTO: 67.3 % — SIGNIFICANT CHANGE UP (ref 43–77)
NRBC BLD AUTO-RTO: 0 /100 WBCS — SIGNIFICANT CHANGE UP (ref 0–0)
PLATELET # BLD AUTO: 302 K/UL — SIGNIFICANT CHANGE UP (ref 150–400)
POTASSIUM SERPL-MCNC: 3.5 MMOL/L — SIGNIFICANT CHANGE UP (ref 3.5–5.3)
POTASSIUM SERPL-SCNC: 3.5 MMOL/L — SIGNIFICANT CHANGE UP (ref 3.5–5.3)
PROT SERPL-MCNC: 6.6 G/DL — SIGNIFICANT CHANGE UP (ref 6–8.3)
PROTHROM AB SERPL-ACNC: 12.3 SEC — SIGNIFICANT CHANGE UP (ref 9.9–13.4)
RBC # BLD: 2.63 M/UL — LOW (ref 3.8–5.2)
RBC # FLD: 14.2 % — SIGNIFICANT CHANGE UP (ref 10.3–14.5)
SODIUM SERPL-SCNC: 135 MMOL/L — SIGNIFICANT CHANGE UP (ref 135–145)
WBC # BLD: 13.74 K/UL — HIGH (ref 3.8–10.5)
WBC # FLD AUTO: 13.74 K/UL — HIGH (ref 3.8–10.5)

## 2025-03-30 PROCEDURE — 74177 CT ABD & PELVIS W/CONTRAST: CPT | Mod: 26

## 2025-03-30 PROCEDURE — 99285 EMERGENCY DEPT VISIT HI MDM: CPT

## 2025-03-30 PROCEDURE — 96376 TX/PRO/DX INJ SAME DRUG ADON: CPT

## 2025-03-30 PROCEDURE — 80053 COMPREHEN METABOLIC PANEL: CPT

## 2025-03-30 PROCEDURE — 86850 RBC ANTIBODY SCREEN: CPT

## 2025-03-30 PROCEDURE — 83690 ASSAY OF LIPASE: CPT

## 2025-03-30 PROCEDURE — 86900 BLOOD TYPING SEROLOGIC ABO: CPT

## 2025-03-30 PROCEDURE — 36415 COLL VENOUS BLD VENIPUNCTURE: CPT

## 2025-03-30 PROCEDURE — 96374 THER/PROPH/DIAG INJ IV PUSH: CPT | Mod: XU

## 2025-03-30 PROCEDURE — 85730 THROMBOPLASTIN TIME PARTIAL: CPT

## 2025-03-30 PROCEDURE — 99285 EMERGENCY DEPT VISIT HI MDM: CPT | Mod: 25

## 2025-03-30 PROCEDURE — 86901 BLOOD TYPING SEROLOGIC RH(D): CPT

## 2025-03-30 PROCEDURE — 93005 ELECTROCARDIOGRAM TRACING: CPT

## 2025-03-30 PROCEDURE — 85610 PROTHROMBIN TIME: CPT

## 2025-03-30 PROCEDURE — 96375 TX/PRO/DX INJ NEW DRUG ADDON: CPT

## 2025-03-30 PROCEDURE — 93010 ELECTROCARDIOGRAM REPORT: CPT

## 2025-03-30 PROCEDURE — 74177 CT ABD & PELVIS W/CONTRAST: CPT | Mod: MC

## 2025-03-30 PROCEDURE — 85025 COMPLETE CBC W/AUTO DIFF WBC: CPT

## 2025-03-30 RX ORDER — ONDANSETRON HCL/PF 4 MG/2 ML
4 VIAL (ML) INJECTION ONCE
Refills: 0 | Status: COMPLETED | OUTPATIENT
Start: 2025-03-30 | End: 2025-03-30

## 2025-03-30 RX ORDER — METOPROLOL SUCCINATE 50 MG/1
5 TABLET, EXTENDED RELEASE ORAL ONCE
Refills: 0 | Status: COMPLETED | OUTPATIENT
Start: 2025-03-30 | End: 2025-03-30

## 2025-03-30 RX ORDER — METOCLOPRAMIDE HCL 10 MG
1 TABLET ORAL
Qty: 20 | Refills: 0
Start: 2025-03-30 | End: 2025-04-03

## 2025-03-30 RX ORDER — METOCLOPRAMIDE HCL 10 MG
10 TABLET ORAL ONCE
Refills: 0 | Status: COMPLETED | OUTPATIENT
Start: 2025-03-30 | End: 2025-03-30

## 2025-03-30 RX ORDER — PIPERACILLIN-TAZO-DEXTROSE,ISO 3.375G/5
3.38 IV SOLUTION, PIGGYBACK PREMIX FROZEN(ML) INTRAVENOUS ONCE
Refills: 0 | Status: COMPLETED | OUTPATIENT
Start: 2025-03-30 | End: 2025-03-30

## 2025-03-30 RX ADMIN — Medication 10 MILLIGRAM(S): at 06:13

## 2025-03-30 RX ADMIN — Medication 4 MILLIGRAM(S): at 05:34

## 2025-03-30 RX ADMIN — Medication 1000 MILLILITER(S): at 05:34

## 2025-03-30 RX ADMIN — METOPROLOL SUCCINATE 5 MILLIGRAM(S): 50 TABLET, EXTENDED RELEASE ORAL at 10:03

## 2025-03-30 RX ADMIN — Medication 40 MILLIGRAM(S): at 10:27

## 2025-03-30 RX ADMIN — Medication 200 GRAM(S): at 08:20

## 2025-03-30 NOTE — CASE MANAGEMENT PROGRESS NOTE - NSCMPROGRESSNOTE_GEN_ALL_CORE
CM received a call from Surgical PA stating wound care orders have been changed. Chart reviewed. Patient was discharged home from [facility] with [facility] on 3/28/25. Patient treated and released from ED today. Updated wound care orders sent to [facility]. CM met with the patient and her daughter in the ED and CM made them aware that new order has been sent to [facility]. Patient stated she was seen by a visiting nurse yesterday. CM instructed patient/daughter to contact visiting nurse later to advise of ED visit. Patient and patient's daughter verbalized understanding. Patient's daughter is transporting the patient home today.

## 2025-03-30 NOTE — CONSULT NOTE ADULT - SUBJECTIVE AND OBJECTIVE BOX
SURGERY PA CONSULT NOTE:    CHIEF COMPLAINT:  Patient is a 80y old  Female who presents with a chief complaint of Nausea    HPI FROM ED:  79y/o Female PMHx HTN, well-controlled asthma, HLD, perforated diverticulitis s/p Campa's procedure (October, [place]), now POD #5 S/P robotic assisted laparoscopic colostomy reversal with ELAN. Pt reports intractable nausea without vomiting/reflux symptoms upon arrival to home after discharge Friday (03/28). Pt states that this would keep her up at night, and she has had very minimal PO intake. Endorses having fluids and ensure protein shakes occasionally. Pt called the office and was prescribed Zofran sublingual tablets which were ineffective prompting her visit today. Pt denies overt abdominal pain. Pt's family notes prior colostomy site dressing is often saturated, pt being seen by VNS for dressing changes. Wound is not packed. Pt endorses multiple semisolid BMs which remain dark in color, green/yellow-tinged as per RN and pt's daughter, who has been taking care of her.  Denies subjective fever/chills, change in urinary, BRBPR, melena, or LOC.     PAST MEDICAL HISTORY:  PAST MEDICAL & SURGICAL HISTORY:  Hypertension      Hyperlipidemia      Asthma, well controlled      Prediabetes      History of diverticulitis of colon      History of colon resection      S/P colonoscopy with polypectomy    REVIEW OF SYSTEMS:  General/Constitutional: No acute distress, no headache, weakness, fevers, or chills   Respiratory: Denies cough/hemoptysis, denies wheezing/SOB/dyspnea  Cardiac: Denies chest pain, palpitations  Abdomen: SEE HPI  Extremities: Denies sores, swelling, discoloration bilat UE/LE  Genitourinary: Denies urinary issues or complaints, denies dysuria/hematuria  Neuro: Denies weakness, paraesthesias, paralysis, syncope, loss of vision  Skin: Denies pruritus, pain, rashes  Psych: Denies hallucinations, visual disturbances, or depression    MEDICATIONS:  Home Medications:  atorvastatin 10 mg oral tablet: 1 tab(s) orally once a day (in the morning) (25 Mar 2025 06:48)  B-12 500 mcg sublingual tablet: 1 tab(s) sublingually once a day (in the morning) (25 Mar 2025 06:48)  Breo Ellipta 100 mcg-25 mcg/inh inhalation powder: 1 inhaled prn (25 Mar 2025 06:48)  Calcium 1000mg/Txu010rr/Zinc w/D325mg daily AM:  (25 Mar 2025 06:48)  cholecalciferol 25 mcg (1000 intl units) oral tablet: 3 tab(s) orally once a day (in the morning) (25 Mar 2025 06:48)  Co Q-10 100 mg oral capsule: 1 cap(s) orally once a day (in the morning) (25 Mar 2025 06:48)  fenofibrate 160 mg oral tablet: 1 tab(s) orally once a day (in the morning) (25 Mar 2025 06:48)  hydroCHLOROthiazide 25 mg oral tablet: 1 tab(s) orally once a day (in the morning) (25 Mar 2025 06:48)  levalbuterol 45 mcg/inh inhalation aerosol: 2 inhaled prn (25 Mar 2025 06:48)  metoprolol tartrate 100 mg oral tablet: 1 tab(s) orally 2 times a day (25 Mar 2025 06:48)  valsartan 160 mg oral tablet: 1 tab(s) orally once a day (in the morning) (25 Mar 2025 06:48)  Vitamin C 500 mg oral tablet: 2 tab(s) orally once a day (in the morning) (25 Mar 2025 06:48)    MEDICATIONS  (STANDING):  pantoprazole    Tablet 40 milliGRAM(s) Oral once    MEDICATIONS  (PRN):      ALLERGIES:  sulfa drugs (Unknown)    SOCIAL HISTORY:  Denies alcohol, tobacco product, or elicit drug use.     FAMILY HISTORY:  FAMILY HISTORY:  FH: HTN (hypertension) (Mother)    VITAL SIGNS:  Vital Signs Last 24 Hrs  T(C): 36.4 (30 Mar 2025 04:51), Max: 36.4 (30 Mar 2025 04:51)  T(F): 97.6 (30 Mar 2025 04:51), Max: 97.6 (30 Mar 2025 04:51)  HR: 103 (30 Mar 2025 04:51) (103 - 103)  BP: 171/80 (30 Mar 2025 04:51) (171/80 - 171/80)  BP(mean): --  RR: 16 (30 Mar 2025 04:51) (16 - 16)  SpO2: 98% (30 Mar 2025 04:51) (98% - 98%)    Parameters below as of 30 Mar 2025 04:51  Patient On (Oxygen Delivery Method): room air    PHYSICAL EXAM:  General: No acute distress, appears comfortable, well nourished, well-groomed, appears stated age  Head, Eyes, Ears, Nose, Throat: Normal cephalic/atraumatic, anicteric, conjunctiva-non injected and moist, vision grossly intact, hearing grossly intact  Abdomen: Soft, nondistended, some watson-incisional TTP; LLQ colostomy site w/ pursestring closure, dressing w/ serous staining. Wound probed w/ q-tip w/ tracking at 3 and 10 o 'clock. Minimal serous drainage expressed, wound irrigated w/ NS and packed w/ 1" iodoform and DSD replaced.  Extremity: No swelling, or open sores, no gross deformities,  good range of motion  Neuro: Alert and oriented x3, motor and sensory intact  Skin: Good color, turgor, texture with no gross lesions, no eruptions, no rashes, no subcutaneous nodules and normal temperature.     LABS:                        7.8    13.74 )-----------( 302      ( 30 Mar 2025 05:25 )             23.6     03-30    135  |  100  |  34[H]  ----------------------------<  119[H]  3.5   |  25  |  0.95    Ca    8.6      30 Mar 2025 05:25    TPro  6.6  /  Alb  2.4[L]  /  TBili  0.6  /  DBili  x   /  AST  17  /  ALT  19  /  AlkPhos  55  03-30    PT/INR - ( 30 Mar 2025 05:25 )   PT: 12.3 sec;   INR: 1.04 ratio         PTT - ( 30 Mar 2025 05:25 )  PTT:27.6 sec  Urinalysis Basic - ( 30 Mar 2025 05:25 )    Color: x / Appearance: x / SG: x / pH: x  Gluc: 119 mg/dL / Ketone: x  / Bili: x / Urobili: x   Blood: x / Protein: x / Nitrite: x   Leuk Esterase: x / RBC: x / WBC x   Sq Epi: x / Non Sq Epi: x / Bacteria: x      LIVER FUNCTIONS - ( 30 Mar 2025 05:25 )  Alb: 2.4 g/dL / Pro: 6.6 g/dL / ALK PHOS: 55 U/L / ALT: 19 U/L / AST: 17 U/L / GGT: x           RADIOLOGY & ADDITIONAL STUDIES:  ACC: [id number] EXAM:  CT ABDOMEN AND PELVIS IC   ORDERED BY: ERIBERTO TILLMAN   PROCEDURE DATE:  03/30/2025    COMPARISON: 11/04/2024    FINDINGS:  LOWER CHEST: Mild linear atelectasis at both lung bases. Trace left   pleural effusion. The heart is mildly enlarged. Calcified atherosclerosis   of the distal descending thoracic aorta and coronary arteries.    LIVER: Steatosis.  BILE DUCTS: Normal caliber.  GALLBLADDER: Within normal limits.  SPLEEN: Within normal limits.  PANCREAS: Mildly atrophic and infiltrated by fat. Stable indeterminate   round left adrenal gland mass measuring up to 2.3 cm.  ADRENALS: Within normal limits.  KIDNEYS/URETERS: Subcentimeter bilateralrenal hypodensities are too   small to adequately characterize. No evidence of radiodense urolithiasis   or obstructive uropathy.    BLADDER: Within normal limits.  REPRODUCTIVE ORGANS: Uterus and adnexa within normal limits.    BOWEL: No bowel obstruction. Appendix is normal. Wall thickening and   mucosal hyperenhancement throughout the first third duodenal segments   with moderate inflammatory stranding throughout the surrounding   perienteric soft tissues; consistent with duodenitis.  PERITONEUM/RETROPERITONEUM: Small volume pelvic ascites. Rectosigmoid   reanastomosis. Some stranding, enhancement, and lining thickening within   the anterior peritoneal reflection; consistent with mild peritonitis.  VESSELS: Atherosclerotic changes.  LYMPH NODES: No lymphadenopathy.  ABDOMINAL WALL: There is a fluid and gas collection with a thick   enhancing rind located in the left anterior pelvic wall, at the site of   the previous ostomy, measuring 8.1 cm x 2.5 cm x 6.5 cm mild and   consistent withan abscess. Subcutaneous emphysema subcutaneous emphysema   within the deep subcutaneous soft tissues of the anterior proximal lower   extremities; most likely related to the recent surgical instrumentation.  BONES: Degenerative changes. Diffuse osseous demineralization. No acute   osseous abnormality detected    IMPRESSION:  1.  Takedown of the left anterior pelvic wall ostomy with rectosigmoid   anastomosis and evidence of mild pelvic peritonitis.  2.  Left anterior pelvic wall abscess, at the site of the previous ostomy.    Read above text for additional findings, and detailed explanations.    --- End of Report ---  SONYA WORTHY MD; Attending Radiologist    ASSESSMENT:    PLAN: SURGERY PA CONSULT NOTE:    CHIEF COMPLAINT:  Patient is a 80y old  Female who presents with a chief complaint of Nausea    HPI FROM ED:  81y/o Female PMHx HTN, well-controlled asthma, HLD, perforated diverticulitis s/p Campa's procedure (October, [place]), now POD #5 S/P robotic assisted laparoscopic colostomy reversal with ELAN. Pt reports intractable nausea without vomiting/reflux symptoms upon arrival to home after discharge Friday (03/28). Pt states that this would keep her up at night, and she has had very minimal PO intake. Endorses having fluids and ensure protein shakes occasionally. Pt called the office and was prescribed Zofran sublingual tablets which were ineffective prompting her visit today. Pt denies overt abdominal pain. Pt's family notes prior colostomy site dressing is often saturated, pt being seen by VNS for dressing changes. Wound is not packed. Pt endorses multiple semisolid BMs which remain dark in color, green/yellow-tinged as per RN and pt's daughter, who has been taking care of her.  Denies subjective fever/chills, change in urinary, BRBPR, melena, or LOC.     PAST MEDICAL HISTORY:  PAST MEDICAL & SURGICAL HISTORY:  Hypertension      Hyperlipidemia      Asthma, well controlled      Prediabetes      History of diverticulitis of colon      History of colon resection      S/P colonoscopy with polypectomy    REVIEW OF SYSTEMS:  General/Constitutional: No acute distress, no headache, weakness, fevers, or chills   Respiratory: Denies cough/hemoptysis, denies wheezing/SOB/dyspnea  Cardiac: Denies chest pain, palpitations  Abdomen: SEE HPI  Extremities: Denies sores, swelling, discoloration bilat UE/LE  Genitourinary: Denies urinary issues or complaints, denies dysuria/hematuria  Neuro: Denies weakness, paraesthesias, paralysis, syncope, loss of vision  Skin: Denies pruritus, pain, rashes  Psych: Denies hallucinations, visual disturbances, or depression    MEDICATIONS:  Home Medications:  atorvastatin 10 mg oral tablet: 1 tab(s) orally once a day (in the morning) (25 Mar 2025 06:48)  B-12 500 mcg sublingual tablet: 1 tab(s) sublingually once a day (in the morning) (25 Mar 2025 06:48)  Breo Ellipta 100 mcg-25 mcg/inh inhalation powder: 1 inhaled prn (25 Mar 2025 06:48)  Calcium 1000mg/Wtt726qc/Zinc w/D325mg daily AM:  (25 Mar 2025 06:48)  cholecalciferol 25 mcg (1000 intl units) oral tablet: 3 tab(s) orally once a day (in the morning) (25 Mar 2025 06:48)  Co Q-10 100 mg oral capsule: 1 cap(s) orally once a day (in the morning) (25 Mar 2025 06:48)  fenofibrate 160 mg oral tablet: 1 tab(s) orally once a day (in the morning) (25 Mar 2025 06:48)  hydroCHLOROthiazide 25 mg oral tablet: 1 tab(s) orally once a day (in the morning) (25 Mar 2025 06:48)  levalbuterol 45 mcg/inh inhalation aerosol: 2 inhaled prn (25 Mar 2025 06:48)  metoprolol tartrate 100 mg oral tablet: 1 tab(s) orally 2 times a day (25 Mar 2025 06:48)  valsartan 160 mg oral tablet: 1 tab(s) orally once a day (in the morning) (25 Mar 2025 06:48)  Vitamin C 500 mg oral tablet: 2 tab(s) orally once a day (in the morning) (25 Mar 2025 06:48)    MEDICATIONS  (STANDING):  pantoprazole    Tablet 40 milliGRAM(s) Oral once    MEDICATIONS  (PRN):      ALLERGIES:  sulfa drugs (Unknown)    SOCIAL HISTORY:  Denies alcohol, tobacco product, or elicit drug use.     FAMILY HISTORY:  FAMILY HISTORY:  FH: HTN (hypertension) (Mother)    VITAL SIGNS:  Vital Signs Last 24 Hrs  T(C): 36.4 (30 Mar 2025 04:51), Max: 36.4 (30 Mar 2025 04:51)  T(F): 97.6 (30 Mar 2025 04:51), Max: 97.6 (30 Mar 2025 04:51)  HR: 103 (30 Mar 2025 04:51) (103 - 103)  BP: 171/80 (30 Mar 2025 04:51) (171/80 - 171/80)  BP(mean): --  RR: 16 (30 Mar 2025 04:51) (16 - 16)  SpO2: 98% (30 Mar 2025 04:51) (98% - 98%)    Parameters below as of 30 Mar 2025 04:51  Patient On (Oxygen Delivery Method): room air    PHYSICAL EXAM:  General: No acute distress, appears comfortable, well nourished, well-groomed, appears stated age  Head, Eyes, Ears, Nose, Throat: Normal cephalic/atraumatic, anicteric, conjunctiva-non injected and moist, vision grossly intact, hearing grossly intact  Abdomen: Soft, nondistended, some watson-incisional TTP; LLQ colostomy site w/ pursestring closure, dressing w/ serous staining. Wound probed w/ q-tip w/ tracking at 3 and 10 o 'clock. Minimal serous drainage expressed, wound irrigated w/ NS and packed w/ 1" iodoform and DSD replaced.  Extremity: No swelling, or open sores, no gross deformities,  good range of motion  Neuro: Alert and oriented x3, motor and sensory intact  Skin: Good color, turgor, texture with no gross lesions, no eruptions, no rashes, no subcutaneous nodules and normal temperature.     LABS:                        7.8    13.74 )-----------( 302      ( 30 Mar 2025 05:25 )             23.6     03-30    135  |  100  |  34[H]  ----------------------------<  119[H]  3.5   |  25  |  0.95    Ca    8.6      30 Mar 2025 05:25    TPro  6.6  /  Alb  2.4[L]  /  TBili  0.6  /  DBili  x   /  AST  17  /  ALT  19  /  AlkPhos  55  03-30    PT/INR - ( 30 Mar 2025 05:25 )   PT: 12.3 sec;   INR: 1.04 ratio         PTT - ( 30 Mar 2025 05:25 )  PTT:27.6 sec  Urinalysis Basic - ( 30 Mar 2025 05:25 )    Color: x / Appearance: x / SG: x / pH: x  Gluc: 119 mg/dL / Ketone: x  / Bili: x / Urobili: x   Blood: x / Protein: x / Nitrite: x   Leuk Esterase: x / RBC: x / WBC x   Sq Epi: x / Non Sq Epi: x / Bacteria: x      LIVER FUNCTIONS - ( 30 Mar 2025 05:25 )  Alb: 2.4 g/dL / Pro: 6.6 g/dL / ALK PHOS: 55 U/L / ALT: 19 U/L / AST: 17 U/L / GGT: x           RADIOLOGY & ADDITIONAL STUDIES:  ACC: [id number] EXAM:  CT ABDOMEN AND PELVIS IC   ORDERED BY: ERIBERTO TILLMAN   PROCEDURE DATE:  03/30/2025    COMPARISON: 11/04/2024    FINDINGS:  LOWER CHEST: Mild linear atelectasis at both lung bases. Trace left   pleural effusion. The heart is mildly enlarged. Calcified atherosclerosis   of the distal descending thoracic aorta and coronary arteries.    LIVER: Steatosis.  BILE DUCTS: Normal caliber.  GALLBLADDER: Within normal limits.  SPLEEN: Within normal limits.  PANCREAS: Mildly atrophic and infiltrated by fat. Stable indeterminate   round left adrenal gland mass measuring up to 2.3 cm.  ADRENALS: Within normal limits.  KIDNEYS/URETERS: Subcentimeter bilateralrenal hypodensities are too   small to adequately characterize. No evidence of radiodense urolithiasis   or obstructive uropathy.    BLADDER: Within normal limits.  REPRODUCTIVE ORGANS: Uterus and adnexa within normal limits.    BOWEL: No bowel obstruction. Appendix is normal. Wall thickening and   mucosal hyperenhancement throughout the first third duodenal segments   with moderate inflammatory stranding throughout the surrounding   perienteric soft tissues; consistent with duodenitis.  PERITONEUM/RETROPERITONEUM: Small volume pelvic ascites. Rectosigmoid   reanastomosis. Some stranding, enhancement, and lining thickening within   the anterior peritoneal reflection; consistent with mild peritonitis.  VESSELS: Atherosclerotic changes.  LYMPH NODES: No lymphadenopathy.  ABDOMINAL WALL: There is a fluid and gas collection with a thick   enhancing rind located in the left anterior pelvic wall, at the site of   the previous ostomy, measuring 8.1 cm x 2.5 cm x 6.5 cm mild and   consistent withan abscess. Subcutaneous emphysema subcutaneous emphysema   within the deep subcutaneous soft tissues of the anterior proximal lower   extremities; most likely related to the recent surgical instrumentation.  BONES: Degenerative changes. Diffuse osseous demineralization. No acute   osseous abnormality detected    IMPRESSION:  1.  Takedown of the left anterior pelvic wall ostomy with rectosigmoid   anastomosis and evidence of mild pelvic peritonitis.  2.  Left anterior pelvic wall abscess, at the site of the previous ostomy.    Read above text for additional findings, and detailed explanations.    --- End of Report ---  SONYA WORTHY MD; Attending Radiologist    ASSESSMENT:  81y/o Female PMHx HTN, well-controlled asthma, HLD, perforated diverticulitis s/p Campa's procedure (October, [place]), now POD #5 S/P robotic assisted laparoscopic colostomy reversal with ELAN. Pt reports intractable nausea without vomiting/reflux symptoms. CTAP pelvic findings consistent w/ post-op changes. Abdominal wall fluid collection likely seroma as pt had a large watson-stomal hernia noted intra-op and is currently draining serous effluent. Now packed w/ 1 " iodoform gauze and covered w/ DSD.  Afebrile. Tachycardia noted, likely due to dehydration 2/2 poor PO intake. Vitals not yet repeat after fluid resuscitation.  Leukocytosis noted.    PLAN:    - CTAP also noted w/ duodenitis w moderate surrounding fat stranding, which may be the cause of her nausea and poor appetite.  - Would obtain GI consult, stool studies, and add pt on PPI in addition to anti-emetics  - PO challenge and potential discharge; pt not requiring colorectal surgery services at this time  - If fails above, may consider medical admission  - Case d/w CM; pt will require wound care for prior ostomy (please pack wound w/ 1" iodoform gauze, wound tracks to 3 and 10 o'clock, and cover with Dry gauze and tegaderm. This ideally should be done every day but every other day is acceptable)  - Case D/w Dr. Wright. SURGERY PA CONSULT NOTE:    CHIEF COMPLAINT:  Patient is a 80y old  Female who presents with a chief complaint of Nausea    HPI FROM ED:  81y/o Female PMHx HTN, well-controlled asthma, HLD, perforated diverticulitis s/p Campa's procedure (October, [place]), now POD #5 S/P robotic assisted laparoscopic colostomy reversal with ELAN. Pt reports intractable nausea without vomiting/reflux symptoms upon arrival to home after discharge Friday (03/28). Pt states that this would keep her up at night, and she has had very minimal PO intake. Endorses having fluids and ensure protein shakes occasionally. Pt called the office and was prescribed Zofran sublingual tablets which were ineffective prompting her visit today. Pt denies overt abdominal pain. Pt's family notes prior colostomy site dressing is often saturated, pt being seen by VNS for dressing changes. Wound is not packed. Pt endorses multiple semisolid BMs which remain dark in color, green/yellow-tinged as per RN and pt's daughter, who has been taking care of her.  Denies subjective fever/chills, change in urinary, BRBPR, melena, or LOC.     PAST MEDICAL HISTORY:  PAST MEDICAL & SURGICAL HISTORY:  Hypertension      Hyperlipidemia      Asthma, well controlled      Prediabetes      History of diverticulitis of colon      History of colon resection      S/P colonoscopy with polypectomy    REVIEW OF SYSTEMS:  General/Constitutional: No acute distress, no headache, weakness, fevers, or chills   Respiratory: Denies cough/hemoptysis, denies wheezing/SOB/dyspnea  Cardiac: Denies chest pain, palpitations  Abdomen: SEE HPI  Extremities: Denies sores, swelling, discoloration bilat UE/LE  Genitourinary: Denies urinary issues or complaints, denies dysuria/hematuria  Neuro: Denies weakness, paraesthesias, paralysis, syncope, loss of vision  Skin: Denies pruritus, pain, rashes  Psych: Denies hallucinations, visual disturbances, or depression    MEDICATIONS:  Home Medications:  atorvastatin 10 mg oral tablet: 1 tab(s) orally once a day (in the morning) (25 Mar 2025 06:48)  B-12 500 mcg sublingual tablet: 1 tab(s) sublingually once a day (in the morning) (25 Mar 2025 06:48)  Breo Ellipta 100 mcg-25 mcg/inh inhalation powder: 1 inhaled prn (25 Mar 2025 06:48)  Calcium 1000mg/Ziw674dl/Zinc w/D325mg daily AM:  (25 Mar 2025 06:48)  cholecalciferol 25 mcg (1000 intl units) oral tablet: 3 tab(s) orally once a day (in the morning) (25 Mar 2025 06:48)  Co Q-10 100 mg oral capsule: 1 cap(s) orally once a day (in the morning) (25 Mar 2025 06:48)  fenofibrate 160 mg oral tablet: 1 tab(s) orally once a day (in the morning) (25 Mar 2025 06:48)  hydroCHLOROthiazide 25 mg oral tablet: 1 tab(s) orally once a day (in the morning) (25 Mar 2025 06:48)  levalbuterol 45 mcg/inh inhalation aerosol: 2 inhaled prn (25 Mar 2025 06:48)  metoprolol tartrate 100 mg oral tablet: 1 tab(s) orally 2 times a day (25 Mar 2025 06:48)  valsartan 160 mg oral tablet: 1 tab(s) orally once a day (in the morning) (25 Mar 2025 06:48)  Vitamin C 500 mg oral tablet: 2 tab(s) orally once a day (in the morning) (25 Mar 2025 06:48)    MEDICATIONS  (STANDING):  pantoprazole    Tablet 40 milliGRAM(s) Oral once    MEDICATIONS  (PRN):      ALLERGIES:  sulfa drugs (Unknown)    SOCIAL HISTORY:  Denies alcohol, tobacco product, or elicit drug use.     FAMILY HISTORY:  FAMILY HISTORY:  FH: HTN (hypertension) (Mother)    VITAL SIGNS:  Vital Signs Last 24 Hrs  T(C): 36.4 (30 Mar 2025 04:51), Max: 36.4 (30 Mar 2025 04:51)  T(F): 97.6 (30 Mar 2025 04:51), Max: 97.6 (30 Mar 2025 04:51)  HR: 103 (30 Mar 2025 04:51) (103 - 103)  BP: 171/80 (30 Mar 2025 04:51) (171/80 - 171/80)  BP(mean): --  RR: 16 (30 Mar 2025 04:51) (16 - 16)  SpO2: 98% (30 Mar 2025 04:51) (98% - 98%)    Parameters below as of 30 Mar 2025 04:51  Patient On (Oxygen Delivery Method): room air    PHYSICAL EXAM:  General: No acute distress, appears comfortable, well nourished, well-groomed, appears stated age  Head, Eyes, Ears, Nose, Throat: Normal cephalic/atraumatic, anicteric, conjunctiva-non injected and moist, vision grossly intact, hearing grossly intact  Abdomen: Soft, nondistended, some watson-incisional TTP; LLQ colostomy site w/ pursestring closure, dressing w/ serous staining. Wound probed w/ q-tip w/ tracking at 3 and 10 o 'clock. Minimal serous drainage expressed, wound irrigated w/ NS and packed w/ 1" iodoform and DSD replaced.  Extremity: No swelling, or open sores, no gross deformities,  good range of motion  Neuro: Alert and oriented x3, motor and sensory intact  Skin: Good color, turgor, texture with no gross lesions, no eruptions, no rashes, no subcutaneous nodules and normal temperature.     LABS:                        7.8    13.74 )-----------( 302      ( 30 Mar 2025 05:25 )             23.6     03-30    135  |  100  |  34[H]  ----------------------------<  119[H]  3.5   |  25  |  0.95    Ca    8.6      30 Mar 2025 05:25    TPro  6.6  /  Alb  2.4[L]  /  TBili  0.6  /  DBili  x   /  AST  17  /  ALT  19  /  AlkPhos  55  03-30    PT/INR - ( 30 Mar 2025 05:25 )   PT: 12.3 sec;   INR: 1.04 ratio         PTT - ( 30 Mar 2025 05:25 )  PTT:27.6 sec  Urinalysis Basic - ( 30 Mar 2025 05:25 )    Color: x / Appearance: x / SG: x / pH: x  Gluc: 119 mg/dL / Ketone: x  / Bili: x / Urobili: x   Blood: x / Protein: x / Nitrite: x   Leuk Esterase: x / RBC: x / WBC x   Sq Epi: x / Non Sq Epi: x / Bacteria: x      LIVER FUNCTIONS - ( 30 Mar 2025 05:25 )  Alb: 2.4 g/dL / Pro: 6.6 g/dL / ALK PHOS: 55 U/L / ALT: 19 U/L / AST: 17 U/L / GGT: x           RADIOLOGY & ADDITIONAL STUDIES:  ACC: [id number] EXAM:  CT ABDOMEN AND PELVIS IC   ORDERED BY: ERIBERTO TILLMAN   PROCEDURE DATE:  03/30/2025    COMPARISON: 11/04/2024    FINDINGS:  LOWER CHEST: Mild linear atelectasis at both lung bases. Trace left   pleural effusion. The heart is mildly enlarged. Calcified atherosclerosis   of the distal descending thoracic aorta and coronary arteries.    LIVER: Steatosis.  BILE DUCTS: Normal caliber.  GALLBLADDER: Within normal limits.  SPLEEN: Within normal limits.  PANCREAS: Mildly atrophic and infiltrated by fat. Stable indeterminate   round left adrenal gland mass measuring up to 2.3 cm.  ADRENALS: Within normal limits.  KIDNEYS/URETERS: Subcentimeter bilateralrenal hypodensities are too   small to adequately characterize. No evidence of radiodense urolithiasis   or obstructive uropathy.    BLADDER: Within normal limits.  REPRODUCTIVE ORGANS: Uterus and adnexa within normal limits.    BOWEL: No bowel obstruction. Appendix is normal. Wall thickening and   mucosal hyperenhancement throughout the first third duodenal segments   with moderate inflammatory stranding throughout the surrounding   perienteric soft tissues; consistent with duodenitis.  PERITONEUM/RETROPERITONEUM: Small volume pelvic ascites. Rectosigmoid   reanastomosis. Some stranding, enhancement, and lining thickening within   the anterior peritoneal reflection; consistent with mild peritonitis.  VESSELS: Atherosclerotic changes.  LYMPH NODES: No lymphadenopathy.  ABDOMINAL WALL: There is a fluid and gas collection with a thick   enhancing rind located in the left anterior pelvic wall, at the site of   the previous ostomy, measuring 8.1 cm x 2.5 cm x 6.5 cm mild and   consistent withan abscess. Subcutaneous emphysema subcutaneous emphysema   within the deep subcutaneous soft tissues of the anterior proximal lower   extremities; most likely related to the recent surgical instrumentation.  BONES: Degenerative changes. Diffuse osseous demineralization. No acute   osseous abnormality detected    IMPRESSION:  1.  Takedown of the left anterior pelvic wall ostomy with rectosigmoid   anastomosis and evidence of mild pelvic peritonitis.  2.  Left anterior pelvic wall abscess, at the site of the previous ostomy.    Read above text for additional findings, and detailed explanations.    --- End of Report ---  SONYA WORTHY MD; Attending Radiologist    ASSESSMENT:  81y/o Female PMHx HTN, well-controlled asthma, HLD, perforated diverticulitis s/p Campa's procedure (October, [place]), now POD #5 S/P robotic assisted laparoscopic colostomy reversal with ELAN. Pt reports intractable nausea without vomiting/reflux symptoms. Pt also noting dark semisolid stools that sound bile-tinged in nature. CTAP pelvic findings consistent w/ post-op changes. Abdominal wall fluid collection likely seroma as pt had a large watson-stomal hernia noted intra-op and is currently draining serous effluent. Now packed w/ 1 " iodoform gauze and covered w/ DSD.  Afebrile. Tachycardia noted, likely due to dehydration 2/2 poor PO intake. Vitals not yet repeat after fluid resuscitation.  Leukocytosis noted.    PLAN:    - CTAP also noted w/ duodenitis w moderate surrounding fat stranding, which may be the cause of her nausea and poor appetite.  - Would obtain GI consult, stool studies, and add on PPI in addition to anti-emetics  - PO challenge and potential discharge; pt not requiring colorectal surgery services at this time  - If fails above, may consider medical admission  - Case d/w CM; pt will require wound care for prior ostomy (please pack wound w/ 1" iodoform gauze, wound tracks to 3 and 10 o'clock, and cover with Dry gauze and Tegaderm This ideally should be done every day but every other day is acceptable)  - Case D/w Dr. Wright.

## 2025-03-30 NOTE — ED PROVIDER NOTE - CLINICAL SUMMARY MEDICAL DECISION MAKING FREE TEXT BOX
80-year-old female history of hyperlipidemia hypertension colon resection secondary to perforated diverticulitis status post ostomy reversal on March 25 with Dr. Flood.  Since the operation patient reports minimal stool output states it has been liquidy and dark.  Denies any fever or chills.  Admits to nausea.  Denies any chest pain shortness of breath.  Denies any vomiting.  Took 2 tabs of Zofran around 1 AM without much relief.    r/o obstruction, anastomotic leak, labs, CT a/p, IV fluids, antiemetics

## 2025-03-30 NOTE — ED ADULT NURSE NOTE - ABDOMEN
LDL (good cholesterol) is low  Vit D is still low  Resume vit D supplement
tender to palpation in B/L LQ/soft/nondistended

## 2025-03-30 NOTE — ED PROVIDER NOTE - PROGRESS NOTE DETAILS
Received patient in signout pending surgical evaluation.  Patient seen by surgery fluid collection was drained this was a seroma not an abscess area was packed.  No acute surgical intervention at this time.  No indication for antibiotics at this time.  Patient also with some duodenitis we will begin Protonix and have patient follow-up with GI.  Tolerated p.o. challenge no diarrhea while in the department. Minoxidil Counseling: Minoxidil is a topical medication which can increase blood flow where it is applied. It is uncertain how this medication increases hair growth. Side effects are uncommon and include stinging and allergic reactions.

## 2025-03-30 NOTE — ED PROVIDER NOTE - OBJECTIVE STATEMENT
80-year-old female history of hyperlipidemia hypertension colon resection secondary to perforated diverticulitis status post ostomy reversal on March 25 with Dr. Flood.  Since the operation patient reports minimal stool output states it has been liquidy and dark.  Denies any fever or chills.  Admits to nausea.  Denies any chest pain shortness of breath.  Denies any vomiting.  Took 2 tabs of Zofran around 1 AM without much relief.

## 2025-03-30 NOTE — ED ADULT NURSE NOTE - OBJECTIVE STATEMENT
Pt received in room 5A, pt is A+Ox4 and independent with ambulation and ADLs at baseline. Pt is presenting with a chief complaint of persistent nausea and dry heaving. Pt states she has a reversal of ostomy last Tuesday and was discharged this past Friday. Pt states she has been nauseous since the reversal but states it was not well managed at home. Pt daughters at bedside state the pt has had no appetite and has been drinking little fluids due to nausea. Pt states she has been dry heaving but denies any emesis. Pt and daughters also told RN about "dark sludgey stools". Pt denies any abdominal pain, denies fever/chills. Pt denies cp/sob/palpitations. Pt pending CT scan

## 2025-03-30 NOTE — ED PROVIDER NOTE - CARE PROVIDER_API CALL
Amrita Lopez  Colon/Rectal Surgery  [address], [place] [address]  Phone: [phone]  Fax: [phone]  Follow Up Time:     Adrian Matthews  Gastroenterology  [address] [place] [id number]  Phone: [phone]  Fax: [phone]  Follow Up Time:

## 2025-03-30 NOTE — ED PROVIDER NOTE - NSFOLLOWUPINSTRUCTIONS_ED_ALL_ED_FT
Return to the emergency room for any new or worsening symptoms  Take your medication as prescribed  Follow-up with your surgeon as instructed this week  Reglan per label instructions as needed for nausea  Protonix 1 tab daily  Follow-up with gastroenterology call tomorrow to schedule follow-up  Advance activity as tolerated      Abdominal Pain    WHAT YOU NEED TO KNOW:    Abdominal pain can be dull, achy, or sharp. You may have pain in one area of your abdomen, or in your entire abdomen. Your pain may be caused by a condition such as constipation, food sensitivity or poisoning, infection, or a blockage. Abdominal pain can also be from a hernia, appendicitis, or an ulcer. Liver, gallbladder, or kidney conditions can also cause abdominal pain. The cause of your abdominal pain may not be known.  Abdominal Organs    DISCHARGE INSTRUCTIONS:    Call your local emergency number (911 in the [place]) if:    You have chest pain or shortness of breath.    Seek care immediately if:    You have pulsing pain in your upper abdomen or lower back that suddenly becomes constant.    Your pain is in the right lower abdominal area and worsens with movement.    You have a fever over 100.4°F (38°C) or shaking chills.    You are vomiting and cannot keep food or liquids down.    Your pain does not improve or gets worse over the next 8 to 12 hours.    You see blood in your vomit or bowel movements, or they look black and tarry.    Your skin or the whites of your eyes turn yellow.    You are a woman and have a large amount of vaginal bleeding that is not your monthly period.  Call your doctor if:    You have pain in your lower back.    You are a man and have pain in your testicles.    You have pain when you urinate.    You have questions or concerns about your condition or care.  Medicines: You may need any of the following:    Medicines may be given to calm your stomach or prevent vomiting.    Prescription pain medicine may be given. Ask your healthcare provider how to take this medicine safely. Some prescription pain medicines contain acetaminophen. Do not take other medicines that contain acetaminophen without talking to your healthcare provider. Too much acetaminophen may cause liver damage. Prescription pain medicine may cause constipation. Ask your healthcare provider how to prevent or treat constipation.    Take your medicine as directed. Contact your healthcare provider if you think your medicine is not helping or if you have side effects. Tell your provider if you are allergic to any medicine. Keep a list of the medicines, vitamins, and herbs you take. Include the amounts, and when and why you take them. Bring the list or the pill bottles to follow-up visits. Carry your medicine list with you in case of an emergency.  Manage or prevent abdominal pain:    Apply heat on your abdomen for 20 to 30 minutes every 2 hours for as many days as directed. Heat helps decrease pain and muscle spasms.    Make changes to the foods you eat, if needed. Do not eat foods that cause abdominal pain or other symptoms. Eat small meals more often. The following changes may also help:  Eat more high-fiber foods if you are constipated. High-fiber foods include fruits, vegetables, whole-grain foods, and legumes such as shafer beans.        Do not eat foods that cause gas if you have bloating. Examples include broccoli, cabbage, beans, and carbonated drinks.    Do not eat foods or drinks that contain sorbitol or fructose if you have diarrhea and bloating. Some examples are fruit juices, candy, jelly, and sugar-free gum.    Do not eat high-fat foods. Examples include fried foods, cheeseburgers, hot dogs, and desserts.    Make changes to the liquids you drink, if needed. Do not drink liquids that cause pain or make it worse, such as orange juice. Drink liquids throughout the day to stay hydrated. The following changes may also help:  Drink more liquids to prevent dehydration from diarrhea or vomiting. Ask your healthcare provider how much liquid to drink each day and which liquids are best for you.    Limit or do not have caffeine. Caffeine may make symptoms such as heartburn or nausea worse.    Limit or do not drink alcohol. Alcohol can make your abdominal pain worse. Ask your healthcare provider if it is okay for you to drink alcohol. Also ask how much is okay for you to drink. A drink of alcohol is 12 ounces of beer, ½ ounce of liquor, or 5 ounces of wine.    Keep a diary of your abdominal pain. A diary may help your healthcare provider learn what is causing your pain. Include when the pain happens, how long it lasts, and what the pain feels like. Write down any other symptoms you have with abdominal pain. Also write down what you eat, and any symptoms you have after you eat.    Manage stress. Stress may cause abdominal pain. Your healthcare provider may recommend relaxation techniques and deep breathing exercises to help decrease your stress. Your healthcare provider may recommend you talk to someone about your stress or anxiety, such as a counselor or a friend. Get plenty of sleep. Exercise regularly.   Family Walking for Exercise      Do not smoke. Nicotine and other chemicals in cigarettes can damage your esophagus and stomach. Ask your healthcare provider for information if you currently smoke and need help to quit. E-cigarettes or smokeless tobacco still contain nicotine. Talk to your healthcare provider before you use these products.  Follow up with your doctor as directed: Write down your questions so you remember to ask them during your visits.

## 2025-03-30 NOTE — ED PROVIDER NOTE - CARE PLAN
1 Principal Discharge DX:	Female acute pelvic peritonitis  Secondary Diagnosis:	Abdominal wall abscess   Principal Discharge DX:	Female acute pelvic peritonitis  Secondary Diagnosis:	Abdominal wall seroma

## 2025-03-30 NOTE — ED ADULT NURSE NOTE - NSFALLRISKINTERV_ED_ALL_ED

## 2025-03-30 NOTE — ED ADULT NURSE NOTE - CHIEF COMPLAINT QUOTE
Brought in to ED accompanied by daughter c/o dark stools and nausea since Friday night. Patient daughter states had reversal of Ostomy Tuesday. Patient states has SOB when she sits up. Denies fever/ chills/ chest pain/ abdominal pain.

## 2025-03-30 NOTE — ED PROVIDER NOTE - PATIENT PORTAL LINK FT
You can access the FollowMyHealth Patient Portal offered by [facility] by registering at the following website: [email or web address]. By joining [employer]’s FollowMyHealth portal, you will also be able to view your health information using other applications (apps) compatible with our system.

## 2025-03-30 NOTE — ED PROVIDER NOTE - CARE PROVIDERS DIRECT ADDRESSES
[email or web address]

## 2025-03-30 NOTE — ED PROVIDER NOTE - PHYSICAL EXAMINATION
Gen: Alert, NAD  Head/eyes: NC/AT, PERRL  ENT: airway patent  Neck: supple  Pulm/lung: Bilateral clear BS  CV/heart: RRR  GI/Abd: soft, +ttp rlq and llq/ND, +BS, no guarding/rebound tenderness  Musculoskeletal: no edema/erythema/cyanosis  Skin: no rash  Neuro: AAOx3, grossly intact

## 2025-03-30 NOTE — ED ADULT TRIAGE NOTE - CHIEF COMPLAINT QUOTE
Brought in to ED accompanied by daughter c/o nauseous started Friday night. Patient daughter states had reversal of Ostomy Tuesday. Patient states has SOB when she sits up. Denies fever/ chisll/ chest pain Brought in to ED accompanied by daughter c/o nauseous started Friday night. Patient daughter states had reversal of Ostomy Tuesday. Patient states has SOB when she sits up. Denies fever/ chills/ chest pain/ abdominal pain. Brought in to ED accompanied by daughter c/o dark stools and nausea since Friday night. Patient daughter states had reversal of Ostomy Tuesday. Patient states has SOB when she sits up. Denies fever/ chills/ chest pain/ abdominal pain.

## 2025-03-30 NOTE — ED PROVIDER NOTE - NS ED MD DISPO DISCHARGE
Current Outpatient Prescriptions:  INDOMETHACIN 50 MG Oral Cap TAKE 1 CAPSULE (50 MG TOTAL) BY MOUTH 3 (THREE) TIMES DAILY WITH MEALS.  Disp: 60 capsule Rfl: 0     Patient requesting refill for meds,Please advs Home

## 2025-04-01 RX ORDER — FUROSEMIDE 40 MG/1
40 TABLET ORAL
Qty: 30 | Refills: 0 | Status: ACTIVE | COMMUNITY
Start: 2025-04-01

## 2025-04-07 ENCOUNTER — RX RENEWAL (OUTPATIENT)
Age: 80
End: 2025-04-07

## 2025-04-07 ENCOUNTER — APPOINTMENT (OUTPATIENT)
Dept: COLORECTAL SURGERY | Facility: CLINIC | Age: 80
End: 2025-04-07
Payer: MEDICARE

## 2025-04-07 VITALS
OXYGEN SATURATION: 98 % | WEIGHT: 203 LBS | DIASTOLIC BLOOD PRESSURE: 65 MMHG | HEIGHT: 64 IN | SYSTOLIC BLOOD PRESSURE: 118 MMHG | RESPIRATION RATE: 14 BRPM | HEART RATE: 87 BPM | TEMPERATURE: 97.5 F | BODY MASS INDEX: 34.66 KG/M2

## 2025-04-07 DIAGNOSIS — Z09 ENCOUNTER FOR FOLLOW-UP EXAMINATION AFTER COMPLETED TREATMENT FOR CONDITIONS OTHER THAN MALIGNANT NEOPLASM: ICD-10-CM

## 2025-04-07 PROCEDURE — 99024 POSTOP FOLLOW-UP VISIT: CPT

## 2025-04-07 PROCEDURE — 17250 CHEM CAUT OF GRANLTJ TISSUE: CPT | Mod: 58

## 2025-04-09 ENCOUNTER — RX RENEWAL (OUTPATIENT)
Age: 80
End: 2025-04-09

## 2025-04-15 ENCOUNTER — LABORATORY RESULT (OUTPATIENT)
Age: 80
End: 2025-04-15

## 2025-05-05 ENCOUNTER — APPOINTMENT (OUTPATIENT)
Dept: COLORECTAL SURGERY | Facility: CLINIC | Age: 80
End: 2025-05-05
Payer: MEDICARE

## 2025-05-05 VITALS
SYSTOLIC BLOOD PRESSURE: 164 MMHG | HEART RATE: 73 BPM | HEIGHT: 64 IN | DIASTOLIC BLOOD PRESSURE: 75 MMHG | BODY MASS INDEX: 32.61 KG/M2 | OXYGEN SATURATION: 100 % | WEIGHT: 191 LBS | TEMPERATURE: 97.3 F | RESPIRATION RATE: 14 BRPM

## 2025-05-05 DIAGNOSIS — Z09 ENCOUNTER FOR FOLLOW-UP EXAMINATION AFTER COMPLETED TREATMENT FOR CONDITIONS OTHER THAN MALIGNANT NEOPLASM: ICD-10-CM

## 2025-05-05 PROCEDURE — 99024 POSTOP FOLLOW-UP VISIT: CPT

## 2025-05-08 NOTE — DATA REVIEWED
[No studies available for review at this time.] : No studies available for review at this time. Cigars

## 2025-05-13 NOTE — PROGRESS NOTE ADULT - PROBLEM/PLAN-1
DISPLAY PLAN FREE TEXT
Patient last seen by Dr. Crooks on 3/19/25:  The patient is instructed to use 1 L supplemental oxygen day and night.  I will get a home oxygen re-evaluation.  Noninvasive positive pressure device.  The data was reviewed.  The patient was instructed to be more compliant using the machine.  The data interpretation as above.  The patient will use the machine more often and then we will re-evaluate, so that we can get more meaningful data.    Walk test completed on 5/8/25:     Please see full report under procedures tab in chart review.     6-minute walk test:     Pulse oximetry on room air at rest: 95%  HR: 85bpm  Pulse oximetry at end of 6 minutes: 90%  HR: 105bpm  Oxygen being used at end of 6 minutes: room air.     Total distance walked: 484ft.     Oxygen required: No     No incidents occurred with testing.        Reviewed above with Dr. Crooks, per verbal order:   - Room air with rest and activity  - 1 L 02 with sleep through noninvasive positive pressure device          Call made to patient, discussed MD recommendations.   Patient noted billing concerns with her oxygen, advised we can contact [employer] for her.   Patient asks if she would be able to discontinue oxygen at night, discussed possible overnight Sp02 while using her NIV on room air. Advised we will discuss this with Dr. Crooks and call her back with his recommendations.   Patient also notes occasionally her home Sp02 drops to 88% with rest. Oxygen quickly recovers to 93% or greater with deep breaths. She admits to shallow breathing and feels this could be the cause, however, would like MD to be aware.   Advised we will review with Dr. Crooks, we will call her back with his recommendations. She verbalized understanding and is agreeable. No further questions at this time.   
Reviewed below with Dr. Crooks, per verbal order:  - Order overnight Sp02 on room air with NIV  - No concerns with 88% sp02 on room air as oxygen quicly improves to 90% or greater with deep breathing     Call made to [facility], spoke with Moe. Insurance taking care of the bill, there is no patient responsibility.       Call made to patient, discussed above information.   She will complete overnight Sp02 while using her NIV, without supplemental oxygen.   She will call [facility] if she does not hear from them to schedule overnight Sp02 within one week.   Also encouraged patient to contact Emily as well to confirm there are not issues with insurance coverage with her oxygen.  Advised to go to the ED with severe shortness of breath or 02 <90% without improvement and shortness of breath.   She verbalized understanding and is agreeable.   No further questions at this time.   
DISPLAY PLAN FREE TEXT

## 2025-06-06 ENCOUNTER — APPOINTMENT (OUTPATIENT)
Dept: CARDIOLOGY | Facility: CLINIC | Age: 80
End: 2025-06-06
Payer: MEDICARE

## 2025-06-06 ENCOUNTER — NON-APPOINTMENT (OUTPATIENT)
Age: 80
End: 2025-06-06

## 2025-06-06 VITALS
OXYGEN SATURATION: 97 % | SYSTOLIC BLOOD PRESSURE: 144 MMHG | HEIGHT: 64 IN | HEART RATE: 91 BPM | DIASTOLIC BLOOD PRESSURE: 84 MMHG | WEIGHT: 199 LBS | BODY MASS INDEX: 33.97 KG/M2

## 2025-06-06 PROCEDURE — 93000 ELECTROCARDIOGRAM COMPLETE: CPT

## 2025-06-06 PROCEDURE — 99214 OFFICE O/P EST MOD 30 MIN: CPT

## 2025-06-12 LAB
BASOPHILS # BLD AUTO: 0.03 K/UL
BASOPHILS NFR BLD AUTO: 0.3 %
EOSINOPHIL # BLD AUTO: 0.24 K/UL
EOSINOPHIL NFR BLD AUTO: 2.6 %
HCT VFR BLD CALC: 32.3 %
HGB BLD-MCNC: 9.8 G/DL
IMM GRANULOCYTES NFR BLD AUTO: 0.5 %
LYMPHOCYTES # BLD AUTO: 3.31 K/UL
LYMPHOCYTES NFR BLD AUTO: 36.1 %
MAN DIFF?: NORMAL
MCHC RBC-ENTMCNC: 28.1 PG
MCHC RBC-ENTMCNC: 30.3 G/DL
MCV RBC AUTO: 92.6 FL
MONOCYTES # BLD AUTO: 0.88 K/UL
MONOCYTES NFR BLD AUTO: 9.6 %
NEUTROPHILS # BLD AUTO: 4.65 K/UL
NEUTROPHILS NFR BLD AUTO: 50.9 %
PLATELET # BLD AUTO: 348 K/UL
RBC # BLD: 3.49 M/UL
RBC # FLD: 14.6 %
WBC # FLD AUTO: 9.16 K/UL

## 2025-06-13 LAB
ALBUMIN SERPL ELPH-MCNC: 4.1 G/DL
ALP BLD-CCNC: 50 U/L
ALT SERPL-CCNC: 17 U/L
ANION GAP SERPL CALC-SCNC: 19 MMOL/L
APPEARANCE: CLEAR
AST SERPL-CCNC: 23 U/L
BACTERIA: NEGATIVE /HPF
BILIRUB SERPL-MCNC: 0.4 MG/DL
BILIRUBIN URINE: NEGATIVE
BLOOD URINE: NEGATIVE
BUN SERPL-MCNC: 33 MG/DL
CALCIUM SERPL-MCNC: 9.5 MG/DL
CAST: 0 /LPF
CHLORIDE SERPL-SCNC: 100 MMOL/L
CHOLEST SERPL-MCNC: 144 MG/DL
CO2 SERPL-SCNC: 22 MMOL/L
COLOR: YELLOW
CREAT SERPL-MCNC: 1.29 MG/DL
EGFRCR SERPLBLD CKD-EPI 2021: 42 ML/MIN/1.73M2
EPITHELIAL CELLS: 6 /HPF
ESTIMATED AVERAGE GLUCOSE: 126 MG/DL
FERRITIN SERPL-MCNC: 54 NG/ML
FOLATE SERPL-MCNC: 14 NG/ML
GLUCOSE QUALITATIVE U: NEGATIVE MG/DL
GLUCOSE SERPL-MCNC: 115 MG/DL
HBA1C MFR BLD HPLC: 6 %
HDLC SERPL-MCNC: 44 MG/DL
IRON SATN MFR SERPL: 15 %
IRON SERPL-MCNC: 60 UG/DL
KETONES URINE: NEGATIVE MG/DL
LDLC SERPL-MCNC: 75 MG/DL
LEUKOCYTE ESTERASE URINE: NEGATIVE
MICROSCOPIC-UA: NORMAL
NITRITE URINE: NEGATIVE
NONHDLC SERPL-MCNC: 100 MG/DL
NT-PROBNP SERPL-MCNC: 971 PG/ML
PH URINE: 6.5
POTASSIUM SERPL-SCNC: 4.6 MMOL/L
PROT SERPL-MCNC: 6.7 G/DL
PROTEIN URINE: NEGATIVE MG/DL
RED BLOOD CELLS URINE: 1 /HPF
SODIUM SERPL-SCNC: 140 MMOL/L
SPECIFIC GRAVITY URINE: 1.02
TIBC SERPL-MCNC: 390 UG/DL
TRIGL SERPL-MCNC: 140 MG/DL
TSH SERPL-ACNC: 1.67 UIU/ML
UIBC SERPL-MCNC: 329 UG/DL
UROBILINOGEN URINE: 0.2 MG/DL
VIT B12 SERPL-MCNC: 526 PG/ML
WHITE BLOOD CELLS URINE: 2 /HPF

## 2025-06-23 ENCOUNTER — RX RENEWAL (OUTPATIENT)
Age: 80
End: 2025-06-23

## 2025-07-01 ENCOUNTER — APPOINTMENT (OUTPATIENT)
Dept: INTERNAL MEDICINE | Facility: CLINIC | Age: 80
End: 2025-07-01

## 2025-07-01 VITALS
WEIGHT: 203 LBS | SYSTOLIC BLOOD PRESSURE: 138 MMHG | TEMPERATURE: 97.1 F | BODY MASS INDEX: 34.66 KG/M2 | DIASTOLIC BLOOD PRESSURE: 82 MMHG | HEART RATE: 85 BPM | OXYGEN SATURATION: 98 % | HEIGHT: 64 IN

## 2025-07-01 PROCEDURE — 99214 OFFICE O/P EST MOD 30 MIN: CPT

## 2025-07-01 PROCEDURE — G2211 COMPLEX E/M VISIT ADD ON: CPT

## 2025-07-01 RX ORDER — TRAZODONE HYDROCHLORIDE 50 MG/1
50 TABLET ORAL
Qty: 30 | Refills: 0 | Status: ACTIVE | COMMUNITY
Start: 2025-07-01 | End: 1900-01-01

## 2025-07-06 PROBLEM — G47.00 INSOMNIA, UNSPECIFIED TYPE: Status: ACTIVE | Noted: 2025-07-01

## 2025-07-07 ENCOUNTER — RX RENEWAL (OUTPATIENT)
Age: 80
End: 2025-07-07

## 2025-07-16 ENCOUNTER — RX RENEWAL (OUTPATIENT)
Age: 80
End: 2025-07-16

## 2025-09-02 ENCOUNTER — RX RENEWAL (OUTPATIENT)
Age: 80
End: 2025-09-02

## 2025-09-18 ENCOUNTER — RX RENEWAL (OUTPATIENT)
Age: 80
End: 2025-09-18

## (undated) DEVICE — PACK MINOR WITH LAP

## (undated) DEVICE — BLADE SCALPEL SAFETYLOCK #15

## (undated) DEVICE — DRAPE 3/4 SHEET W REINFORCEMENT 56X77"

## (undated) DEVICE — Device

## (undated) DEVICE — SUT POLYSORB 3-0 30" V-20 UNDYED

## (undated) DEVICE — SET IV PUMP BLOOD 1VALVE 180FILTER NON-DEHP

## (undated) DEVICE — CATH IV SAFE BC 22G X 1" (BLUE)

## (undated) DEVICE — SUT SOFSILK 3-0 30" V-20

## (undated) DEVICE — SUT PDS II 0 27" OS-6

## (undated) DEVICE — TUBING IV SET GRAVITY 3Y 100" MACRO

## (undated) DEVICE — GLV 7.5 PROTEXIS (WHITE)

## (undated) DEVICE — PACK MAJOR ABDOMINAL WITH LAP

## (undated) DEVICE — PLV/PSP-ESU FORCE2 FIL 16736T: Type: DURABLE MEDICAL EQUIPMENT

## (undated) DEVICE — ELCTR BOVIE TIP BLADE INSULATED 2.75" EDGE

## (undated) DEVICE — XI CANNULA SEAL 5MM - 8 MM

## (undated) DEVICE — PREP BETADINE KIT

## (undated) DEVICE — XI STAPLER SUREFORM 60

## (undated) DEVICE — XI DRAPE COLUMN

## (undated) DEVICE — SENSOR O2 FINGER XL ADULT 24/BX 6BX/CA

## (undated) DEVICE — NDL HYPO REGULAR BEVEL 25G X 1.5" (BLUE)

## (undated) DEVICE — VENODYNE/SCD SLEEVE CALF LARGE

## (undated) DEVICE — FOLEY TRAY 16FR 5CC LF UMETER CLOSED

## (undated) DEVICE — GLV 6 PROTEXIS (BLUE)

## (undated) DEVICE — VALVE BIOPSY

## (undated) DEVICE — SOL IRR BAG NS 0.9% 3000ML

## (undated) DEVICE — TUBING SUCTION CONN 6FT STERILE

## (undated) DEVICE — TUBING CANNULA SALTER LABS NASAL ADULT 7FT

## (undated) DEVICE — XI DRAPE ARM

## (undated) DEVICE — VENODYNE/SCD SLEEVE CALF MEDIUM

## (undated) DEVICE — SOL IRR BAG NS 0.9% 1000ML

## (undated) DEVICE — DRAPE WARMING SOLUTION 66 X 44"

## (undated) DEVICE — GELPORT LAPAROSCOPIC SYSTEM

## (undated) DEVICE — CATH ELCTR GLIDE PRB 7FR

## (undated) DEVICE — STAPLER SKIN PROXIMATE

## (undated) DEVICE — GLV 6.5 PROTEXIS (WHITE)

## (undated) DEVICE — SNARE LRG

## (undated) DEVICE — TUBE RECTAL 24FR

## (undated) DEVICE — BLADE SCALPEL SAFETY #11 WITH PLASTIC GREEN HANDLE

## (undated) DEVICE — LAP PAD W RING 18 X 18"

## (undated) DEVICE — NDL INJ SCLERO INTERJECT 23G

## (undated) DEVICE — LIGASURE BLUNT TIP 44CM

## (undated) DEVICE — TRAP SPECIMEN SPUTUM 40CC

## (undated) DEVICE — XI CORD BIPOLAR CAUTERY (BLUE)

## (undated) DEVICE — DRAPE LAVH 124" X 30" X125"

## (undated) DEVICE — WARMING BLANKET UPPER ADULT

## (undated) DEVICE — FORCEP RADIAL JAW 4 W NDL 2.4MM 2.8MM 240CM ORANGE DISP

## (undated) DEVICE — TROCAR COVIDIEN VERSAPORT BLADELESS OPTICAL 12MM STANDARD

## (undated) DEVICE — SUT PDS II PLUS 1 96" TP-1

## (undated) DEVICE — PLV/PSP-SUCTION IRRIGATOR STRYKER: Type: DURABLE MEDICAL EQUIPMENT

## (undated) DEVICE — SUT PDS II 1 48" TP-1

## (undated) DEVICE — SUT POLYSORB 2-0 30" V-20 UNDYED

## (undated) DEVICE — XI OBTURATOR OPTICAL BLADELESS 8MM

## (undated) DEVICE — ELCTR BOVIE PENCIL SMOKE EVACUATION

## (undated) DEVICE — SYR ASEPTO

## (undated) DEVICE — CATH ELECHMSTAT  INJ 7FR 210CM

## (undated) DEVICE — SYR IV POSIFLUSH NS 3ML 30/TY

## (undated) DEVICE — TUBING STRYKER PNEUMOCLEAR HIGH FLOW

## (undated) DEVICE — DRSG DERMABOND 0.7ML

## (undated) DEVICE — SUT VICRYL PLUS 2-0 27" SH

## (undated) DEVICE — DRSG CURITY GAUZE SPONGE 4 X 4" 12-PLY

## (undated) DEVICE — SYR LUER SLIP TIP 30CC

## (undated) DEVICE — SYR LUER SLIP TIP 50CC

## (undated) DEVICE — SUCTION YANKAUER TAPERED BULBOUS NO VENT

## (undated) DEVICE — XI ARM RETRACTOR GRASPING SMALL

## (undated) DEVICE — TROCAR COVIDIEN VERSAPORT BLADELESS OPTICAL 5MM STANDARD

## (undated) DEVICE — SOL IRR BAG H2O 3000ML

## (undated) DEVICE — FORMALIN CUPS 10% BUFFERED

## (undated) DEVICE — LIGASURE IMPACT

## (undated) DEVICE — INSUFFLATION NDL COVIDIEN SURGINEEDLE VERESS 120MM

## (undated) DEVICE — XI CORD MONOPOLAR CAUTERY (GREEN)

## (undated) DEVICE — STAPLER COVIDIEN ENDO GIA STANDARD HANDLE

## (undated) DEVICE — TUBING IV SET SECONDARY 34"

## (undated) DEVICE — SOL IRR POUR H2O 1000ML

## (undated) DEVICE — TROCAR COVIDIEN VERSAONE FIXATION CANNULA 5MM

## (undated) DEVICE — XI 12MM AND STAPLER CANNULA SEAL

## (undated) DEVICE — SUT MONOCRYL 4-0 27" PS-2 UNDYED

## (undated) DEVICE — TROCAR COVIDIEN BLUNT TIP HASSAN 10MM

## (undated) DEVICE — DISSECTOR ENDOSCOPIC KITTNER SINGLE TIP

## (undated) DEVICE — PLV-SCD MACHINE: Type: DURABLE MEDICAL EQUIPMENT

## (undated) DEVICE — SMOKE EVACUTATION SYS LAPROSCOPIC AC/PA

## (undated) DEVICE — ENDOCUFF VISION SZ 3 SM PRPL

## (undated) DEVICE — TUBE O2 SUPL CRUSH RESIS CONN SOUTHSIDE ONLY

## (undated) DEVICE — PACK IV START WITH CHG

## (undated) DEVICE — STERIS DEFENDO 3-PIECE KIT (AIR/WATER, SUCTION & BIOPSY VALVES)

## (undated) DEVICE — SUT POLYSORB 0 30" GU-45

## (undated) DEVICE — DRAPE SOL WARMING 66X44IN

## (undated) DEVICE — PACKING GAUZE IODOFORM 1"

## (undated) DEVICE — SOL IRR NS 0.9% 250ML

## (undated) DEVICE — SOL IRR POUR NS 0.9% 1000ML

## (undated) DEVICE — FORCEP RADIAL JAW 4 JUMBO 2.8MM 3.2MM 240CM ORANGE DISP

## (undated) DEVICE — FOLEY TRAY 16FR LF URINE METER SURESTEP

## (undated) DEVICE — PACK GENERAL LAPAROSCOPY

## (undated) DEVICE — D HELP - CLEARVIEW CLEARIFY SYSTEM

## (undated) DEVICE — XI ENDOWRIST 12 - 8 MM CANNULA REDUCER

## (undated) DEVICE — SUT POLYSORB 0 30" GS-21 UNDYED

## (undated) DEVICE — XI ENDOWRIST SUCTION IRRIGATOR 8MM

## (undated) DEVICE — CATH IV SAFE BC 20G X 1.16" (PINK)

## (undated) DEVICE — ELCTR GROUNDING PAD ADULT COVIDIEN

## (undated) DEVICE — SUT PROLENE 3-0 36" SH-1

## (undated) DEVICE — GOWN SMARTGOWN RAGLAN XLG

## (undated) DEVICE — SUT POLYSORB 0 30" GU-46

## (undated) DEVICE — MARKER ENDO SPOT EX

## (undated) DEVICE — SUT POLYSORB 2-0 30" GS-21 UNDYED

## (undated) DEVICE — RETRIEVER ROTH NET PLATINUM-UNIVERSAL

## (undated) DEVICE — ENDOCUFF VISION SZ 2 LG GRN

## (undated) DEVICE — BRUSH COLONOSCOPY CYTOLOGY

## (undated) DEVICE — CLAMP BX HOT RAD JAW 3

## (undated) DEVICE — DRAPE TOWEL BLUE 17" X 24"

## (undated) DEVICE — APPLICATOR SURGICEL LAP TROCAR POINT 2.5MM X 150MM

## (undated) DEVICE — SUT PROLENE 3-0 36" SH

## (undated) DEVICE — XI ARM GRASPER TIP UP FENESTRATED

## (undated) DEVICE — DRAPE LAPAROSCOPIC FLUID CONTROL PACK

## (undated) DEVICE — TRAP QUICK CATCH  SINGL CHAMBER

## (undated) DEVICE — XI VESSEL SEALER

## (undated) DEVICE — MASK O2 ADLT POM ELITE W/ MED AND HI CONCEN M TO M 10FT

## (undated) DEVICE — POLY TRAP ETRAP

## (undated) DEVICE — BIOSEL SIGMOIDOSCOPE KIT DISP

## (undated) DEVICE — BLADE SCALPEL SAFETYLOCK #10

## (undated) DEVICE — CANISTER SUCTION 1200CC 10/SL

## (undated) DEVICE — GOWN SLEEVES

## (undated) DEVICE — LIGASURE MARYLAND 5MM X 37CM

## (undated) DEVICE — MASK O2 NON REBREATH 3IN1 ADULT

## (undated) DEVICE — ENDOCATCH 10MM

## (undated) DEVICE — SNARE POLYP SENS 27MM 240CM

## (undated) DEVICE — SUT HEWSON RETRIEVER

## (undated) DEVICE — SOL IRR POUR H2O 500ML

## (undated) DEVICE — SHEARS COVIDIEN ENDO SHEAR 5MM X 31CM W UNIPOLAR CAUTERY